# Patient Record
Sex: FEMALE | Race: WHITE | Employment: OTHER | URBAN - METROPOLITAN AREA
[De-identification: names, ages, dates, MRNs, and addresses within clinical notes are randomized per-mention and may not be internally consistent; named-entity substitution may affect disease eponyms.]

---

## 2017-01-11 ENCOUNTER — TELEPHONE (OUTPATIENT)
Dept: SOCIAL WORK | Age: 72
End: 2017-01-11

## 2017-01-23 RX ORDER — METOPROLOL SUCCINATE 25 MG/1
25 TABLET, EXTENDED RELEASE ORAL DAILY
Qty: 30 TABLET | Refills: 2 | Status: SHIPPED | OUTPATIENT
Start: 2017-01-23 | End: 2017-04-25 | Stop reason: SDUPTHER

## 2017-04-12 RX ORDER — AMLODIPINE BESYLATE 5 MG/1
5 TABLET ORAL DAILY
Qty: 30 TABLET | Refills: 0 | Status: SHIPPED | OUTPATIENT
Start: 2017-04-12 | End: 2017-05-02 | Stop reason: SDUPTHER

## 2017-04-20 RX ORDER — AMOXICILLIN AND CLAVULANATE POTASSIUM 875; 125 MG/1; MG/1
TABLET, FILM COATED ORAL
Qty: 6 TABLET | Refills: 0 | Status: SHIPPED | OUTPATIENT
Start: 2017-04-20 | End: 2017-05-02 | Stop reason: ALTCHOICE

## 2017-04-25 RX ORDER — METOPROLOL SUCCINATE 25 MG/1
25 TABLET, EXTENDED RELEASE ORAL DAILY
Qty: 30 TABLET | Refills: 0 | Status: SHIPPED | OUTPATIENT
Start: 2017-04-25 | End: 2017-05-02 | Stop reason: SDUPTHER

## 2017-05-02 ENCOUNTER — OFFICE VISIT (OUTPATIENT)
Dept: FAMILY MEDICINE CLINIC | Age: 72
End: 2017-05-02

## 2017-05-02 VITALS
HEART RATE: 56 BPM | SYSTOLIC BLOOD PRESSURE: 119 MMHG | BODY MASS INDEX: 35.14 KG/M2 | HEIGHT: 60 IN | WEIGHT: 179 LBS | OXYGEN SATURATION: 97 % | DIASTOLIC BLOOD PRESSURE: 66 MMHG

## 2017-05-02 DIAGNOSIS — R73.03 PREDIABETES: Primary | ICD-10-CM

## 2017-05-02 DIAGNOSIS — I10 ESSENTIAL HYPERTENSION: ICD-10-CM

## 2017-05-02 DIAGNOSIS — R73.01 ELEVATED FASTING GLUCOSE: ICD-10-CM

## 2017-05-02 DIAGNOSIS — F32.A DEPRESSION, UNSPECIFIED DEPRESSION TYPE: ICD-10-CM

## 2017-05-02 LAB
A/G RATIO: 1.8 (ref 1.1–2.2)
ALBUMIN SERPL-MCNC: 4.6 G/DL (ref 3.4–5)
ALP BLD-CCNC: 80 U/L (ref 40–129)
ALT SERPL-CCNC: 14 U/L (ref 10–40)
ANION GAP SERPL CALCULATED.3IONS-SCNC: 16 MMOL/L (ref 3–16)
AST SERPL-CCNC: 18 U/L (ref 15–37)
BILIRUB SERPL-MCNC: 0.8 MG/DL (ref 0–1)
BUN BLDV-MCNC: 19 MG/DL (ref 7–20)
CALCIUM SERPL-MCNC: 10.8 MG/DL (ref 8.3–10.6)
CHLORIDE BLD-SCNC: 99 MMOL/L (ref 99–110)
CHOLESTEROL, TOTAL: 237 MG/DL (ref 0–199)
CO2: 28 MMOL/L (ref 21–32)
CREAT SERPL-MCNC: 0.6 MG/DL (ref 0.6–1.2)
GFR AFRICAN AMERICAN: >60
GFR NON-AFRICAN AMERICAN: >60
GLOBULIN: 2.5 G/DL
GLUCOSE BLD-MCNC: 97 MG/DL (ref 70–99)
HDLC SERPL-MCNC: 69 MG/DL (ref 40–60)
LDL CHOLESTEROL CALCULATED: 147 MG/DL
POTASSIUM SERPL-SCNC: 4.3 MMOL/L (ref 3.5–5.1)
SODIUM BLD-SCNC: 143 MMOL/L (ref 136–145)
TOTAL PROTEIN: 7.1 G/DL (ref 6.4–8.2)
TRIGL SERPL-MCNC: 103 MG/DL (ref 0–150)
VLDLC SERPL CALC-MCNC: 21 MG/DL

## 2017-05-02 PROCEDURE — G8399 PT W/DXA RESULTS DOCUMENT: HCPCS | Performed by: FAMILY MEDICINE

## 2017-05-02 PROCEDURE — 4040F PNEUMOC VAC/ADMIN/RCVD: CPT | Performed by: FAMILY MEDICINE

## 2017-05-02 PROCEDURE — 1090F PRES/ABSN URINE INCON ASSESS: CPT | Performed by: FAMILY MEDICINE

## 2017-05-02 PROCEDURE — 3014F SCREEN MAMMO DOC REV: CPT | Performed by: FAMILY MEDICINE

## 2017-05-02 PROCEDURE — G8419 CALC BMI OUT NRM PARAM NOF/U: HCPCS | Performed by: FAMILY MEDICINE

## 2017-05-02 PROCEDURE — G8427 DOCREV CUR MEDS BY ELIG CLIN: HCPCS | Performed by: FAMILY MEDICINE

## 2017-05-02 PROCEDURE — 1123F ACP DISCUSS/DSCN MKR DOCD: CPT | Performed by: FAMILY MEDICINE

## 2017-05-02 PROCEDURE — 99214 OFFICE O/P EST MOD 30 MIN: CPT | Performed by: FAMILY MEDICINE

## 2017-05-02 PROCEDURE — 3017F COLORECTAL CA SCREEN DOC REV: CPT | Performed by: FAMILY MEDICINE

## 2017-05-02 PROCEDURE — 36415 COLL VENOUS BLD VENIPUNCTURE: CPT | Performed by: FAMILY MEDICINE

## 2017-05-02 PROCEDURE — 1036F TOBACCO NON-USER: CPT | Performed by: FAMILY MEDICINE

## 2017-05-02 RX ORDER — LOSARTAN POTASSIUM AND HYDROCHLOROTHIAZIDE 25; 100 MG/1; MG/1
TABLET ORAL
Qty: 30 TABLET | Refills: 11 | Status: SHIPPED | OUTPATIENT
Start: 2017-05-02 | End: 2018-05-02 | Stop reason: SDUPTHER

## 2017-05-02 RX ORDER — METOPROLOL SUCCINATE 25 MG/1
25 TABLET, EXTENDED RELEASE ORAL DAILY
Qty: 30 TABLET | Refills: 11 | Status: SHIPPED | OUTPATIENT
Start: 2017-05-02 | End: 2018-05-23 | Stop reason: SDUPTHER

## 2017-05-02 RX ORDER — METOPROLOL SUCCINATE 100 MG/1
100 TABLET, EXTENDED RELEASE ORAL DAILY
Qty: 30 TABLET | Refills: 11 | Status: SHIPPED | OUTPATIENT
Start: 2017-05-02 | End: 2018-05-02 | Stop reason: SDUPTHER

## 2017-05-02 RX ORDER — AMLODIPINE BESYLATE 5 MG/1
5 TABLET ORAL DAILY
Qty: 30 TABLET | Refills: 11 | Status: SHIPPED | OUTPATIENT
Start: 2017-05-02 | End: 2018-05-02 | Stop reason: SDUPTHER

## 2017-05-03 LAB
ESTIMATED AVERAGE GLUCOSE: 122.6 MG/DL
HBA1C MFR BLD: 5.9 %

## 2017-05-03 ASSESSMENT — ENCOUNTER SYMPTOMS
ABDOMINAL PAIN: 0
VOMITING: 0
STRIDOR: 0
ANAL BLEEDING: 0
SHORTNESS OF BREATH: 0
EYE PAIN: 0
WHEEZING: 0
BLOOD IN STOOL: 0
DIARRHEA: 0
CHEST TIGHTNESS: 0
CONSTIPATION: 0

## 2017-05-09 DIAGNOSIS — E83.52 SERUM CALCIUM ELEVATED: Primary | ICD-10-CM

## 2017-05-10 ENCOUNTER — HOSPITAL ENCOUNTER (OUTPATIENT)
Dept: OTHER | Age: 72
Discharge: HOME | End: 2017-05-10
Attending: FAMILY MEDICINE | Admitting: FAMILY MEDICINE

## 2017-05-11 ENCOUNTER — HOSPITAL ENCOUNTER (OUTPATIENT)
Dept: MAMMOGRAPHY | Age: 72
Discharge: HOME | End: 2017-05-11
Attending: OBSTETRICS & GYNECOLOGY | Admitting: OBSTETRICS & GYNECOLOGY

## 2017-05-11 DIAGNOSIS — R92.8 BI-RADS CATEGORY 3 MAMMOGRAM RESULT: ICD-10-CM

## 2017-05-12 LAB
CALCIUM IONIZED, CALC AT PH 7.4: 1.42 MMOL/L (ref 1.11–1.3)
CALCIUM IONIZED: 1.41 MMOL/L (ref 1.11–1.3)

## 2017-06-08 ENCOUNTER — TELEPHONE (OUTPATIENT)
Dept: FAMILY MEDICINE CLINIC | Age: 72
End: 2017-06-08

## 2017-06-08 DIAGNOSIS — E83.52 SERUM CALCIUM ELEVATED: Primary | ICD-10-CM

## 2017-06-13 ENCOUNTER — NURSE ONLY (OUTPATIENT)
Dept: FAMILY MEDICINE CLINIC | Age: 72
End: 2017-06-13

## 2017-06-13 DIAGNOSIS — E83.52 SERUM CALCIUM ELEVATED: ICD-10-CM

## 2017-06-13 DIAGNOSIS — E83.52 SERUM CALCIUM ELEVATED: Primary | ICD-10-CM

## 2017-06-13 LAB
ANION GAP SERPL CALCULATED.3IONS-SCNC: 14 MMOL/L (ref 3–16)
BUN BLDV-MCNC: 21 MG/DL (ref 7–20)
CALCIUM SERPL-MCNC: 9.8 MG/DL (ref 8.3–10.6)
CHLORIDE BLD-SCNC: 101 MMOL/L (ref 99–110)
CO2: 28 MMOL/L (ref 21–32)
CREAT SERPL-MCNC: 0.5 MG/DL (ref 0.6–1.2)
GFR AFRICAN AMERICAN: >60
GFR NON-AFRICAN AMERICAN: >60
GLUCOSE BLD-MCNC: 90 MG/DL (ref 70–99)
POTASSIUM SERPL-SCNC: 4.1 MMOL/L (ref 3.5–5.1)
SODIUM BLD-SCNC: 143 MMOL/L (ref 136–145)

## 2017-06-13 PROCEDURE — 36415 COLL VENOUS BLD VENIPUNCTURE: CPT | Performed by: FAMILY MEDICINE

## 2017-10-09 ENCOUNTER — HOSPITAL ENCOUNTER (OUTPATIENT)
Dept: OTHER | Age: 72
Discharge: HOME | End: 2017-10-09
Attending: OBSTETRICS & GYNECOLOGY | Admitting: OBSTETRICS & GYNECOLOGY

## 2017-10-09 LAB
ABO/RH: NORMAL
ALBUMIN SERPL-MCNC: 4.2 G/DL (ref 3.4–5)
ANION GAP SERPL CALCULATED.3IONS-SCNC: 16 MMOL/L (ref 3–16)
ANTIBODY SCREEN: NORMAL
BASOPHILS ABSOLUTE: 0.1 K/UL (ref 0–0.2)
BASOPHILS RELATIVE PERCENT: 0.8 %
BUN BLDV-MCNC: 18 MG/DL (ref 7–20)
CALCIUM SERPL-MCNC: 10.4 MG/DL (ref 8.3–10.6)
CHLORIDE BLD-SCNC: 97 MMOL/L (ref 99–110)
CO2: 27 MMOL/L (ref 21–32)
CREAT SERPL-MCNC: 0.6 MG/DL (ref 0.6–1.2)
EKG ATRIAL RATE: 54 BPM
EKG DIAGNOSIS: NORMAL
EKG P AXIS: 49 DEGREES
EKG P-R INTERVAL: 176 MS
EKG Q-T INTERVAL: 426 MS
EKG QRS DURATION: 76 MS
EKG QTC CALCULATION (BAZETT): 403 MS
EKG R AXIS: -4 DEGREES
EKG T AXIS: 7 DEGREES
EKG VENTRICULAR RATE: 54 BPM
EOSINOPHILS ABSOLUTE: 0.1 K/UL (ref 0–0.6)
EOSINOPHILS RELATIVE PERCENT: 1.6 %
GFR AFRICAN AMERICAN: >60
GFR NON-AFRICAN AMERICAN: >60
GLUCOSE BLD-MCNC: 83 MG/DL (ref 70–99)
HCT VFR BLD CALC: 40.7 % (ref 36–48)
HEMOGLOBIN: 13.6 G/DL (ref 12–16)
LYMPHOCYTES ABSOLUTE: 2.2 K/UL (ref 1–5.1)
LYMPHOCYTES RELATIVE PERCENT: 32.8 %
MCH RBC QN AUTO: 30.3 PG (ref 26–34)
MCHC RBC AUTO-ENTMCNC: 33.4 G/DL (ref 31–36)
MCV RBC AUTO: 90.8 FL (ref 80–100)
MONOCYTES ABSOLUTE: 0.5 K/UL (ref 0–1.3)
MONOCYTES RELATIVE PERCENT: 7.9 %
NEUTROPHILS ABSOLUTE: 3.8 K/UL (ref 1.7–7.7)
NEUTROPHILS RELATIVE PERCENT: 56.9 %
PDW BLD-RTO: 13.4 % (ref 12.4–15.4)
PHOSPHORUS: 3.6 MG/DL (ref 2.5–4.9)
PLATELET # BLD: 237 K/UL (ref 135–450)
PMV BLD AUTO: 8.2 FL (ref 5–10.5)
POTASSIUM SERPL-SCNC: 4.4 MMOL/L (ref 3.5–5.1)
RBC # BLD: 4.49 M/UL (ref 4–5.2)
SODIUM BLD-SCNC: 140 MMOL/L (ref 136–145)
WBC # BLD: 6.7 K/UL (ref 4–11)

## 2017-10-16 ENCOUNTER — HOSPITAL ENCOUNTER (OUTPATIENT)
Dept: OTHER | Age: 72
Discharge: HOME | End: 2017-10-16
Attending: OBSTETRICS & GYNECOLOGY | Admitting: OBSTETRICS & GYNECOLOGY

## 2017-10-16 ENCOUNTER — HOSPITAL ENCOUNTER (OUTPATIENT)
Dept: CT IMAGING | Age: 72
Discharge: HOME | End: 2017-10-16
Attending: OBSTETRICS & GYNECOLOGY | Admitting: OBSTETRICS & GYNECOLOGY

## 2017-10-16 DIAGNOSIS — R52 PAIN: ICD-10-CM

## 2017-10-16 DIAGNOSIS — C54.1 MALIGNANT NEOPLASM OF ENDOMETRIUM (HCC): ICD-10-CM

## 2017-10-16 LAB
CREAT SERPL-MCNC: 0.6 MG/DL (ref 0.6–1.2)
GFR AFRICAN AMERICAN: >60
GFR NON-AFRICAN AMERICAN: >60

## 2017-11-10 ENCOUNTER — TELEPHONE (OUTPATIENT)
Dept: FAMILY MEDICINE CLINIC | Age: 72
End: 2017-11-10

## 2017-11-10 NOTE — TELEPHONE ENCOUNTER
Received call from spouse. He states patient had surgery 11/8/17. She was informed she can resume  taking her medications on Friday. Her BP is 115/58. He is requesting advise on whether she should start taking her  blood pressure  medication.  Advise

## 2018-03-19 ENCOUNTER — OFFICE VISIT (OUTPATIENT)
Dept: ORTHOPEDIC SURGERY | Age: 73
End: 2018-03-19

## 2018-03-19 VITALS — BODY MASS INDEX: 35.15 KG/M2 | HEIGHT: 60 IN | WEIGHT: 179.01 LBS

## 2018-03-19 DIAGNOSIS — Z96.651 STATUS POST TOTAL RIGHT KNEE REPLACEMENT: Primary | ICD-10-CM

## 2018-03-19 DIAGNOSIS — M25.561 PAIN IN JOINT OF RIGHT KNEE: ICD-10-CM

## 2018-03-19 PROCEDURE — 1123F ACP DISCUSS/DSCN MKR DOCD: CPT | Performed by: ORTHOPAEDIC SURGERY

## 2018-03-19 PROCEDURE — 3014F SCREEN MAMMO DOC REV: CPT | Performed by: ORTHOPAEDIC SURGERY

## 2018-03-19 PROCEDURE — 3017F COLORECTAL CA SCREEN DOC REV: CPT | Performed by: ORTHOPAEDIC SURGERY

## 2018-03-19 PROCEDURE — G8417 CALC BMI ABV UP PARAM F/U: HCPCS | Performed by: ORTHOPAEDIC SURGERY

## 2018-03-19 PROCEDURE — G8484 FLU IMMUNIZE NO ADMIN: HCPCS | Performed by: ORTHOPAEDIC SURGERY

## 2018-03-19 PROCEDURE — 4040F PNEUMOC VAC/ADMIN/RCVD: CPT | Performed by: ORTHOPAEDIC SURGERY

## 2018-03-19 PROCEDURE — G8399 PT W/DXA RESULTS DOCUMENT: HCPCS | Performed by: ORTHOPAEDIC SURGERY

## 2018-03-19 PROCEDURE — G8427 DOCREV CUR MEDS BY ELIG CLIN: HCPCS | Performed by: ORTHOPAEDIC SURGERY

## 2018-03-19 PROCEDURE — 1090F PRES/ABSN URINE INCON ASSESS: CPT | Performed by: ORTHOPAEDIC SURGERY

## 2018-03-19 PROCEDURE — 1036F TOBACCO NON-USER: CPT | Performed by: ORTHOPAEDIC SURGERY

## 2018-03-19 PROCEDURE — 99213 OFFICE O/P EST LOW 20 MIN: CPT | Performed by: ORTHOPAEDIC SURGERY

## 2018-03-19 ASSESSMENT — KOOS JR
GOING UP OR DOWN STAIRS: 2
STRAIGHTENING KNEE FULLY: 0
RISING FROM SITTING: 1
BENDING TO THE FLOOR TO PICK UP OBJECT: 1
HOW SEVERE IS YOUR KNEE STIFFNESS AFTER FIRST WAKING IN MORNING: 2
STANDING UPRIGHT: 0
TWISING OR PIVOTING ON KNEE: 1

## 2018-04-03 ENCOUNTER — OFFICE VISIT (OUTPATIENT)
Dept: FAMILY MEDICINE CLINIC | Age: 73
End: 2018-04-03

## 2018-04-03 VITALS
OXYGEN SATURATION: 96 % | BODY MASS INDEX: 39.45 KG/M2 | SYSTOLIC BLOOD PRESSURE: 142 MMHG | WEIGHT: 202 LBS | HEART RATE: 69 BPM | DIASTOLIC BLOOD PRESSURE: 86 MMHG

## 2018-04-03 DIAGNOSIS — I10 ESSENTIAL HYPERTENSION: Primary | ICD-10-CM

## 2018-04-03 DIAGNOSIS — R73.03 PREDIABETES: ICD-10-CM

## 2018-04-03 DIAGNOSIS — E78.5 DYSLIPIDEMIA: ICD-10-CM

## 2018-04-03 PROCEDURE — 99214 OFFICE O/P EST MOD 30 MIN: CPT | Performed by: FAMILY MEDICINE

## 2018-04-03 PROCEDURE — 3014F SCREEN MAMMO DOC REV: CPT | Performed by: FAMILY MEDICINE

## 2018-04-03 PROCEDURE — 1036F TOBACCO NON-USER: CPT | Performed by: FAMILY MEDICINE

## 2018-04-03 PROCEDURE — 4040F PNEUMOC VAC/ADMIN/RCVD: CPT | Performed by: FAMILY MEDICINE

## 2018-04-03 PROCEDURE — 1090F PRES/ABSN URINE INCON ASSESS: CPT | Performed by: FAMILY MEDICINE

## 2018-04-03 PROCEDURE — 3288F FALL RISK ASSESSMENT DOCD: CPT | Performed by: FAMILY MEDICINE

## 2018-04-03 PROCEDURE — 1123F ACP DISCUSS/DSCN MKR DOCD: CPT | Performed by: FAMILY MEDICINE

## 2018-04-03 PROCEDURE — G8427 DOCREV CUR MEDS BY ELIG CLIN: HCPCS | Performed by: FAMILY MEDICINE

## 2018-04-03 PROCEDURE — G8510 SCR DEP NEG, NO PLAN REQD: HCPCS | Performed by: FAMILY MEDICINE

## 2018-04-03 PROCEDURE — G8417 CALC BMI ABV UP PARAM F/U: HCPCS | Performed by: FAMILY MEDICINE

## 2018-04-03 PROCEDURE — G8399 PT W/DXA RESULTS DOCUMENT: HCPCS | Performed by: FAMILY MEDICINE

## 2018-04-03 PROCEDURE — 3017F COLORECTAL CA SCREEN DOC REV: CPT | Performed by: FAMILY MEDICINE

## 2018-04-03 ASSESSMENT — PATIENT HEALTH QUESTIONNAIRE - PHQ9
1. LITTLE INTEREST OR PLEASURE IN DOING THINGS: 0
SUM OF ALL RESPONSES TO PHQ QUESTIONS 1-9: 0
SUM OF ALL RESPONSES TO PHQ9 QUESTIONS 1 & 2: 0
2. FEELING DOWN, DEPRESSED OR HOPELESS: 0

## 2018-04-03 ASSESSMENT — ENCOUNTER SYMPTOMS: SHORTNESS OF BREATH: 0

## 2018-04-04 ENCOUNTER — NURSE ONLY (OUTPATIENT)
Dept: FAMILY MEDICINE CLINIC | Age: 73
End: 2018-04-04

## 2018-04-04 DIAGNOSIS — I10 ESSENTIAL HYPERTENSION: ICD-10-CM

## 2018-04-04 DIAGNOSIS — E78.5 DYSLIPIDEMIA: ICD-10-CM

## 2018-04-04 DIAGNOSIS — R73.03 PREDIABETES: ICD-10-CM

## 2018-04-04 DIAGNOSIS — E78.5 DYSLIPIDEMIA: Primary | ICD-10-CM

## 2018-04-04 LAB
A/G RATIO: 1.7 (ref 1.1–2.2)
ALBUMIN SERPL-MCNC: 4.5 G/DL (ref 3.4–5)
ALP BLD-CCNC: 81 U/L (ref 40–129)
ALT SERPL-CCNC: 11 U/L (ref 10–40)
ANION GAP SERPL CALCULATED.3IONS-SCNC: 15 MMOL/L (ref 3–16)
AST SERPL-CCNC: 18 U/L (ref 15–37)
BILIRUB SERPL-MCNC: 0.6 MG/DL (ref 0–1)
BUN BLDV-MCNC: 23 MG/DL (ref 7–20)
CALCIUM SERPL-MCNC: 9.7 MG/DL (ref 8.3–10.6)
CHLORIDE BLD-SCNC: 96 MMOL/L (ref 99–110)
CHOLESTEROL, TOTAL: 224 MG/DL (ref 0–199)
CO2: 30 MMOL/L (ref 21–32)
CREAT SERPL-MCNC: 0.6 MG/DL (ref 0.6–1.2)
GFR AFRICAN AMERICAN: >60
GFR NON-AFRICAN AMERICAN: >60
GLOBULIN: 2.7 G/DL
GLUCOSE BLD-MCNC: 102 MG/DL (ref 70–99)
HDLC SERPL-MCNC: 77 MG/DL (ref 40–60)
LDL CHOLESTEROL CALCULATED: 134 MG/DL
POTASSIUM SERPL-SCNC: 4.1 MMOL/L (ref 3.5–5.1)
SODIUM BLD-SCNC: 141 MMOL/L (ref 136–145)
TOTAL PROTEIN: 7.2 G/DL (ref 6.4–8.2)
TRIGL SERPL-MCNC: 63 MG/DL (ref 0–150)
VLDLC SERPL CALC-MCNC: 13 MG/DL

## 2018-04-04 PROCEDURE — 36415 COLL VENOUS BLD VENIPUNCTURE: CPT | Performed by: FAMILY MEDICINE

## 2018-04-05 LAB
ESTIMATED AVERAGE GLUCOSE: 122.6 MG/DL
HBA1C MFR BLD: 5.9 %

## 2018-04-06 RX ORDER — ATORVASTATIN CALCIUM 20 MG/1
20 TABLET, FILM COATED ORAL DAILY
Qty: 30 TABLET | Refills: 1 | Status: SHIPPED | OUTPATIENT
Start: 2018-04-06 | End: 2018-05-02 | Stop reason: SDUPTHER

## 2018-05-02 RX ORDER — METOPROLOL SUCCINATE 100 MG/1
100 TABLET, EXTENDED RELEASE ORAL DAILY
Qty: 90 TABLET | Refills: 1 | Status: SHIPPED | OUTPATIENT
Start: 2018-05-02 | End: 2018-11-01 | Stop reason: SDUPTHER

## 2018-05-02 RX ORDER — LATANOPROST 50 UG/ML
1 SOLUTION/ DROPS OPHTHALMIC NIGHTLY
Qty: 3 BOTTLE | Refills: 0 | Status: SHIPPED | OUTPATIENT
Start: 2018-05-02 | End: 2021-11-12 | Stop reason: ALTCHOICE

## 2018-05-02 RX ORDER — SERTRALINE HYDROCHLORIDE 100 MG/1
150 TABLET, FILM COATED ORAL DAILY
Qty: 90 TABLET | Refills: 0 | Status: SHIPPED | OUTPATIENT
Start: 2018-05-02 | End: 2020-05-05 | Stop reason: SDUPTHER

## 2018-05-02 RX ORDER — LOSARTAN POTASSIUM AND HYDROCHLOROTHIAZIDE 25; 100 MG/1; MG/1
TABLET ORAL
Qty: 90 TABLET | Refills: 1 | Status: SHIPPED | OUTPATIENT
Start: 2018-05-02 | End: 2018-11-21 | Stop reason: SDUPTHER

## 2018-05-02 RX ORDER — AMLODIPINE BESYLATE 5 MG/1
5 TABLET ORAL DAILY
Qty: 90 TABLET | Refills: 1 | Status: SHIPPED | OUTPATIENT
Start: 2018-05-02 | End: 2018-11-21 | Stop reason: SDUPTHER

## 2018-05-02 RX ORDER — TIMOLOL MALEATE 5 MG/ML
1 SOLUTION/ DROPS OPHTHALMIC 2 TIMES DAILY
Qty: 3 BOTTLE | Refills: 0 | Status: SHIPPED | OUTPATIENT
Start: 2018-05-02 | End: 2021-03-11 | Stop reason: ALTCHOICE

## 2018-05-02 RX ORDER — ATORVASTATIN CALCIUM 20 MG/1
20 TABLET, FILM COATED ORAL DAILY
Qty: 90 TABLET | Refills: 1 | Status: SHIPPED | OUTPATIENT
Start: 2018-05-02 | End: 2018-06-13

## 2018-05-23 ENCOUNTER — HOSPITAL ENCOUNTER (OUTPATIENT)
Dept: MAMMOGRAPHY | Age: 73
Discharge: HOME | End: 2018-05-23
Admitting: OBSTETRICS & GYNECOLOGY

## 2018-05-23 ENCOUNTER — OFFICE VISIT (OUTPATIENT)
Dept: FAMILY MEDICINE CLINIC | Age: 73
End: 2018-05-23

## 2018-05-23 VITALS
TEMPERATURE: 97.6 F | WEIGHT: 230 LBS | BODY MASS INDEX: 44.92 KG/M2 | SYSTOLIC BLOOD PRESSURE: 140 MMHG | DIASTOLIC BLOOD PRESSURE: 82 MMHG

## 2018-05-23 DIAGNOSIS — R60.9 EDEMA, UNSPECIFIED TYPE: Primary | ICD-10-CM

## 2018-05-23 DIAGNOSIS — I10 ESSENTIAL HYPERTENSION: ICD-10-CM

## 2018-05-23 DIAGNOSIS — Z12.31 ENCOUNTER FOR SCREENING MAMMOGRAM FOR BREAST CANCER: ICD-10-CM

## 2018-05-23 PROCEDURE — G8428 CUR MEDS NOT DOCUMENT: HCPCS | Performed by: NURSE PRACTITIONER

## 2018-05-23 PROCEDURE — 1090F PRES/ABSN URINE INCON ASSESS: CPT | Performed by: NURSE PRACTITIONER

## 2018-05-23 PROCEDURE — 1036F TOBACCO NON-USER: CPT | Performed by: NURSE PRACTITIONER

## 2018-05-23 PROCEDURE — 3017F COLORECTAL CA SCREEN DOC REV: CPT | Performed by: NURSE PRACTITIONER

## 2018-05-23 PROCEDURE — 4040F PNEUMOC VAC/ADMIN/RCVD: CPT | Performed by: NURSE PRACTITIONER

## 2018-05-23 PROCEDURE — 1123F ACP DISCUSS/DSCN MKR DOCD: CPT | Performed by: NURSE PRACTITIONER

## 2018-05-23 PROCEDURE — 99214 OFFICE O/P EST MOD 30 MIN: CPT | Performed by: NURSE PRACTITIONER

## 2018-05-23 PROCEDURE — G8399 PT W/DXA RESULTS DOCUMENT: HCPCS | Performed by: NURSE PRACTITIONER

## 2018-05-23 PROCEDURE — G8417 CALC BMI ABV UP PARAM F/U: HCPCS | Performed by: NURSE PRACTITIONER

## 2018-05-23 RX ORDER — HYDROCHLOROTHIAZIDE 25 MG/1
25 TABLET ORAL DAILY
Qty: 30 TABLET | Refills: 0 | Status: SHIPPED | OUTPATIENT
Start: 2018-05-23 | End: 2018-08-15

## 2018-05-23 RX ORDER — METOPROLOL SUCCINATE 25 MG/1
25 TABLET, EXTENDED RELEASE ORAL DAILY
Qty: 30 TABLET | Refills: 1 | Status: SHIPPED | OUTPATIENT
Start: 2018-05-23 | End: 2018-08-02 | Stop reason: SDUPTHER

## 2018-07-12 ENCOUNTER — OFFICE VISIT (OUTPATIENT)
Dept: ORTHOPEDIC SURGERY | Age: 73
End: 2018-07-12

## 2018-07-12 ENCOUNTER — HOSPITAL ENCOUNTER (OUTPATIENT)
Dept: OTHER | Age: 73
Discharge: HOME | End: 2018-07-12
Attending: ORTHOPAEDIC SURGERY | Admitting: ORTHOPAEDIC SURGERY

## 2018-07-12 VITALS — HEIGHT: 60 IN | BODY MASS INDEX: 35.34 KG/M2 | WEIGHT: 180 LBS

## 2018-07-12 DIAGNOSIS — M25.561 ACUTE PAIN OF BOTH KNEES: ICD-10-CM

## 2018-07-12 DIAGNOSIS — M25.561 ACUTE PAIN OF BOTH KNEES: Primary | ICD-10-CM

## 2018-07-12 DIAGNOSIS — Z96.653 HISTORY OF TOTAL KNEE ARTHROPLASTY, BILATERAL: ICD-10-CM

## 2018-07-12 DIAGNOSIS — M25.562 ACUTE PAIN OF BOTH KNEES: ICD-10-CM

## 2018-07-12 DIAGNOSIS — M25.562 ACUTE PAIN OF BOTH KNEES: Primary | ICD-10-CM

## 2018-07-12 LAB
BASOPHILS ABSOLUTE: 0.1 K/UL (ref 0–0.2)
BASOPHILS RELATIVE PERCENT: 1 %
C-REACTIVE PROTEIN: 2.8 MG/L (ref 0–5.1)
EOSINOPHILS ABSOLUTE: 0.1 K/UL (ref 0–0.6)
EOSINOPHILS RELATIVE PERCENT: 1.4 %
HCT VFR BLD CALC: 38 % (ref 36–48)
HEMOGLOBIN: 12.7 G/DL (ref 12–16)
LYMPHOCYTES ABSOLUTE: 1.5 K/UL (ref 1–5.1)
LYMPHOCYTES RELATIVE PERCENT: 26.3 %
MCH RBC QN AUTO: 30.1 PG (ref 26–34)
MCHC RBC AUTO-ENTMCNC: 33.5 G/DL (ref 31–36)
MCV RBC AUTO: 89.6 FL (ref 80–100)
MONOCYTES ABSOLUTE: 0.4 K/UL (ref 0–1.3)
MONOCYTES RELATIVE PERCENT: 7 %
NEUTROPHILS ABSOLUTE: 3.7 K/UL (ref 1.7–7.7)
NEUTROPHILS RELATIVE PERCENT: 64.3 %
PDW BLD-RTO: 12.5 % (ref 12.4–15.4)
PLATELET # BLD: 309 K/UL (ref 135–450)
PMV BLD AUTO: 7.7 FL (ref 5–10.5)
RBC # BLD: 4.24 M/UL (ref 4–5.2)
SEDIMENTATION RATE, ERYTHROCYTE: 23 MM/HR (ref 0–30)
WBC # BLD: 5.8 K/UL (ref 4–11)

## 2018-07-12 PROCEDURE — 1101F PT FALLS ASSESS-DOCD LE1/YR: CPT | Performed by: ORTHOPAEDIC SURGERY

## 2018-07-12 PROCEDURE — G8427 DOCREV CUR MEDS BY ELIG CLIN: HCPCS | Performed by: ORTHOPAEDIC SURGERY

## 2018-07-12 PROCEDURE — 4040F PNEUMOC VAC/ADMIN/RCVD: CPT | Performed by: ORTHOPAEDIC SURGERY

## 2018-07-12 PROCEDURE — 3017F COLORECTAL CA SCREEN DOC REV: CPT | Performed by: ORTHOPAEDIC SURGERY

## 2018-07-12 PROCEDURE — G8417 CALC BMI ABV UP PARAM F/U: HCPCS | Performed by: ORTHOPAEDIC SURGERY

## 2018-07-12 PROCEDURE — 1090F PRES/ABSN URINE INCON ASSESS: CPT | Performed by: ORTHOPAEDIC SURGERY

## 2018-07-12 PROCEDURE — G8399 PT W/DXA RESULTS DOCUMENT: HCPCS | Performed by: ORTHOPAEDIC SURGERY

## 2018-07-12 PROCEDURE — 99213 OFFICE O/P EST LOW 20 MIN: CPT | Performed by: ORTHOPAEDIC SURGERY

## 2018-07-12 PROCEDURE — 1036F TOBACCO NON-USER: CPT | Performed by: ORTHOPAEDIC SURGERY

## 2018-07-12 PROCEDURE — 1123F ACP DISCUSS/DSCN MKR DOCD: CPT | Performed by: ORTHOPAEDIC SURGERY

## 2018-07-12 NOTE — PROGRESS NOTES
Chief Complaint    Knee Pain (RIGHT TKR 9/14/16; FERNANDO ankle/leg swelling increased after trip to [facility], saw PCP (calvin FRANKLIN), given blood thinner and water pill; has been modifying activity--helps swelling)      History of Present Illness:  Da Reich is a 75 y.o. female she is here for evaluation of both of her knees. She had a left knee replacement done 7 years ago. She had a right knee replacement done one half years ago. She had been doing well up until April 21 of 2018. She took a plane flight to [place] and ever since then she has had swelling of both of her legs right worse than left. She was evaluated with Doppler ultrasound. She was negative for DVT. She has had continued swelling of both of her legs right worse than left. She believes it is worsened when she is on her feet for long periods. She is here today for evaluation of this. She denies any fevers or chills. She denies any sick contacts. She does believe she may have walked through a lawn back in April that had pesticides. She is unsure if this is related. Pain Assessment  Location of Pain: Ankle  Location Modifiers: Right, Left  Severity of Pain: 2  Quality of Pain: Throbbing, Dull  Duration of Pain: Persistent  Frequency of Pain: Intermittent  Aggravating Factors: Standing, Walking  Limiting Behavior: Some  Relieving Factors: Rest    Medical History:  Patient's medications, allergies, past medical, surgical, social and family histories were reviewed and updated as appropriate.     Review of Systems:  Pertinent items are noted in HPI  Review of systems reviewed from Patient History Form       Vital Signs:  Ht 5' (1.524 m)   Wt 180 lb (81.6 kg)   LMP  (LMP Unknown)   BMI 35.15 kg/m²     General Exam:   Constitutional: Patient is adequately groomed with no evidence of malnutrition    Left Knee Examination:    Inspection:  Incision well-healed no erythema and no drainage, moderate swelling right knee, no effusion    Palpation:  No will proceed with intra-articular joint aspiration. This is explained in detail to the patient. All questions were answered to her satisfaction. She understands and agrees to plan.

## 2018-07-16 ENCOUNTER — OFFICE VISIT (OUTPATIENT)
Dept: ORTHOPEDIC SURGERY | Age: 73
End: 2018-07-16

## 2018-07-16 DIAGNOSIS — Z96.653 HISTORY OF TOTAL KNEE ARTHROPLASTY, BILATERAL: Primary | ICD-10-CM

## 2018-07-16 PROCEDURE — 1036F TOBACCO NON-USER: CPT | Performed by: ORTHOPAEDIC SURGERY

## 2018-07-16 PROCEDURE — G8417 CALC BMI ABV UP PARAM F/U: HCPCS | Performed by: ORTHOPAEDIC SURGERY

## 2018-07-16 PROCEDURE — G8427 DOCREV CUR MEDS BY ELIG CLIN: HCPCS | Performed by: ORTHOPAEDIC SURGERY

## 2018-07-16 PROCEDURE — G8399 PT W/DXA RESULTS DOCUMENT: HCPCS | Performed by: ORTHOPAEDIC SURGERY

## 2018-07-16 PROCEDURE — 99213 OFFICE O/P EST LOW 20 MIN: CPT | Performed by: ORTHOPAEDIC SURGERY

## 2018-07-16 PROCEDURE — 1090F PRES/ABSN URINE INCON ASSESS: CPT | Performed by: ORTHOPAEDIC SURGERY

## 2018-07-16 PROCEDURE — 3017F COLORECTAL CA SCREEN DOC REV: CPT | Performed by: ORTHOPAEDIC SURGERY

## 2018-07-16 PROCEDURE — 4040F PNEUMOC VAC/ADMIN/RCVD: CPT | Performed by: ORTHOPAEDIC SURGERY

## 2018-07-16 PROCEDURE — 1123F ACP DISCUSS/DSCN MKR DOCD: CPT | Performed by: ORTHOPAEDIC SURGERY

## 2018-07-16 PROCEDURE — 1101F PT FALLS ASSESS-DOCD LE1/YR: CPT | Performed by: ORTHOPAEDIC SURGERY

## 2018-07-20 DIAGNOSIS — I10 ESSENTIAL HYPERTENSION: ICD-10-CM

## 2018-07-20 NOTE — TELEPHONE ENCOUNTER
Patient called and is requesting a refill on her prescription of Metoprolol 25 XL. Patient states that she is leaving to go out of town on Sunday and needs to  the prescription from [address] [place] [address] at the [facility] before she leaves.

## 2018-07-23 RX ORDER — METOPROLOL SUCCINATE 25 MG/1
25 TABLET, EXTENDED RELEASE ORAL DAILY
Qty: 30 TABLET | Refills: 2 | OUTPATIENT
Start: 2018-07-23

## 2018-08-02 DIAGNOSIS — I10 ESSENTIAL HYPERTENSION: ICD-10-CM

## 2018-08-02 RX ORDER — METOPROLOL SUCCINATE 25 MG/1
25 TABLET, EXTENDED RELEASE ORAL DAILY
Qty: 30 TABLET | Refills: 2 | Status: SHIPPED | OUTPATIENT
Start: 2018-08-02 | End: 2018-11-10 | Stop reason: SDUPTHER

## 2018-08-20 ENCOUNTER — HOSPITAL ENCOUNTER (OUTPATIENT)
Dept: PREADMISSION TESTING | Age: 73
Discharge: HOME | End: 2018-08-24
Payer: MEDICARE

## 2018-08-20 LAB
ALBUMIN SERPL-MCNC: 4.3 G/DL (ref 3.4–5)
ANION GAP SERPL CALCULATED.3IONS-SCNC: 10 MMOL/L (ref 3–16)
BASOPHILS ABSOLUTE: 0 K/UL (ref 0–0.2)
BASOPHILS RELATIVE PERCENT: 1 %
BUN BLDV-MCNC: 22 MG/DL (ref 7–20)
CALCIUM SERPL-MCNC: 10.6 MG/DL (ref 8.3–10.6)
CHLORIDE BLD-SCNC: 100 MMOL/L (ref 99–110)
CO2: 31 MMOL/L (ref 21–32)
CREAT SERPL-MCNC: 0.6 MG/DL (ref 0.6–1.2)
EOSINOPHILS ABSOLUTE: 0.1 K/UL (ref 0–0.6)
EOSINOPHILS RELATIVE PERCENT: 2.5 %
GFR AFRICAN AMERICAN: >60
GFR NON-AFRICAN AMERICAN: >60
GLUCOSE BLD-MCNC: 106 MG/DL (ref 70–99)
HCT VFR BLD CALC: 40.1 % (ref 36–48)
HEMOGLOBIN: 13.3 G/DL (ref 12–16)
LYMPHOCYTES ABSOLUTE: 1.4 K/UL (ref 1–5.1)
LYMPHOCYTES RELATIVE PERCENT: 28 %
MCH RBC QN AUTO: 29.9 PG (ref 26–34)
MCHC RBC AUTO-ENTMCNC: 33.1 G/DL (ref 31–36)
MCV RBC AUTO: 90.3 FL (ref 80–100)
MONOCYTES ABSOLUTE: 0.4 K/UL (ref 0–1.3)
MONOCYTES RELATIVE PERCENT: 7.9 %
NEUTROPHILS ABSOLUTE: 2.9 K/UL (ref 1.7–7.7)
NEUTROPHILS RELATIVE PERCENT: 60.6 %
PDW BLD-RTO: 13.7 % (ref 12.4–15.4)
PHOSPHORUS: 3.7 MG/DL (ref 2.5–4.9)
PLATELET # BLD: 239 K/UL (ref 135–450)
PMV BLD AUTO: 8.2 FL (ref 5–10.5)
POTASSIUM SERPL-SCNC: 4.1 MMOL/L (ref 3.5–5.1)
RBC # BLD: 4.44 M/UL (ref 4–5.2)
SODIUM BLD-SCNC: 141 MMOL/L (ref 136–145)
WBC # BLD: 4.8 K/UL (ref 4–11)

## 2018-08-20 PROCEDURE — 36415 COLL VENOUS BLD VENIPUNCTURE: CPT

## 2018-08-20 PROCEDURE — 80069 RENAL FUNCTION PANEL: CPT

## 2018-08-20 PROCEDURE — 85025 COMPLETE CBC W/AUTO DIFF WBC: CPT

## 2018-08-21 LAB
EKG ATRIAL RATE: 58 BPM
EKG DIAGNOSIS: NORMAL
EKG P AXIS: -14 DEGREES
EKG P-R INTERVAL: 164 MS
EKG Q-T INTERVAL: 426 MS
EKG QRS DURATION: 80 MS
EKG QTC CALCULATION (BAZETT): 418 MS
EKG R AXIS: -6 DEGREES
EKG T AXIS: -7 DEGREES
EKG VENTRICULAR RATE: 58 BPM

## 2018-08-23 ENCOUNTER — ANESTHESIA EVENT (OUTPATIENT)
Dept: OPERATING ROOM | Age: 73
End: 2018-08-23
Payer: MEDICARE

## 2018-08-24 ENCOUNTER — HOSPITAL ENCOUNTER (OUTPATIENT)
Age: 73
Setting detail: OUTPATIENT SURGERY
Discharge: HOME | End: 2018-08-24
Attending: OBSTETRICS & GYNECOLOGY | Admitting: OBSTETRICS & GYNECOLOGY
Payer: MEDICARE

## 2018-08-24 ENCOUNTER — ANESTHESIA (OUTPATIENT)
Dept: OPERATING ROOM | Age: 73
End: 2018-08-24
Payer: MEDICARE

## 2018-08-24 VITALS
OXYGEN SATURATION: 93 % | DIASTOLIC BLOOD PRESSURE: 70 MMHG | HEIGHT: 60 IN | RESPIRATION RATE: 14 BRPM | HEART RATE: 59 BPM | SYSTOLIC BLOOD PRESSURE: 150 MMHG | TEMPERATURE: 98.1 F | WEIGHT: 207 LBS | BODY MASS INDEX: 40.64 KG/M2

## 2018-08-24 PROBLEM — N60.81 SEBACEOUS CYST OF SKIN OF RIGHT BREAST: Status: ACTIVE | Noted: 2018-08-24

## 2018-08-24 LAB
GLUCOSE BLD-MCNC: 112 MG/DL (ref 70–99)
PERFORMED ON: ABNORMAL

## 2018-08-24 PROCEDURE — 2500000003 HC RX 250 WO HCPCS: Performed by: ANESTHESIOLOGY

## 2018-08-24 PROCEDURE — 7100000000 HC PACU RECOVERY - FIRST 15 MIN: Performed by: OBSTETRICS & GYNECOLOGY

## 2018-08-24 PROCEDURE — 3700000001 HC ADD 15 MINUTES (ANESTHESIA): Performed by: OBSTETRICS & GYNECOLOGY

## 2018-08-24 PROCEDURE — 2709999900 HC NON-CHARGEABLE SUPPLY: Performed by: OBSTETRICS & GYNECOLOGY

## 2018-08-24 PROCEDURE — 3600000003 HC SURGERY LEVEL 3 BASE: Performed by: OBSTETRICS & GYNECOLOGY

## 2018-08-24 PROCEDURE — 3700000000 HC ANESTHESIA ATTENDED CARE: Performed by: OBSTETRICS & GYNECOLOGY

## 2018-08-24 PROCEDURE — 7100000011 HC PHASE II RECOVERY - ADDTL 15 MIN: Performed by: OBSTETRICS & GYNECOLOGY

## 2018-08-24 PROCEDURE — 3600000013 HC SURGERY LEVEL 3 ADDTL 15MIN: Performed by: OBSTETRICS & GYNECOLOGY

## 2018-08-24 PROCEDURE — 2580000003 HC RX 258: Performed by: NURSE ANESTHETIST, CERTIFIED REGISTERED

## 2018-08-24 PROCEDURE — 2580000003 HC RX 258: Performed by: ANESTHESIOLOGY

## 2018-08-24 PROCEDURE — 2500000003 HC RX 250 WO HCPCS: Performed by: OBSTETRICS & GYNECOLOGY

## 2018-08-24 PROCEDURE — 7100000010 HC PHASE II RECOVERY - FIRST 15 MIN: Performed by: OBSTETRICS & GYNECOLOGY

## 2018-08-24 PROCEDURE — 7100000001 HC PACU RECOVERY - ADDTL 15 MIN: Performed by: OBSTETRICS & GYNECOLOGY

## 2018-08-24 RX ORDER — MEPERIDINE HYDROCHLORIDE 50 MG/ML
12.5 INJECTION INTRAMUSCULAR; INTRAVENOUS; SUBCUTANEOUS EVERY 5 MIN PRN
Status: DISCONTINUED | OUTPATIENT
Start: 2018-08-24 | End: 2018-08-24 | Stop reason: HOSPADM

## 2018-08-24 RX ORDER — SODIUM CHLORIDE, SODIUM LACTATE, POTASSIUM CHLORIDE, CALCIUM CHLORIDE 600; 310; 30; 20 MG/100ML; MG/100ML; MG/100ML; MG/100ML
INJECTION, SOLUTION INTRAVENOUS CONTINUOUS PRN
Status: DISCONTINUED | OUTPATIENT
Start: 2018-08-24 | End: 2018-08-24 | Stop reason: SDUPTHER

## 2018-08-24 RX ORDER — MORPHINE SULFATE 4 MG/ML
2 INJECTION, SOLUTION INTRAMUSCULAR; INTRAVENOUS EVERY 5 MIN PRN
Status: DISCONTINUED | OUTPATIENT
Start: 2018-08-24 | End: 2018-08-24 | Stop reason: HOSPADM

## 2018-08-24 RX ORDER — MORPHINE SULFATE 4 MG/ML
1 INJECTION, SOLUTION INTRAMUSCULAR; INTRAVENOUS EVERY 5 MIN PRN
Status: DISCONTINUED | OUTPATIENT
Start: 2018-08-24 | End: 2018-08-24 | Stop reason: HOSPADM

## 2018-08-24 RX ORDER — ONDANSETRON 2 MG/ML
4 INJECTION INTRAMUSCULAR; INTRAVENOUS
Status: DISCONTINUED | OUTPATIENT
Start: 2018-08-24 | End: 2018-08-24 | Stop reason: HOSPADM

## 2018-08-24 RX ORDER — BUPIVACAINE HYDROCHLORIDE AND EPINEPHRINE 5; 5 MG/ML; UG/ML
INJECTION, SOLUTION PERINEURAL PRN
Status: DISCONTINUED | OUTPATIENT
Start: 2018-08-24 | End: 2018-08-24 | Stop reason: HOSPADM

## 2018-08-24 RX ORDER — SODIUM CHLORIDE, SODIUM LACTATE, POTASSIUM CHLORIDE, CALCIUM CHLORIDE 600; 310; 30; 20 MG/100ML; MG/100ML; MG/100ML; MG/100ML
INJECTION, SOLUTION INTRAVENOUS CONTINUOUS
Status: DISCONTINUED | OUTPATIENT
Start: 2018-08-24 | End: 2018-08-24 | Stop reason: HOSPADM

## 2018-08-24 RX ORDER — OXYCODONE HYDROCHLORIDE AND ACETAMINOPHEN 5; 325 MG/1; MG/1
1 TABLET ORAL PRN
Status: DISCONTINUED | OUTPATIENT
Start: 2018-08-24 | End: 2018-08-24 | Stop reason: HOSPADM

## 2018-08-24 RX ORDER — DIPHENHYDRAMINE HYDROCHLORIDE 50 MG/ML
12.5 INJECTION INTRAMUSCULAR; INTRAVENOUS
Status: DISCONTINUED | OUTPATIENT
Start: 2018-08-24 | End: 2018-08-24 | Stop reason: HOSPADM

## 2018-08-24 RX ORDER — LABETALOL HYDROCHLORIDE 5 MG/ML
5 INJECTION, SOLUTION INTRAVENOUS EVERY 10 MIN PRN
Status: DISCONTINUED | OUTPATIENT
Start: 2018-08-24 | End: 2018-08-24 | Stop reason: HOSPADM

## 2018-08-24 RX ORDER — LIDOCAINE HYDROCHLORIDE 10 MG/ML
2 INJECTION, SOLUTION INFILTRATION; PERINEURAL
Status: COMPLETED | OUTPATIENT
Start: 2018-08-24 | End: 2018-08-24

## 2018-08-24 RX ORDER — OXYCODONE HYDROCHLORIDE AND ACETAMINOPHEN 5; 325 MG/1; MG/1
2 TABLET ORAL PRN
Status: DISCONTINUED | OUTPATIENT
Start: 2018-08-24 | End: 2018-08-24 | Stop reason: HOSPADM

## 2018-08-24 RX ORDER — HYDRALAZINE HYDROCHLORIDE 20 MG/ML
5 INJECTION INTRAMUSCULAR; INTRAVENOUS
Status: DISCONTINUED | OUTPATIENT
Start: 2018-08-24 | End: 2018-08-24 | Stop reason: HOSPADM

## 2018-08-24 RX ADMIN — SODIUM CHLORIDE, POTASSIUM CHLORIDE, SODIUM LACTATE AND CALCIUM CHLORIDE: 600; 310; 30; 20 INJECTION, SOLUTION INTRAVENOUS at 06:54

## 2018-08-24 RX ADMIN — LIDOCAINE HYDROCHLORIDE 2 ML: 10 INJECTION, SOLUTION EPIDURAL; INFILTRATION; INTRACAUDAL; PERINEURAL at 06:54

## 2018-08-24 RX ADMIN — SODIUM CHLORIDE, POTASSIUM CHLORIDE, SODIUM LACTATE AND CALCIUM CHLORIDE: 600; 310; 30; 20 INJECTION, SOLUTION INTRAVENOUS at 07:42

## 2018-08-24 ASSESSMENT — PAIN - FUNCTIONAL ASSESSMENT: PAIN_FUNCTIONAL_ASSESSMENT: 0-10

## 2018-08-24 ASSESSMENT — PAIN SCALES - GENERAL
PAINLEVEL_OUTOF10: 0

## 2018-08-24 NOTE — PROGRESS NOTES
Discharge instructions given verbally and written to patient and  at bedside. Questions answered. Written paperwork given to .

## 2018-08-24 NOTE — BRIEF OP NOTE
Brief Postoperative Note  ______________________________________________________________    Patient: Issac Denver  YOB: 1945  MRN: [id number]  Date of Procedure: 8/24/2018    Pre-Op Diagnosis: SEBACEOUS CYST OF CHEST WALL    Post-Op Diagnosis: Same       Procedure(s):  REMOVAL OF SEBACEOUS CYST - CHEST WALL    Anesthesia: General    Surgeon(s):  No Ortega MD    Staff:  Surgical Assistant: Roxann Dodge  Scrub Person First: Cierra Pete     Estimated Blood Loss: minimal    Complications: None    Specimens:   * No specimens in log *    Implants:  * No implants in log *      Drains:      Findings: 3cm sebaceous cyst rt sternum    Bhargav Saenz MD  Date: 8/24/2018  Time: 7:58 AM

## 2018-08-24 NOTE — ANESTHESIA POSTPROCEDURE EVALUATION
Department of Anesthesiology  Postprocedure Note    Patient: James Greene  MRN: [id number]  YOB: 1945  Date of evaluation: 8/24/2018  Time:  8:58 AM     Procedure Summary     Date:  08/24/18 Room / Location:  [facility]  / Gia Caldwell    Anesthesia Start:  3298 Anesthesia Stop:  1990    Procedure:  REMOVAL OF SEBACEOUS CYST - CHEST WALL (N/A Chest) Diagnosis:       Sebaceous cyst      (SEBACEOUS CYST OF CHEST WALL)    Surgeon:  Lili Perez MD Responsible Provider:  Rocio Chapin MD    Anesthesia Type:  general ASA Status:  2          Anesthesia Type: general    Isauro Phase I: Isauro Score: 10    Isauro Phase II: Isauro Score: 10    Last vitals: Reviewed and per EMR flowsheets.        Anesthesia Post Evaluation    Patient location during evaluation: bedside  Patient participation: complete - patient participated  Level of consciousness: awake  Airway patency: patent  Complications: no  Cardiovascular status: blood pressure returned to baseline  Respiratory status: acceptable  Comments: Postoperative Anesthesia Note    Name:    James Greene  MRN:      [id number]    Patient Vitals in the past 12 hrs:  08/24/18 0843, BP:(!) 150/70, Temp:98.1 °F (36.7 °C), Temp src:Temporal, Pulse:59, Resp:14, SpO2:93 %  08/24/18 0830, BP:137/65, Temp:97 °F (36.1 °C), Temp src:Temporal, Pulse:61, Resp:12, SpO2:93 %  08/24/18 0820, BP:(!) 131/59, Pulse:63, Resp:12, SpO2:95 %  08/24/18 0811, BP:(!) 146/63, Temp:97 °F (36.1 °C), Temp src:Temporal, Pulse:66, Resp:12, SpO2:98 %  08/24/18 0635, BP:(!) 156/67, Temp:98.5 °F (36.9 °C), Temp src:Temporal, Pulse:67, Resp:14, SpO2:96 %, Height:5' (1.524 m), Weight:207 lb (93.9 kg)     LABS:    CBC  Lab Results       Component                Value               Date/Time                  WBC                      4.8                 08/20/2018 03:35 PM        HGB                      13.3                08/20/2018 03:35 PM        HCT                      40.1 08/20/2018 03:35 PM        PLT                      239                 08/20/2018 03:35 PM   RENAL  Lab Results       Component                Value               Date/Time                  NA                       141                 08/20/2018 03:35 PM        K                        4.1                 08/20/2018 03:35 PM        CL                       100                 08/20/2018 03:35 PM        CO2                      31                  08/20/2018 03:35 PM        BUN                      22 (H)              08/20/2018 03:35 PM        CREATININE               0.6                 08/20/2018 03:35 PM        GLUCOSE                  106 (H)             08/20/2018 03:35 PM   COAGS  Lab Results       Component                Value               Date/Time                  PROTIME                  10.8                09/14/2016 06:05 AM        INR                      0.95                09/14/2016 06:05 AM        APTT                     34.7                09/14/2016 06:05 AM     Intake & Output: In: 800 (I.V.:800)  Out: -     Nausea & Vomiting:  No    Level of Consciousness:  Awake    Pain Assessment:  Adequate analgesia    Anesthesia Complications:  No apparent anesthetic complications    SUMMARY      Vital signs stable  OK to discharge from Stage I post anesthesia care.   Care transferred from Anesthesiology department on discharge from perioperative area

## 2018-08-24 NOTE — OP NOTE
Vicryl running subcuticular stitches  and Dermabond glue and a bandage dressing. Having tolerated the procedure  well, she was taken to the recovery, awakened in an excellent condition.     Preethi Francisco MD    D: 08/24/2018 8:21:31       T: 08/24/2018 10:57:25     [email or web address]  Job#: [id number]     Doc#: [id number]    CC:

## 2018-10-11 ENCOUNTER — OFFICE VISIT (OUTPATIENT)
Dept: FAMILY MEDICINE CLINIC | Age: 73
End: 2018-10-11
Payer: MEDICARE

## 2018-10-11 VITALS
WEIGHT: 212 LBS | BODY MASS INDEX: 41.4 KG/M2 | DIASTOLIC BLOOD PRESSURE: 78 MMHG | HEART RATE: 62 BPM | OXYGEN SATURATION: 97 % | SYSTOLIC BLOOD PRESSURE: 126 MMHG

## 2018-10-11 DIAGNOSIS — E78.49 OTHER HYPERLIPIDEMIA: ICD-10-CM

## 2018-10-11 DIAGNOSIS — R73.03 PREDIABETES: ICD-10-CM

## 2018-10-11 DIAGNOSIS — I10 ESSENTIAL HYPERTENSION: Primary | ICD-10-CM

## 2018-10-11 PROCEDURE — 1090F PRES/ABSN URINE INCON ASSESS: CPT | Performed by: FAMILY MEDICINE

## 2018-10-11 PROCEDURE — G8399 PT W/DXA RESULTS DOCUMENT: HCPCS | Performed by: FAMILY MEDICINE

## 2018-10-11 PROCEDURE — 1123F ACP DISCUSS/DSCN MKR DOCD: CPT | Performed by: FAMILY MEDICINE

## 2018-10-11 PROCEDURE — G8484 FLU IMMUNIZE NO ADMIN: HCPCS | Performed by: FAMILY MEDICINE

## 2018-10-11 PROCEDURE — 1101F PT FALLS ASSESS-DOCD LE1/YR: CPT | Performed by: FAMILY MEDICINE

## 2018-10-11 PROCEDURE — G8417 CALC BMI ABV UP PARAM F/U: HCPCS | Performed by: FAMILY MEDICINE

## 2018-10-11 PROCEDURE — 4040F PNEUMOC VAC/ADMIN/RCVD: CPT | Performed by: FAMILY MEDICINE

## 2018-10-11 PROCEDURE — 1036F TOBACCO NON-USER: CPT | Performed by: FAMILY MEDICINE

## 2018-10-11 PROCEDURE — G8427 DOCREV CUR MEDS BY ELIG CLIN: HCPCS | Performed by: FAMILY MEDICINE

## 2018-10-11 PROCEDURE — 3017F COLORECTAL CA SCREEN DOC REV: CPT | Performed by: FAMILY MEDICINE

## 2018-10-11 PROCEDURE — 99214 OFFICE O/P EST MOD 30 MIN: CPT | Performed by: FAMILY MEDICINE

## 2018-10-11 ASSESSMENT — ENCOUNTER SYMPTOMS: SHORTNESS OF BREATH: 0

## 2018-11-01 RX ORDER — METOPROLOL SUCCINATE 100 MG/1
TABLET, EXTENDED RELEASE ORAL
Qty: 30 TABLET | Refills: 2 | Status: SHIPPED | OUTPATIENT
Start: 2018-11-01 | End: 2019-04-30

## 2018-11-10 DIAGNOSIS — I10 ESSENTIAL HYPERTENSION: ICD-10-CM

## 2018-11-12 RX ORDER — METOPROLOL SUCCINATE 25 MG/1
TABLET, EXTENDED RELEASE ORAL
Qty: 30 TABLET | Refills: 1 | Status: SHIPPED | OUTPATIENT
Start: 2018-11-12 | End: 2019-01-11 | Stop reason: SDUPTHER

## 2018-11-21 RX ORDER — LOSARTAN POTASSIUM AND HYDROCHLOROTHIAZIDE 25; 100 MG/1; MG/1
TABLET ORAL
Qty: 30 TABLET | Refills: 2 | Status: SHIPPED | OUTPATIENT
Start: 2018-11-21 | End: 2019-02-25 | Stop reason: SDUPTHER

## 2018-11-21 RX ORDER — AMLODIPINE BESYLATE 5 MG/1
TABLET ORAL
Qty: 30 TABLET | Refills: 2 | Status: SHIPPED | OUTPATIENT
Start: 2018-11-21 | End: 2019-02-25 | Stop reason: SDUPTHER

## 2019-01-11 DIAGNOSIS — I10 ESSENTIAL HYPERTENSION: ICD-10-CM

## 2019-01-11 RX ORDER — METOPROLOL SUCCINATE 25 MG/1
TABLET, EXTENDED RELEASE ORAL
Qty: 30 TABLET | Refills: 1 | Status: SHIPPED | OUTPATIENT
Start: 2019-01-11 | End: 2019-03-17 | Stop reason: SDUPTHER

## 2019-01-29 ENCOUNTER — OFFICE VISIT (OUTPATIENT)
Dept: FAMILY MEDICINE CLINIC | Age: 74
End: 2019-01-29
Payer: MEDICARE

## 2019-01-29 VITALS
WEIGHT: 218 LBS | BODY MASS INDEX: 42.58 KG/M2 | HEART RATE: 66 BPM | DIASTOLIC BLOOD PRESSURE: 78 MMHG | SYSTOLIC BLOOD PRESSURE: 132 MMHG | OXYGEN SATURATION: 95 %

## 2019-01-29 DIAGNOSIS — Z01.818 PRE-OP EXAM: Primary | ICD-10-CM

## 2019-01-29 DIAGNOSIS — R32 URINARY INCONTINENCE, UNSPECIFIED TYPE: ICD-10-CM

## 2019-01-29 LAB
ANION GAP SERPL CALCULATED.3IONS-SCNC: 11 MMOL/L (ref 3–16)
BILIRUBIN URINE: NEGATIVE
BLOOD, URINE: NEGATIVE
BUN BLDV-MCNC: 16 MG/DL (ref 7–20)
CALCIUM SERPL-MCNC: 10.3 MG/DL (ref 8.3–10.6)
CHLORIDE BLD-SCNC: 100 MMOL/L (ref 99–110)
CLARITY: CLEAR
CO2: 30 MMOL/L (ref 21–32)
COLOR: YELLOW
CREAT SERPL-MCNC: 0.5 MG/DL (ref 0.6–1.2)
EPITHELIAL CELLS, UA: 3 /HPF (ref 0–5)
GFR AFRICAN AMERICAN: >60
GFR NON-AFRICAN AMERICAN: >60
GLUCOSE BLD-MCNC: 107 MG/DL (ref 70–99)
GLUCOSE URINE: NEGATIVE MG/DL
HYALINE CASTS: 2 /LPF (ref 0–8)
KETONES, URINE: NEGATIVE MG/DL
LEUKOCYTE ESTERASE, URINE: ABNORMAL
MICROSCOPIC EXAMINATION: YES
NITRITE, URINE: NEGATIVE
PH UA: 7.5
POTASSIUM SERPL-SCNC: 4.4 MMOL/L (ref 3.5–5.1)
PROTEIN UA: NEGATIVE MG/DL
RBC UA: 3 /HPF (ref 0–4)
SODIUM BLD-SCNC: 141 MMOL/L (ref 136–145)
SPECIFIC GRAVITY UA: 1.02
URINE TYPE: ABNORMAL
UROBILINOGEN, URINE: 1 E.U./DL
WBC UA: 1 /HPF (ref 0–5)

## 2019-01-29 PROCEDURE — 1101F PT FALLS ASSESS-DOCD LE1/YR: CPT | Performed by: NURSE PRACTITIONER

## 2019-01-29 PROCEDURE — 36415 COLL VENOUS BLD VENIPUNCTURE: CPT | Performed by: NURSE PRACTITIONER

## 2019-01-29 PROCEDURE — G8427 DOCREV CUR MEDS BY ELIG CLIN: HCPCS | Performed by: NURSE PRACTITIONER

## 2019-01-29 PROCEDURE — 1090F PRES/ABSN URINE INCON ASSESS: CPT | Performed by: NURSE PRACTITIONER

## 2019-01-29 PROCEDURE — 93000 ELECTROCARDIOGRAM COMPLETE: CPT | Performed by: NURSE PRACTITIONER

## 2019-01-29 PROCEDURE — 99212 OFFICE O/P EST SF 10 MIN: CPT | Performed by: NURSE PRACTITIONER

## 2019-01-29 PROCEDURE — 0509F URINE INCON PLAN DOCD: CPT | Performed by: NURSE PRACTITIONER

## 2019-01-29 PROCEDURE — G8417 CALC BMI ABV UP PARAM F/U: HCPCS | Performed by: NURSE PRACTITIONER

## 2019-01-29 PROCEDURE — 81002 URINALYSIS NONAUTO W/O SCOPE: CPT | Performed by: NURSE PRACTITIONER

## 2019-01-29 PROCEDURE — G8484 FLU IMMUNIZE NO ADMIN: HCPCS | Performed by: NURSE PRACTITIONER

## 2019-01-29 PROCEDURE — 81003 URINALYSIS AUTO W/O SCOPE: CPT | Performed by: NURSE PRACTITIONER

## 2019-01-29 PROCEDURE — 3017F COLORECTAL CA SCREEN DOC REV: CPT | Performed by: NURSE PRACTITIONER

## 2019-01-29 RX ORDER — ATORVASTATIN CALCIUM 20 MG/1
20 TABLET, FILM COATED ORAL DAILY
Qty: 90 TABLET | Refills: 3 | Status: SHIPPED | OUTPATIENT
Start: 2019-01-29 | End: 2019-10-29 | Stop reason: SDUPTHER

## 2019-01-31 LAB — URINE CULTURE, ROUTINE: NORMAL

## 2019-02-07 ENCOUNTER — HOSPITAL ENCOUNTER (OUTPATIENT)
Age: 74
Setting detail: OUTPATIENT SURGERY
Discharge: HOME | End: 2019-02-07
Attending: UROLOGY | Admitting: UROLOGY
Payer: MEDICARE

## 2019-02-07 VITALS
SYSTOLIC BLOOD PRESSURE: 173 MMHG | OXYGEN SATURATION: 96 % | HEART RATE: 54 BPM | HEIGHT: 60 IN | TEMPERATURE: 97.9 F | DIASTOLIC BLOOD PRESSURE: 75 MMHG | BODY MASS INDEX: 42.8 KG/M2 | WEIGHT: 218 LBS | RESPIRATION RATE: 14 BRPM

## 2019-02-07 LAB
GLUCOSE BLD-MCNC: 106 MG/DL (ref 70–99)
PERFORMED ON: ABNORMAL

## 2019-02-07 PROCEDURE — 6360000002 HC RX W HCPCS: Performed by: ANESTHESIOLOGY

## 2019-02-07 PROCEDURE — 2580000003 HC RX 258: Performed by: ANESTHESIOLOGY

## 2019-02-07 RX ORDER — SODIUM CHLORIDE, SODIUM LACTATE, POTASSIUM CHLORIDE, CALCIUM CHLORIDE 600; 310; 30; 20 MG/100ML; MG/100ML; MG/100ML; MG/100ML
INJECTION, SOLUTION INTRAVENOUS CONTINUOUS
Status: DISCONTINUED | OUTPATIENT
Start: 2019-02-07 | End: 2019-02-08 | Stop reason: HOSPADM

## 2019-02-07 RX ORDER — LIDOCAINE HYDROCHLORIDE 10 MG/ML
2 INJECTION, SOLUTION INFILTRATION; PERINEURAL
Status: ACTIVE | OUTPATIENT
Start: 2019-02-07 | End: 2019-02-07

## 2019-02-07 RX ORDER — HYDRALAZINE HYDROCHLORIDE 20 MG/ML
10 INJECTION INTRAMUSCULAR; INTRAVENOUS ONCE
Status: COMPLETED | OUTPATIENT
Start: 2019-02-07 | End: 2019-02-07

## 2019-02-07 RX ADMIN — SODIUM CHLORIDE, POTASSIUM CHLORIDE, SODIUM LACTATE AND CALCIUM CHLORIDE: 600; 310; 30; 20 INJECTION, SOLUTION INTRAVENOUS at 16:07

## 2019-02-07 RX ADMIN — HYDRALAZINE HYDROCHLORIDE 10 MG: 20 INJECTION INTRAMUSCULAR; INTRAVENOUS at 16:07

## 2019-02-07 ASSESSMENT — PAIN - FUNCTIONAL ASSESSMENT: PAIN_FUNCTIONAL_ASSESSMENT: 0-10

## 2019-02-14 ENCOUNTER — ANESTHESIA EVENT (OUTPATIENT)
Dept: OPERATING ROOM | Age: 74
End: 2019-02-14
Payer: MEDICARE

## 2019-02-15 ENCOUNTER — ANESTHESIA (OUTPATIENT)
Dept: OPERATING ROOM | Age: 74
End: 2019-02-15
Payer: MEDICARE

## 2019-02-15 ENCOUNTER — HOSPITAL ENCOUNTER (OUTPATIENT)
Age: 74
Setting detail: OUTPATIENT SURGERY
Discharge: HOME | End: 2019-02-15
Attending: UROLOGY | Admitting: UROLOGY
Payer: MEDICARE

## 2019-02-15 VITALS
HEIGHT: 60 IN | SYSTOLIC BLOOD PRESSURE: 137 MMHG | OXYGEN SATURATION: 94 % | WEIGHT: 218 LBS | RESPIRATION RATE: 16 BRPM | BODY MASS INDEX: 42.8 KG/M2 | DIASTOLIC BLOOD PRESSURE: 68 MMHG | TEMPERATURE: 97.8 F | HEART RATE: 63 BPM

## 2019-02-15 VITALS — DIASTOLIC BLOOD PRESSURE: 81 MMHG | TEMPERATURE: 97 F | OXYGEN SATURATION: 100 % | SYSTOLIC BLOOD PRESSURE: 156 MMHG

## 2019-02-15 DIAGNOSIS — Z98.890 HISTORY OF SUBURETHRAL SLING PROCEDURE: ICD-10-CM

## 2019-02-15 DIAGNOSIS — G89.18 POSTOPERATIVE PAIN: Primary | ICD-10-CM

## 2019-02-15 PROBLEM — N39.3 SUI (STRESS URINARY INCONTINENCE, FEMALE): Status: ACTIVE | Noted: 2019-02-15

## 2019-02-15 LAB
GLUCOSE BLD-MCNC: 111 MG/DL (ref 70–99)
GLUCOSE BLD-MCNC: 122 MG/DL (ref 70–99)
PERFORMED ON: ABNORMAL
PERFORMED ON: ABNORMAL

## 2019-02-15 PROCEDURE — 7100000001 HC PACU RECOVERY - ADDTL 15 MIN: Performed by: UROLOGY

## 2019-02-15 PROCEDURE — 2709999900 HC NON-CHARGEABLE SUPPLY: Performed by: UROLOGY

## 2019-02-15 PROCEDURE — 3700000001 HC ADD 15 MINUTES (ANESTHESIA): Performed by: UROLOGY

## 2019-02-15 PROCEDURE — 3600000004 HC SURGERY LEVEL 4 BASE: Performed by: UROLOGY

## 2019-02-15 PROCEDURE — 2500000003 HC RX 250 WO HCPCS: Performed by: NURSE ANESTHETIST, CERTIFIED REGISTERED

## 2019-02-15 PROCEDURE — 6360000002 HC RX W HCPCS: Performed by: UROLOGY

## 2019-02-15 PROCEDURE — 2580000003 HC RX 258: Performed by: ANESTHESIOLOGY

## 2019-02-15 PROCEDURE — C1771 REP DEV, URINARY, W/SLING: HCPCS | Performed by: UROLOGY

## 2019-02-15 PROCEDURE — 3700000000 HC ANESTHESIA ATTENDED CARE: Performed by: UROLOGY

## 2019-02-15 PROCEDURE — 2500000003 HC RX 250 WO HCPCS: Performed by: UROLOGY

## 2019-02-15 PROCEDURE — 2580000003 HC RX 258: Performed by: UROLOGY

## 2019-02-15 PROCEDURE — 7100000000 HC PACU RECOVERY - FIRST 15 MIN: Performed by: UROLOGY

## 2019-02-15 PROCEDURE — 7100000011 HC PHASE II RECOVERY - ADDTL 15 MIN: Performed by: UROLOGY

## 2019-02-15 PROCEDURE — 6370000000 HC RX 637 (ALT 250 FOR IP): Performed by: UROLOGY

## 2019-02-15 PROCEDURE — 6360000002 HC RX W HCPCS: Performed by: NURSE ANESTHETIST, CERTIFIED REGISTERED

## 2019-02-15 PROCEDURE — 7100000010 HC PHASE II RECOVERY - FIRST 15 MIN: Performed by: UROLOGY

## 2019-02-15 PROCEDURE — 3600000014 HC SURGERY LEVEL 4 ADDTL 15MIN: Performed by: UROLOGY

## 2019-02-15 DEVICE — SUPRAPUBIC MID-URETHRAL SLING
Type: IMPLANTABLE DEVICE | Status: FUNCTIONAL
Brand: LYNX™ SYSTEM

## 2019-02-15 RX ORDER — SENNA AND DOCUSATE SODIUM 50; 8.6 MG/1; MG/1
1 TABLET, FILM COATED ORAL DAILY
Qty: 14 TABLET | Refills: 1 | Status: SHIPPED | OUTPATIENT
Start: 2019-02-15 | End: 2019-03-01

## 2019-02-15 RX ORDER — BUPIVACAINE HYDROCHLORIDE 5 MG/ML
INJECTION, SOLUTION EPIDURAL; INTRACAUDAL PRN
Status: DISCONTINUED | OUTPATIENT
Start: 2019-02-15 | End: 2019-02-15 | Stop reason: ALTCHOICE

## 2019-02-15 RX ORDER — ONDANSETRON 2 MG/ML
INJECTION INTRAMUSCULAR; INTRAVENOUS PRN
Status: DISCONTINUED | OUTPATIENT
Start: 2019-02-15 | End: 2019-02-15 | Stop reason: SDUPTHER

## 2019-02-15 RX ORDER — HYDRALAZINE HYDROCHLORIDE 20 MG/ML
5 INJECTION INTRAMUSCULAR; INTRAVENOUS EVERY 10 MIN PRN
Status: DISCONTINUED | OUTPATIENT
Start: 2019-02-15 | End: 2019-02-15 | Stop reason: HOSPADM

## 2019-02-15 RX ORDER — DEXAMETHASONE SODIUM PHOSPHATE 4 MG/ML
INJECTION, SOLUTION INTRA-ARTICULAR; INTRALESIONAL; INTRAMUSCULAR; INTRAVENOUS; SOFT TISSUE PRN
Status: DISCONTINUED | OUTPATIENT
Start: 2019-02-15 | End: 2019-02-15 | Stop reason: SDUPTHER

## 2019-02-15 RX ORDER — OXYCODONE HYDROCHLORIDE AND ACETAMINOPHEN 5; 325 MG/1; MG/1
2 TABLET ORAL PRN
Status: DISCONTINUED | OUTPATIENT
Start: 2019-02-15 | End: 2019-02-15 | Stop reason: HOSPADM

## 2019-02-15 RX ORDER — HYDROMORPHONE HCL 110MG/55ML
0.5 PATIENT CONTROLLED ANALGESIA SYRINGE INTRAVENOUS EVERY 5 MIN PRN
Status: DISCONTINUED | OUTPATIENT
Start: 2019-02-15 | End: 2019-02-15 | Stop reason: HOSPADM

## 2019-02-15 RX ORDER — HYDROMORPHONE HCL 110MG/55ML
0.25 PATIENT CONTROLLED ANALGESIA SYRINGE INTRAVENOUS EVERY 5 MIN PRN
Status: DISCONTINUED | OUTPATIENT
Start: 2019-02-15 | End: 2019-02-15 | Stop reason: HOSPADM

## 2019-02-15 RX ORDER — ROCURONIUM BROMIDE 10 MG/ML
INJECTION, SOLUTION INTRAVENOUS PRN
Status: DISCONTINUED | OUTPATIENT
Start: 2019-02-15 | End: 2019-02-15 | Stop reason: SDUPTHER

## 2019-02-15 RX ORDER — LIDOCAINE HYDROCHLORIDE 20 MG/ML
INJECTION, SOLUTION INFILTRATION; PERINEURAL PRN
Status: DISCONTINUED | OUTPATIENT
Start: 2019-02-15 | End: 2019-02-15 | Stop reason: SDUPTHER

## 2019-02-15 RX ORDER — MAGNESIUM HYDROXIDE 1200 MG/15ML
LIQUID ORAL PRN
Status: DISCONTINUED | OUTPATIENT
Start: 2019-02-15 | End: 2019-02-15 | Stop reason: ALTCHOICE

## 2019-02-15 RX ORDER — PROPOFOL 10 MG/ML
INJECTION, EMULSION INTRAVENOUS PRN
Status: DISCONTINUED | OUTPATIENT
Start: 2019-02-15 | End: 2019-02-15 | Stop reason: SDUPTHER

## 2019-02-15 RX ORDER — EPHEDRINE SULFATE 50 MG/ML
INJECTION INTRAVENOUS PRN
Status: DISCONTINUED | OUTPATIENT
Start: 2019-02-15 | End: 2019-02-15 | Stop reason: SDUPTHER

## 2019-02-15 RX ORDER — FENTANYL CITRATE 50 UG/ML
INJECTION, SOLUTION INTRAMUSCULAR; INTRAVENOUS PRN
Status: DISCONTINUED | OUTPATIENT
Start: 2019-02-15 | End: 2019-02-15 | Stop reason: SDUPTHER

## 2019-02-15 RX ORDER — ONDANSETRON 2 MG/ML
4 INJECTION INTRAMUSCULAR; INTRAVENOUS EVERY 10 MIN PRN
Status: DISCONTINUED | OUTPATIENT
Start: 2019-02-15 | End: 2019-02-15 | Stop reason: HOSPADM

## 2019-02-15 RX ORDER — LABETALOL HYDROCHLORIDE 5 MG/ML
5 INJECTION, SOLUTION INTRAVENOUS EVERY 10 MIN PRN
Status: DISCONTINUED | OUTPATIENT
Start: 2019-02-15 | End: 2019-02-15 | Stop reason: HOSPADM

## 2019-02-15 RX ORDER — MAGNESIUM HYDROXIDE 1200 MG/15ML
LIQUID ORAL CONTINUOUS PRN
Status: COMPLETED | OUTPATIENT
Start: 2019-02-15 | End: 2019-02-15

## 2019-02-15 RX ORDER — GLYCOPYRROLATE 0.2 MG/ML
INJECTION INTRAMUSCULAR; INTRAVENOUS PRN
Status: DISCONTINUED | OUTPATIENT
Start: 2019-02-15 | End: 2019-02-15 | Stop reason: SDUPTHER

## 2019-02-15 RX ORDER — KETOROLAC TROMETHAMINE 30 MG/ML
15 INJECTION, SOLUTION INTRAMUSCULAR; INTRAVENOUS EVERY 6 HOURS PRN
Status: DISCONTINUED | OUTPATIENT
Start: 2019-02-15 | End: 2019-02-15 | Stop reason: HOSPADM

## 2019-02-15 RX ORDER — MEPERIDINE HYDROCHLORIDE 25 MG/ML
12.5 INJECTION INTRAMUSCULAR; INTRAVENOUS; SUBCUTANEOUS EVERY 5 MIN PRN
Status: DISCONTINUED | OUTPATIENT
Start: 2019-02-15 | End: 2019-02-15 | Stop reason: HOSPADM

## 2019-02-15 RX ORDER — OXYCODONE HYDROCHLORIDE AND ACETAMINOPHEN 5; 325 MG/1; MG/1
1 TABLET ORAL EVERY 6 HOURS PRN
Qty: 28 TABLET | Refills: 0 | Status: SHIPPED | OUTPATIENT
Start: 2019-02-15 | End: 2019-02-22

## 2019-02-15 RX ORDER — OXYCODONE HYDROCHLORIDE AND ACETAMINOPHEN 5; 325 MG/1; MG/1
1 TABLET ORAL PRN
Status: DISCONTINUED | OUTPATIENT
Start: 2019-02-15 | End: 2019-02-15 | Stop reason: HOSPADM

## 2019-02-15 RX ORDER — SODIUM CHLORIDE, SODIUM LACTATE, POTASSIUM CHLORIDE, CALCIUM CHLORIDE 600; 310; 30; 20 MG/100ML; MG/100ML; MG/100ML; MG/100ML
INJECTION, SOLUTION INTRAVENOUS CONTINUOUS
Status: DISCONTINUED | OUTPATIENT
Start: 2019-02-15 | End: 2019-02-15 | Stop reason: HOSPADM

## 2019-02-15 RX ADMIN — PROPOFOL 200 MG: 10 INJECTION, EMULSION INTRAVENOUS at 14:32

## 2019-02-15 RX ADMIN — ONDANSETRON 4 MG: 2 INJECTION, SOLUTION INTRAMUSCULAR; INTRAVENOUS at 14:40

## 2019-02-15 RX ADMIN — SUGAMMADEX 200 MG: 100 INJECTION, SOLUTION INTRAVENOUS at 15:36

## 2019-02-15 RX ADMIN — SODIUM CHLORIDE, POTASSIUM CHLORIDE, SODIUM LACTATE AND CALCIUM CHLORIDE: 600; 310; 30; 20 INJECTION, SOLUTION INTRAVENOUS at 14:28

## 2019-02-15 RX ADMIN — EPHEDRINE SULFATE 5 MG: 50 INJECTION INTRAVENOUS at 14:46

## 2019-02-15 RX ADMIN — FENTANYL CITRATE 50 MCG: 50 INJECTION INTRAMUSCULAR; INTRAVENOUS at 14:28

## 2019-02-15 RX ADMIN — EPHEDRINE SULFATE 5 MG: 50 INJECTION INTRAVENOUS at 14:39

## 2019-02-15 RX ADMIN — GLYCOPYRROLATE 0.1 MG: 0.2 INJECTION, SOLUTION INTRAMUSCULAR; INTRAVENOUS at 15:08

## 2019-02-15 RX ADMIN — LIDOCAINE HYDROCHLORIDE 50 MG: 20 INJECTION, SOLUTION INFILTRATION; PERINEURAL at 14:32

## 2019-02-15 RX ADMIN — ROCURONIUM BROMIDE 50 MG: 10 INJECTION, SOLUTION INTRAVENOUS at 14:32

## 2019-02-15 RX ADMIN — DEXAMETHASONE SODIUM PHOSPHATE 10 MG: 4 INJECTION, SOLUTION INTRAMUSCULAR; INTRAVENOUS at 14:40

## 2019-02-15 RX ADMIN — FENTANYL CITRATE 50 MCG: 50 INJECTION INTRAMUSCULAR; INTRAVENOUS at 14:32

## 2019-02-15 RX ADMIN — SUGAMMADEX 100 MG: 100 INJECTION, SOLUTION INTRAVENOUS at 15:43

## 2019-02-15 RX ADMIN — GLYCOPYRROLATE 0.1 MG: 0.2 INJECTION, SOLUTION INTRAMUSCULAR; INTRAVENOUS at 15:14

## 2019-02-15 RX ADMIN — SODIUM CHLORIDE, POTASSIUM CHLORIDE, SODIUM LACTATE AND CALCIUM CHLORIDE 1000 ML: 600; 310; 30; 20 INJECTION, SOLUTION INTRAVENOUS at 16:16

## 2019-02-15 RX ADMIN — Medication 2 G: at 14:26

## 2019-02-15 RX ADMIN — EPHEDRINE SULFATE 5 MG: 50 INJECTION INTRAVENOUS at 15:09

## 2019-02-15 RX ADMIN — ROCURONIUM BROMIDE 10 MG: 10 INJECTION, SOLUTION INTRAVENOUS at 14:52

## 2019-02-15 ASSESSMENT — PULMONARY FUNCTION TESTS
PIF_VALUE: 21
PIF_VALUE: 28
PIF_VALUE: 21
PIF_VALUE: 21
PIF_VALUE: 3
PIF_VALUE: 25
PIF_VALUE: 21
PIF_VALUE: 17
PIF_VALUE: 21
PIF_VALUE: 23
PIF_VALUE: 21
PIF_VALUE: 23
PIF_VALUE: 21
PIF_VALUE: 17
PIF_VALUE: 1
PIF_VALUE: 17
PIF_VALUE: 21
PIF_VALUE: 21
PIF_VALUE: 29
PIF_VALUE: 21
PIF_VALUE: 23
PIF_VALUE: 24
PIF_VALUE: 21
PIF_VALUE: 5
PIF_VALUE: 21
PIF_VALUE: 17
PIF_VALUE: 23
PIF_VALUE: 21
PIF_VALUE: 21
PIF_VALUE: 24
PIF_VALUE: 23
PIF_VALUE: 21
PIF_VALUE: 23
PIF_VALUE: 21
PIF_VALUE: 17
PIF_VALUE: 9
PIF_VALUE: 21
PIF_VALUE: 23
PIF_VALUE: 21
PIF_VALUE: 7
PIF_VALUE: 21
PIF_VALUE: 17
PIF_VALUE: 4
PIF_VALUE: 21
PIF_VALUE: 26
PIF_VALUE: 4
PIF_VALUE: 17
PIF_VALUE: 1
PIF_VALUE: 23
PIF_VALUE: 21
PIF_VALUE: 25
PIF_VALUE: 21
PIF_VALUE: 9
PIF_VALUE: 21
PIF_VALUE: 17
PIF_VALUE: 3
PIF_VALUE: 25
PIF_VALUE: 21
PIF_VALUE: 17
PIF_VALUE: 1

## 2019-02-15 ASSESSMENT — PAIN - FUNCTIONAL ASSESSMENT: PAIN_FUNCTIONAL_ASSESSMENT: 0-10

## 2019-02-25 RX ORDER — LOSARTAN POTASSIUM AND HYDROCHLOROTHIAZIDE 25; 100 MG/1; MG/1
TABLET ORAL
Qty: 30 TABLET | Refills: 2 | Status: SHIPPED | OUTPATIENT
Start: 2019-02-25 | End: 2019-04-30 | Stop reason: SDUPTHER

## 2019-02-25 RX ORDER — AMLODIPINE BESYLATE 5 MG/1
TABLET ORAL
Qty: 30 TABLET | Refills: 2 | Status: SHIPPED | OUTPATIENT
Start: 2019-02-25 | End: 2019-04-30 | Stop reason: SDUPTHER

## 2019-03-17 DIAGNOSIS — I10 ESSENTIAL HYPERTENSION: ICD-10-CM

## 2019-03-18 RX ORDER — METOPROLOL SUCCINATE 25 MG/1
TABLET, EXTENDED RELEASE ORAL
Qty: 30 TABLET | Refills: 0 | Status: SHIPPED | OUTPATIENT
Start: 2019-03-18 | End: 2019-04-21 | Stop reason: SDUPTHER

## 2019-04-21 DIAGNOSIS — I10 ESSENTIAL HYPERTENSION: ICD-10-CM

## 2019-04-22 RX ORDER — METOPROLOL SUCCINATE 25 MG/1
TABLET, EXTENDED RELEASE ORAL
Qty: 30 TABLET | Refills: 0 | Status: SHIPPED | OUTPATIENT
Start: 2019-04-22 | End: 2019-04-30 | Stop reason: SDUPTHER

## 2019-04-22 NOTE — TELEPHONE ENCOUNTER
Patient called to see if Dr. Michel Frances had received the refill request on her prescription of Metoprolol. Patient was informed that the refill request was received and sent to her pharmacy. Patient informed that she also needed to make an appointment and patient is scheduled on 04.30.19.

## 2019-04-30 ENCOUNTER — OFFICE VISIT (OUTPATIENT)
Dept: FAMILY MEDICINE CLINIC | Age: 74
End: 2019-04-30
Payer: MEDICARE

## 2019-04-30 VITALS
HEART RATE: 64 BPM | OXYGEN SATURATION: 96 % | TEMPERATURE: 98.3 F | DIASTOLIC BLOOD PRESSURE: 70 MMHG | SYSTOLIC BLOOD PRESSURE: 116 MMHG | WEIGHT: 223 LBS | BODY MASS INDEX: 43.55 KG/M2

## 2019-04-30 DIAGNOSIS — E78.49 OTHER HYPERLIPIDEMIA: ICD-10-CM

## 2019-04-30 DIAGNOSIS — I10 ESSENTIAL HYPERTENSION: Primary | ICD-10-CM

## 2019-04-30 DIAGNOSIS — F32.A DEPRESSION, UNSPECIFIED DEPRESSION TYPE: ICD-10-CM

## 2019-04-30 DIAGNOSIS — R73.03 PREDIABETES: ICD-10-CM

## 2019-04-30 LAB
CHOLESTEROL, TOTAL: 150 MG/DL (ref 0–199)
HDLC SERPL-MCNC: 70 MG/DL (ref 40–60)
LDL CHOLESTEROL CALCULATED: 64 MG/DL
TRIGL SERPL-MCNC: 82 MG/DL (ref 0–150)
VLDLC SERPL CALC-MCNC: 16 MG/DL

## 2019-04-30 PROCEDURE — 1036F TOBACCO NON-USER: CPT | Performed by: FAMILY MEDICINE

## 2019-04-30 PROCEDURE — G8399 PT W/DXA RESULTS DOCUMENT: HCPCS | Performed by: FAMILY MEDICINE

## 2019-04-30 PROCEDURE — 1090F PRES/ABSN URINE INCON ASSESS: CPT | Performed by: FAMILY MEDICINE

## 2019-04-30 PROCEDURE — G8417 CALC BMI ABV UP PARAM F/U: HCPCS | Performed by: FAMILY MEDICINE

## 2019-04-30 PROCEDURE — 1123F ACP DISCUSS/DSCN MKR DOCD: CPT | Performed by: FAMILY MEDICINE

## 2019-04-30 PROCEDURE — 4040F PNEUMOC VAC/ADMIN/RCVD: CPT | Performed by: FAMILY MEDICINE

## 2019-04-30 PROCEDURE — 36415 COLL VENOUS BLD VENIPUNCTURE: CPT | Performed by: FAMILY MEDICINE

## 2019-04-30 PROCEDURE — 99214 OFFICE O/P EST MOD 30 MIN: CPT | Performed by: FAMILY MEDICINE

## 2019-04-30 PROCEDURE — G8427 DOCREV CUR MEDS BY ELIG CLIN: HCPCS | Performed by: FAMILY MEDICINE

## 2019-04-30 PROCEDURE — 3017F COLORECTAL CA SCREEN DOC REV: CPT | Performed by: FAMILY MEDICINE

## 2019-04-30 RX ORDER — METOPROLOL SUCCINATE 100 MG/1
TABLET, EXTENDED RELEASE ORAL
Qty: 90 TABLET | Refills: 1 | Status: SHIPPED | OUTPATIENT
Start: 2019-04-30 | End: 2019-10-29 | Stop reason: SDUPTHER

## 2019-04-30 RX ORDER — LOSARTAN POTASSIUM AND HYDROCHLOROTHIAZIDE 25; 100 MG/1; MG/1
TABLET ORAL
Qty: 90 TABLET | Refills: 1 | Status: SHIPPED | OUTPATIENT
Start: 2019-04-30 | End: 2019-10-29 | Stop reason: SDUPTHER

## 2019-04-30 RX ORDER — METOPROLOL SUCCINATE 25 MG/1
25 TABLET, EXTENDED RELEASE ORAL DAILY
Qty: 90 TABLET | Refills: 1 | Status: SHIPPED | OUTPATIENT
Start: 2019-04-30 | End: 2019-10-29 | Stop reason: SDUPTHER

## 2019-04-30 RX ORDER — AMLODIPINE BESYLATE 5 MG/1
TABLET ORAL
Qty: 90 TABLET | Refills: 1 | Status: SHIPPED | OUTPATIENT
Start: 2019-04-30 | End: 2019-10-29 | Stop reason: SDUPTHER

## 2019-04-30 ASSESSMENT — PATIENT HEALTH QUESTIONNAIRE - PHQ9
SUM OF ALL RESPONSES TO PHQ QUESTIONS 1-9: 0
SUM OF ALL RESPONSES TO PHQ9 QUESTIONS 1 & 2: 0
1. LITTLE INTEREST OR PLEASURE IN DOING THINGS: 0
2. FEELING DOWN, DEPRESSED OR HOPELESS: 0
SUM OF ALL RESPONSES TO PHQ QUESTIONS 1-9: 0

## 2019-04-30 ASSESSMENT — ENCOUNTER SYMPTOMS: SHORTNESS OF BREATH: 0

## 2019-04-30 NOTE — PROGRESS NOTES
Chief Complaint   Patient presents with    6 Month Follow-Up         HPI      75 y.o. female presents today for follow-up. Hx of HTN reports compliance with BP medications, denies any medication SE. BP today  16/70     Hx of prediabetes reports compliance with metformin without SE     Follows with Dr. Yari Leal in psychiatry for anxiety and depression. Currently on Zoloft 150mg and doing well. . Denies medication SE or SI/HI     Hx of glaucoma follows eye doctor every 6 months.      Hx of HLD reports compliance with Lipitor 20 mg without medication side effects.         Patient Active Problem List   Diagnosis    HTN (hypertension)    Fibromyalgia    Prediabetes    Primary localized osteoarthrosis, lower leg    History of total knee arthroplasty, bilateral    Glaucoma    Sebaceous cyst of skin of right breast    TANIKA (stress urinary incontinence, female)     Past Medical History:   Diagnosis Date    Anxiety     Arthritis     Cancer ([facility] [address]     endometria    Depression     Diabetes mellitus ([facility] [address]     pre diabetic but take medications    Diverticulitis of colon 12/2012    possible    Fibromyalgia     Glaucoma     Heart murmur     Hyperlipidemia     not currently taking meds    Hypertension     Occult blood in stools     Osteopenia     Prediabetes 2/20/2013    Urinary incontinence        Past Surgical History:   Procedure Laterality Date    CATARACT REMOVAL WITH IMPLANT Left t    COLONOSCOPY  12/03/02 01/18/13    hemorrhoids, diverticulosis    COLONOSCOPY  2/26/16    normal    CYST REMOVAL Right 08/24/2018    removal rifh chest wall sebacious cyst    CYSTOSCOPY N/A 02/15/2019    RIGID CYSTOSCOPY, MID-URETHRAL SLING PLACEMENT     DILATION AND CURETTAGE OF UTERUS      EYE SURGERY Right 8/16/13    phaco with IOL    EYE SURGERY Left 2013    cataract removal with implant    HEMORRHOID SURGERY      HYSTERECTOMY      JOINT REPLACEMENT Left 2010    knee    JOINT REPLACEMENT Right 09/14/2016    KNEE ARTHROPLASTY      LT    KNEE SURGERY      PA EXC SKIN BENIG <5MM REMAINDR BODY N/A 8/24/2018    REMOVAL OF SEBACEOUS CYST - CHEST WALL performed by Antoinette Hedrick MD at [facility] N/A 2/15/2019    RIGID CYSTOSCOPY, MID-URETHRAL SLING PLACEMENT performed by Viraj Rush MD at [address] [facility] [address]  2/26/16    WISDOM TOOTH EXTRACTION       Most Recent Immunizations   Administered Date(s) Administered    Influenza Vaccine, unspecified formulation 11/01/2016    Influenza, High Dose (Fluzone 65 yrs and older) 09/25/2015    Pneumococcal 13-valent Conjugate ([id number]) 09/25/2015    Pneumococcal Polysaccharide ([id number]) 02/20/2013    Tdap (Boostrix, Adacel) 06/13/2018    Zoster Live (Zostavax) 06/12/2012    Zoster Subunit (Shingrix) 10/27/2018        Current Outpatient Medications   Medication Sig Dispense Refill    amLODIPine (NORVASC) 5 MG tablet TAKE ONE TABLET BY MOUTH DAILY 90 tablet 1    losartan-hydrochlorothiazide (HYZAAR) 100-25 MG per tablet TAKE ONE TABLET BY MOUTH DAILY 90 tablet 1    metFORMIN (GLUCOPHAGE) 500 MG tablet TAKE ONE TABLET BY MOUTH TWICE A DAY WITH MEALS 180 tablet 1    metoprolol succinate (TOPROL XL) 100 MG extended release tablet TAKE ONE TABLET BY MOUTH DAILY (TAKE WITH THE 25 MG DOSE FOR A TOTAL DOSE  MG DAILY) 90 tablet 1    metoprolol succinate (TOPROL XL) 25 MG extended release tablet Take 1 tablet by mouth daily 90 tablet 1    conjugated estrogens (PREMARIN) 0.625 MG/GM vaginal cream Place vaginally daily for 3 weeks 1 Tube 3    atorvastatin (LIPITOR) 20 MG tablet Take 1 tablet by mouth daily 90 tablet 3    aspirin 81 MG tablet Take 1 tablet by mouth daily 90 tablet 1    latanoprost (XALATAN) 0.005 % ophthalmic solution Place 1 drop into the right eye nightly 3 Bottle 0    sertraline (ZOLOFT) 100 MG tablet Take 1.5 tablets by mouth daily (Patient taking differently: Take 150 mg by mouth nightly ) 90 tablet 0    timolol (TIMOPTIC) 0.5 % ophthalmic solution Place 1 drop into both eyes 2 times daily 3 Bottle 0    Multiple Vitamins-Minerals (MULTIVITAL PO) Take  by mouth. No current facility-administered medications for this visit.       Allergies   Allergen Reactions    Norco [Hydrocodone-Acetaminophen] Nausea Only    Ace Inhibitors Rash     Coughing and itching    Tape Tereasa Pluck Tape] Rash       Social History     Socioeconomic History    Marital status:      Spouse name: None    Number of children: 3    Years of education: None    Highest education level: None   Occupational History    None   Social Needs    Financial resource strain: None    Food insecurity:     Worry: None     Inability: None    Transportation needs:     Medical: None     Non-medical: None   Tobacco Use    Smoking status: Former Smoker     Packs/day: 0.25     Years: 1.00     Pack years: 0.25     Last attempt to quit: 1968     Years since quittin.3    Smokeless tobacco: Never Used   Substance and Sexual Activity    Alcohol use: No     Alcohol/week: 0.0 oz    Drug use: No    Sexual activity: None   Lifestyle    Physical activity:     Days per week: None     Minutes per session: None    Stress: None   Relationships    Social connections:     Talks on phone: None     Gets together: None     Attends Synagogue service: None     Active member of club or organization: None     Attends meetings of clubs or organizations: None     Relationship status: None    Intimate partner violence:     Fear of current or ex partner: None     Emotionally abused: None     Physically abused: None     Forced sexual activity: None   Other Topics Concern    None   Social History Narrative    None     Family History   Problem Relation Age of Onset    Cancer Mother         Uterine    High Blood Pressure Mother     Cancer Father         Lung    High Blood Pressure Father     High Blood Pressure Sister     Diabetes Brother  Heart Disease Brother     High Blood Pressure Brother     High Blood Pressure Sister     No Known Problems Sister     Heart Disease Brother                               Review Of Systems    Review of Systems   Constitutional: Negative for chills and fever. Respiratory: Negative for shortness of breath. Cardiovascular: Negative for chest pain. Neurological: Negative for dizziness and headaches. PHYSICAL EXAMINATION:    /70   Pulse 64   Temp 98.3 °F (36.8 °C)   Wt 223 lb (101.2 kg)   LMP  (LMP Unknown)   SpO2 96%   BMI 43.55 kg/m²      Physical Exam   Constitutional: She is oriented to person, place, and time. She appears well-developed and well-nourished. No distress. HENT:   Head: Normocephalic and atraumatic. Right Ear: External ear normal.   Left Ear: External ear normal.   Eyes: Conjunctivae and EOM are normal.   Cardiovascular: Normal rate, regular rhythm and normal heart sounds. Pulmonary/Chest: Effort normal. No stridor. No respiratory distress. She has no wheezes. Musculoskeletal: She exhibits no edema. Neurological: She is alert and oriented to person, place, and time. Skin: Skin is warm and dry. She is not diaphoretic. Vitals reviewed. ASSESSMENT:   Well Adult, See encounter diagnoses  Vale Garcia was seen today for 6 month follow-up. Diagnoses and all orders for this visit:    Prediabetes  -     Hemoglobin A1C  -     metFORMIN (GLUCOPHAGE) 500 MG tablet; TAKE ONE TABLET BY MOUTH TWICE A DAY WITH MEALS    Essential hypertension  -     amLODIPine (NORVASC) 5 MG tablet; TAKE ONE TABLET BY MOUTH DAILY  -     losartan-hydrochlorothiazide (HYZAAR) 100-25 MG per tablet; TAKE ONE TABLET BY MOUTH DAILY  -     metoprolol succinate (TOPROL XL) 100 MG extended release tablet; TAKE ONE TABLET BY MOUTH DAILY (TAKE WITH THE 25 MG DOSE FOR A TOTAL DOSE  MG DAILY)  -     metoprolol succinate (TOPROL XL) 25 MG extended release tablet;  Take 1 tablet by mouth daily    Other hyperlipidemia  -     Lipid Panel    Depression, unspecified depression type  Stable continue to follow-up with psychiatry. Plan:   See orders and medications filed with this encounter. Return in about 6 months (around 10/30/2019) for or sooner if needed.

## 2019-05-01 LAB
ESTIMATED AVERAGE GLUCOSE: 137 MG/DL
HBA1C MFR BLD: 6.4 %

## 2019-05-02 ENCOUNTER — TELEPHONE (OUTPATIENT)
Dept: FAMILY MEDICINE CLINIC | Age: 74
End: 2019-05-02

## 2019-05-02 NOTE — TELEPHONE ENCOUNTER
Patient called regarding her lab results. Patient was given Dr. Jackelin Guaman' result message. Patient informed.

## 2019-10-29 ENCOUNTER — OFFICE VISIT (OUTPATIENT)
Dept: FAMILY MEDICINE CLINIC | Age: 74
End: 2019-10-29
Payer: MEDICARE

## 2019-10-29 VITALS
HEART RATE: 60 BPM | SYSTOLIC BLOOD PRESSURE: 130 MMHG | WEIGHT: 227 LBS | HEIGHT: 60 IN | DIASTOLIC BLOOD PRESSURE: 80 MMHG | BODY MASS INDEX: 44.57 KG/M2 | OXYGEN SATURATION: 96 %

## 2019-10-29 DIAGNOSIS — I10 ESSENTIAL HYPERTENSION: ICD-10-CM

## 2019-10-29 DIAGNOSIS — E78.49 OTHER HYPERLIPIDEMIA: Primary | ICD-10-CM

## 2019-10-29 DIAGNOSIS — R73.03 PREDIABETES: ICD-10-CM

## 2019-10-29 PROCEDURE — 1123F ACP DISCUSS/DSCN MKR DOCD: CPT | Performed by: FAMILY MEDICINE

## 2019-10-29 PROCEDURE — 4040F PNEUMOC VAC/ADMIN/RCVD: CPT | Performed by: FAMILY MEDICINE

## 2019-10-29 PROCEDURE — G8427 DOCREV CUR MEDS BY ELIG CLIN: HCPCS | Performed by: FAMILY MEDICINE

## 2019-10-29 PROCEDURE — 1090F PRES/ABSN URINE INCON ASSESS: CPT | Performed by: FAMILY MEDICINE

## 2019-10-29 PROCEDURE — 3017F COLORECTAL CA SCREEN DOC REV: CPT | Performed by: FAMILY MEDICINE

## 2019-10-29 PROCEDURE — 99214 OFFICE O/P EST MOD 30 MIN: CPT | Performed by: FAMILY MEDICINE

## 2019-10-29 PROCEDURE — G8482 FLU IMMUNIZE ORDER/ADMIN: HCPCS | Performed by: FAMILY MEDICINE

## 2019-10-29 PROCEDURE — G8399 PT W/DXA RESULTS DOCUMENT: HCPCS | Performed by: FAMILY MEDICINE

## 2019-10-29 PROCEDURE — 36415 COLL VENOUS BLD VENIPUNCTURE: CPT | Performed by: FAMILY MEDICINE

## 2019-10-29 PROCEDURE — G8417 CALC BMI ABV UP PARAM F/U: HCPCS | Performed by: FAMILY MEDICINE

## 2019-10-29 PROCEDURE — 1036F TOBACCO NON-USER: CPT | Performed by: FAMILY MEDICINE

## 2019-10-29 RX ORDER — LOSARTAN POTASSIUM AND HYDROCHLOROTHIAZIDE 25; 100 MG/1; MG/1
TABLET ORAL
Qty: 90 TABLET | Refills: 1 | Status: SHIPPED | OUTPATIENT
Start: 2019-10-29 | End: 2020-05-05 | Stop reason: SDUPTHER

## 2019-10-29 RX ORDER — METOPROLOL SUCCINATE 100 MG/1
TABLET, EXTENDED RELEASE ORAL
Qty: 90 TABLET | Refills: 1 | Status: SHIPPED | OUTPATIENT
Start: 2019-10-29 | End: 2020-04-23

## 2019-10-29 RX ORDER — AMLODIPINE BESYLATE 5 MG/1
TABLET ORAL
Qty: 90 TABLET | Refills: 1 | Status: SHIPPED | OUTPATIENT
Start: 2019-10-29 | End: 2020-05-18

## 2019-10-29 RX ORDER — METOPROLOL SUCCINATE 25 MG/1
25 TABLET, EXTENDED RELEASE ORAL DAILY
Qty: 90 TABLET | Refills: 1 | Status: SHIPPED | OUTPATIENT
Start: 2019-10-29 | End: 2020-04-21

## 2019-10-29 RX ORDER — ATORVASTATIN CALCIUM 20 MG/1
20 TABLET, FILM COATED ORAL DAILY
Qty: 90 TABLET | Refills: 1 | Status: SHIPPED | OUTPATIENT
Start: 2019-10-29 | End: 2020-04-30

## 2019-10-29 ASSESSMENT — ENCOUNTER SYMPTOMS: SHORTNESS OF BREATH: 0

## 2019-10-30 LAB
ALBUMIN SERPL-MCNC: 5 G/DL (ref 3.4–5)
ALP BLD-CCNC: 83 U/L (ref 40–129)
ALT SERPL-CCNC: 17 U/L (ref 10–40)
AST SERPL-CCNC: 22 U/L (ref 15–37)
BILIRUB SERPL-MCNC: 0.7 MG/DL (ref 0–1)
BILIRUBIN DIRECT: <0.2 MG/DL (ref 0–0.3)
BILIRUBIN, INDIRECT: NORMAL MG/DL (ref 0–1)
ESTIMATED AVERAGE GLUCOSE: 128.4 MG/DL
HBA1C MFR BLD: 6.1 %
TOTAL PROTEIN: 7.2 G/DL (ref 6.4–8.2)

## 2020-02-20 ENCOUNTER — OFFICE VISIT (OUTPATIENT)
Dept: FAMILY MEDICINE CLINIC | Age: 75
End: 2020-02-20
Payer: MEDICARE

## 2020-02-20 ENCOUNTER — TELEPHONE (OUTPATIENT)
Dept: FAMILY MEDICINE CLINIC | Age: 75
End: 2020-02-20

## 2020-02-20 VITALS
OXYGEN SATURATION: 98 % | TEMPERATURE: 98.7 F | SYSTOLIC BLOOD PRESSURE: 142 MMHG | BODY MASS INDEX: 44.37 KG/M2 | WEIGHT: 227.2 LBS | HEART RATE: 61 BPM | DIASTOLIC BLOOD PRESSURE: 72 MMHG

## 2020-02-20 PROCEDURE — 1090F PRES/ABSN URINE INCON ASSESS: CPT | Performed by: NURSE PRACTITIONER

## 2020-02-20 PROCEDURE — G8482 FLU IMMUNIZE ORDER/ADMIN: HCPCS | Performed by: NURSE PRACTITIONER

## 2020-02-20 PROCEDURE — 3017F COLORECTAL CA SCREEN DOC REV: CPT | Performed by: NURSE PRACTITIONER

## 2020-02-20 PROCEDURE — 99213 OFFICE O/P EST LOW 20 MIN: CPT | Performed by: NURSE PRACTITIONER

## 2020-02-20 PROCEDURE — G8427 DOCREV CUR MEDS BY ELIG CLIN: HCPCS | Performed by: NURSE PRACTITIONER

## 2020-02-20 PROCEDURE — G8417 CALC BMI ABV UP PARAM F/U: HCPCS | Performed by: NURSE PRACTITIONER

## 2020-02-20 PROCEDURE — 4040F PNEUMOC VAC/ADMIN/RCVD: CPT | Performed by: NURSE PRACTITIONER

## 2020-02-20 PROCEDURE — G8399 PT W/DXA RESULTS DOCUMENT: HCPCS | Performed by: NURSE PRACTITIONER

## 2020-02-20 PROCEDURE — 1123F ACP DISCUSS/DSCN MKR DOCD: CPT | Performed by: NURSE PRACTITIONER

## 2020-02-20 PROCEDURE — 1036F TOBACCO NON-USER: CPT | Performed by: NURSE PRACTITIONER

## 2020-02-20 RX ORDER — BENZONATATE 100 MG/1
100 CAPSULE ORAL 3 TIMES DAILY PRN
Qty: 30 CAPSULE | Refills: 0 | Status: SHIPPED | OUTPATIENT
Start: 2020-02-20 | End: 2020-03-01

## 2020-02-20 RX ORDER — AMOXICILLIN 500 MG/1
500 CAPSULE ORAL 2 TIMES DAILY
Qty: 20 CAPSULE | Refills: 0 | Status: SHIPPED | OUTPATIENT
Start: 2020-02-20 | End: 2020-03-01

## 2020-02-20 ASSESSMENT — ENCOUNTER SYMPTOMS
RHINORRHEA: 1
COUGH: 1
CONSTIPATION: 0
WHEEZING: 1
EYE DISCHARGE: 0
HEARTBURN: 0
SHORTNESS OF BREATH: 0
HEMOPTYSIS: 0
SORE THROAT: 0
CHEST TIGHTNESS: 0
DIARRHEA: 0
ABDOMINAL PAIN: 0

## 2020-02-20 NOTE — TELEPHONE ENCOUNTER
Pt requesting Renewal of Handicap Placard. Last Seen sick visit Kiley Hensley 2/20/20. Last Seen 10/29/19 by PCP. Please Call when ready for  at [address] [facility] office.

## 2020-02-20 NOTE — PROGRESS NOTES
tablet TAKE ONE TABLET BY MOUTH DAILY Yes Darral Alpers, MD   losartan-hydrochlorothiazide (HYZAAR) 100-25 MG per tablet TAKE ONE TABLET BY MOUTH DAILY Yes Darral Alpers, MD   metFORMIN (GLUCOPHAGE) 500 MG tablet TAKE ONE TABLET BY MOUTH TWICE A DAY WITH MEALS Yes Darral Alpers, MD   metoprolol succinate (TOPROL XL) 100 MG extended release tablet TAKE ONE TABLET BY MOUTH DAILY (TAKE WITH THE 25 MG DOSE FOR A TOTAL DOSE  MG DAILY) Yes Darral Alpers, MD   metoprolol succinate (TOPROL XL) 25 MG extended release tablet Take 1 tablet by mouth daily Yes Darral Alpers, MD   atorvastatin (LIPITOR) 20 MG tablet Take 1 tablet by mouth daily Yes Darral Alpers, MD   aspirin 81 MG tablet Take 1 tablet by mouth daily Yes Darral Alpers, MD   latanoprost (XALATAN) 0.005 % ophthalmic solution Place 1 drop into the right eye nightly Yes Darral Alpers, MD   sertraline (ZOLOFT) 100 MG tablet Take 1.5 tablets by mouth daily  Patient taking differently: Take 150 mg by mouth nightly  Yes Darral Alpers, MD   timolol (TIMOPTIC) 0.5 % ophthalmic solution Place 1 drop into both eyes 2 times daily Yes Darral Alpers, MD   Multiple Vitamins-Minerals (MULTIVITAL PO) Take  by mouth. Yes Historical Provider, MD        Social History     Tobacco Use    Smoking status: Former Smoker     Packs/day: 0.25     Years: 1.00     Pack years: 0.25     Types: Cigarettes     Last attempt to quit: 1968     Years since quittin.1    Smokeless tobacco: Never Used   Substance Use Topics    Alcohol use: No     Alcohol/week: 0.0 standard drinks        Vitals:    20 1038   BP: (!) 142/72   Pulse: 61   Temp: 98.7 °F (37.1 °C)   TempSrc: Oral   SpO2: 98%   Weight: 227 lb 3.2 oz (103.1 kg)     Estimated body mass index is 44.37 kg/m² as calculated from the following:    Height as of 10/29/19: 5' (1.524 m). Weight as of this encounter: 227 lb 3.2 oz (103.1 kg). Physical Exam  Vitals signs reviewed.

## 2020-02-24 NOTE — TELEPHONE ENCOUNTER
LM notifying patient that letter was done and was scanned into chart. Just to let us know where she wants to pick it up at.

## 2020-02-24 NOTE — TELEPHONE ENCOUNTER
PT returned call, she would like to  at [facility].

## 2020-04-21 RX ORDER — METOPROLOL SUCCINATE 25 MG/1
TABLET, EXTENDED RELEASE ORAL
Qty: 30 TABLET | Refills: 3 | Status: SHIPPED | OUTPATIENT
Start: 2020-04-21 | End: 2020-05-05 | Stop reason: SDUPTHER

## 2020-04-23 RX ORDER — METOPROLOL SUCCINATE 100 MG/1
TABLET, EXTENDED RELEASE ORAL
Qty: 30 TABLET | Refills: 0 | Status: SHIPPED | OUTPATIENT
Start: 2020-04-23 | End: 2020-05-05 | Stop reason: SDUPTHER

## 2020-04-30 RX ORDER — ATORVASTATIN CALCIUM 20 MG/1
TABLET, FILM COATED ORAL
Qty: 30 TABLET | Refills: 0 | Status: SHIPPED | OUTPATIENT
Start: 2020-04-30 | End: 2020-05-05 | Stop reason: SDUPTHER

## 2020-05-05 ENCOUNTER — VIRTUAL VISIT (OUTPATIENT)
Dept: FAMILY MEDICINE CLINIC | Age: 75
End: 2020-05-05
Payer: MEDICARE

## 2020-05-05 VITALS — BODY MASS INDEX: 39.27 KG/M2 | WEIGHT: 200 LBS | HEIGHT: 60 IN

## 2020-05-05 PROCEDURE — G8427 DOCREV CUR MEDS BY ELIG CLIN: HCPCS | Performed by: FAMILY MEDICINE

## 2020-05-05 PROCEDURE — 1123F ACP DISCUSS/DSCN MKR DOCD: CPT | Performed by: FAMILY MEDICINE

## 2020-05-05 PROCEDURE — 4040F PNEUMOC VAC/ADMIN/RCVD: CPT | Performed by: FAMILY MEDICINE

## 2020-05-05 PROCEDURE — 99214 OFFICE O/P EST MOD 30 MIN: CPT | Performed by: FAMILY MEDICINE

## 2020-05-05 PROCEDURE — G8399 PT W/DXA RESULTS DOCUMENT: HCPCS | Performed by: FAMILY MEDICINE

## 2020-05-05 PROCEDURE — 1090F PRES/ABSN URINE INCON ASSESS: CPT | Performed by: FAMILY MEDICINE

## 2020-05-05 RX ORDER — ATORVASTATIN CALCIUM 20 MG/1
TABLET, FILM COATED ORAL
Qty: 90 TABLET | Refills: 1 | Status: SHIPPED | OUTPATIENT
Start: 2020-05-05 | End: 2020-11-30 | Stop reason: SDUPTHER

## 2020-05-05 RX ORDER — METOPROLOL SUCCINATE 100 MG/1
TABLET, EXTENDED RELEASE ORAL
Qty: 90 TABLET | Refills: 1 | Status: SHIPPED | OUTPATIENT
Start: 2020-05-05 | End: 2020-12-10 | Stop reason: SDUPTHER

## 2020-05-05 RX ORDER — METOPROLOL SUCCINATE 25 MG/1
TABLET, EXTENDED RELEASE ORAL
Qty: 90 TABLET | Refills: 1 | Status: SHIPPED | OUTPATIENT
Start: 2020-05-05 | End: 2021-03-22 | Stop reason: SDUPTHER

## 2020-05-05 RX ORDER — LOSARTAN POTASSIUM AND HYDROCHLOROTHIAZIDE 25; 100 MG/1; MG/1
TABLET ORAL
Qty: 90 TABLET | Refills: 1 | Status: SHIPPED | OUTPATIENT
Start: 2020-05-05 | End: 2020-11-19 | Stop reason: SDUPTHER

## 2020-05-05 RX ORDER — SERTRALINE HYDROCHLORIDE 100 MG/1
150 TABLET, FILM COATED ORAL NIGHTLY
Qty: 135 TABLET | Refills: 1 | Status: SHIPPED | OUTPATIENT
Start: 2020-05-05 | End: 2020-08-06 | Stop reason: SDUPTHER

## 2020-05-05 ASSESSMENT — ENCOUNTER SYMPTOMS: SHORTNESS OF BREATH: 0

## 2020-05-05 ASSESSMENT — PATIENT HEALTH QUESTIONNAIRE - PHQ9
SUM OF ALL RESPONSES TO PHQ QUESTIONS 1-9: 0
2. FEELING DOWN, DEPRESSED OR HOPELESS: 0
SUM OF ALL RESPONSES TO PHQ QUESTIONS 1-9: 0
SUM OF ALL RESPONSES TO PHQ9 QUESTIONS 1 & 2: 0
1. LITTLE INTEREST OR PLEASURE IN DOING THINGS: 0

## 2020-05-05 NOTE — PROGRESS NOTES
2020    TELEHEALTH EVALUATION -- Audio/Visual (During RHVGP-53 public health emergency)    HPI:    Tej Meier (:  1945) has requested an audio/video evaluation for the following concern(s):    She is feeling well and has no acute complaints.     Hx of HTN reports compliance with BP medications, denies any medication SE.     Hx of prediabetes reports compliance with metformin without SE     Follows with Dr. Nancy Alonso in psychiatry for anxiety and depression. Currently on Zoloft 150mg and doing well. . Denies medication SE or SI/HI. She would like for me to continue the medication.     Hx of glaucoma follows eye doctor every 6 months.      Hx of HLD reports compliance with Lipitor 20 mg without medication side effects.       Review of Systems   Constitutional: Negative for chills and fever. Respiratory: Negative for shortness of breath. Cardiovascular: Negative for chest pain. Prior to Visit Medications    Medication Sig Taking?  Authorizing Provider   atorvastatin (LIPITOR) 20 MG tablet TAKE ONE TABLET BY MOUTH DAILY Yes Aayush Callejas MD   losartan-hydrochlorothiazide (HYZAAR) 100-25 MG per tablet TAKE ONE TABLET BY MOUTH DAILY Yes Aayush Callejas MD   metFORMIN (GLUCOPHAGE) 500 MG tablet TAKE ONE TABLET BY MOUTH TWICE A DAY WITH MEALS Yes Aayush Callejas MD   metoprolol succinate (TOPROL XL) 100 MG extended release tablet TAKE ONE TABLET BY MOUTH DAILY - TAKE WITH 25MG DOSE FOR A TOTAL OF 125MG DAILY Yes Aayush Callejas MD   metoprolol succinate (TOPROL XL) 25 MG extended release tablet TAKE ONE TABLET BY MOUTH DAILY Yes Aayush Callejas MD   sertraline (ZOLOFT) 100 MG tablet Take 1.5 tablets by mouth nightly Yes Aayush Callejas MD   amLODIPine (NORVASC) 5 MG tablet TAKE ONE TABLET BY MOUTH DAILY Yes Aayush Callejas MD   aspirin 81 MG tablet Take 1 tablet by mouth daily Yes Aayush Callejas MD   latanoprost (XALATAN) 0.005 % ophthalmic solution Place 1 drop into the right eye nightly Yes Sterling Fan MD   timolol (TIMOPTIC) 0.5 % ophthalmic solution Place 1 drop into both eyes 2 times daily Yes Sterling Fan MD   Multiple Vitamins-Minerals (MULTIVITAL PO) Take  by mouth. Yes Historical Provider, MD       Social History     Tobacco Use    Smoking status: Former Smoker     Packs/day: 0.25     Years: 1.00     Pack years: 0.25     Types: Cigarettes     Last attempt to quit: 1968     Years since quittin.3    Smokeless tobacco: Never Used   Substance Use Topics    Alcohol use: No     Alcohol/week: 0.0 standard drinks    Drug use: No        Allergies   Allergen Reactions    Norco [Hydrocodone-Acetaminophen] Nausea Only    Ace Inhibitors Rash     Coughing and itching    Tape [Adhesive Tape] Rash       PHYSICAL EXAMINATION:  [ INSTRUCTIONS:  \"[x]\" Indicates a positive item  \"[]\" Indicates a negative item  -- DELETE ALL ITEMS NOT EXAMINED]  Vital Signs: (As obtained by patient/caregiver or practitioner observation)    Blood pressure-  Heart rate-    Respiratory rate-    Temperature-  Pulse oximetry-     Constitutional: [] Appears well-developed and well-nourished [] No apparent distress      [] Abnormal-   Mental status  [] Alert and awake  [] Oriented to person/place/time []Able to follow commands      Eyes:  EOM    []  Normal  [] Abnormal-  Sclera  []  Normal  [] Abnormal -         Discharge []  None visible  [] Abnormal -    HENT:   [] Normocephalic, atraumatic.   [] Abnormal   [] Mouth/Throat: Mucous membranes are moist.     External Ears [] Normal  [] Abnormal-     Neck: [] No visualized mass     Pulmonary/Chest: [] Respiratory effort normal.  [] No visualized signs of difficulty breathing or respiratory distress        [] Abnormal-      Musculoskeletal:   [] Normal gait with no signs of ataxia         [] Normal range of motion of neck        [] Abnormal-       Neurological:        [] No Facial Asymmetry (Cranial nerve 7 motor function) (limited exam to

## 2020-05-08 DIAGNOSIS — I10 ESSENTIAL HYPERTENSION: ICD-10-CM

## 2020-05-08 DIAGNOSIS — E78.49 OTHER HYPERLIPIDEMIA: ICD-10-CM

## 2020-05-08 DIAGNOSIS — R73.03 PREDIABETES: ICD-10-CM

## 2020-05-08 LAB
A/G RATIO: 1.4 (ref 1.1–2.2)
ALBUMIN SERPL-MCNC: 4.3 G/DL (ref 3.4–5)
ALP BLD-CCNC: 78 U/L (ref 40–129)
ALT SERPL-CCNC: 20 U/L (ref 10–40)
ANION GAP SERPL CALCULATED.3IONS-SCNC: 13 MMOL/L (ref 3–16)
AST SERPL-CCNC: 24 U/L (ref 15–37)
BILIRUB SERPL-MCNC: 0.7 MG/DL (ref 0–1)
BUN BLDV-MCNC: 15 MG/DL (ref 7–20)
CALCIUM SERPL-MCNC: 10.3 MG/DL (ref 8.3–10.6)
CHLORIDE BLD-SCNC: 100 MMOL/L (ref 99–110)
CHOLESTEROL, TOTAL: 142 MG/DL (ref 0–199)
CO2: 29 MMOL/L (ref 21–32)
CREAT SERPL-MCNC: 0.7 MG/DL (ref 0.6–1.2)
GFR AFRICAN AMERICAN: >60
GFR NON-AFRICAN AMERICAN: >60
GLOBULIN: 3 G/DL
GLUCOSE BLD-MCNC: 146 MG/DL (ref 70–99)
HDLC SERPL-MCNC: 63 MG/DL (ref 40–60)
LDL CHOLESTEROL CALCULATED: 60 MG/DL
POTASSIUM SERPL-SCNC: 4 MMOL/L (ref 3.5–5.1)
SODIUM BLD-SCNC: 142 MMOL/L (ref 136–145)
TOTAL PROTEIN: 7.3 G/DL (ref 6.4–8.2)
TRIGL SERPL-MCNC: 97 MG/DL (ref 0–150)
VLDLC SERPL CALC-MCNC: 19 MG/DL

## 2020-05-09 LAB
ESTIMATED AVERAGE GLUCOSE: 131.2 MG/DL
HBA1C MFR BLD: 6.2 %

## 2020-08-06 ENCOUNTER — TELEPHONE (OUTPATIENT)
Dept: FAMILY MEDICINE CLINIC | Age: 75
End: 2020-08-06

## 2020-08-06 RX ORDER — SERTRALINE HYDROCHLORIDE 100 MG/1
150 TABLET, FILM COATED ORAL NIGHTLY
Qty: 135 TABLET | Refills: 1 | Status: SHIPPED | OUTPATIENT
Start: 2020-08-06

## 2020-11-13 ENCOUNTER — OFFICE VISIT (OUTPATIENT)
Dept: FAMILY MEDICINE CLINIC | Age: 75
End: 2020-11-13
Payer: MEDICARE

## 2020-11-13 VITALS
SYSTOLIC BLOOD PRESSURE: 160 MMHG | OXYGEN SATURATION: 97 % | WEIGHT: 221 LBS | HEART RATE: 82 BPM | BODY MASS INDEX: 43.16 KG/M2 | TEMPERATURE: 96.9 F | DIASTOLIC BLOOD PRESSURE: 98 MMHG

## 2020-11-13 LAB
ANION GAP SERPL CALCULATED.3IONS-SCNC: 13 MMOL/L (ref 3–16)
BUN BLDV-MCNC: 16 MG/DL (ref 7–20)
CALCIUM SERPL-MCNC: 10.8 MG/DL (ref 8.3–10.6)
CHLORIDE BLD-SCNC: 98 MMOL/L (ref 99–110)
CO2: 28 MMOL/L (ref 21–32)
CREAT SERPL-MCNC: 0.7 MG/DL (ref 0.6–1.2)
GFR AFRICAN AMERICAN: >60
GFR NON-AFRICAN AMERICAN: >60
GLUCOSE BLD-MCNC: 152 MG/DL (ref 70–99)
POTASSIUM SERPL-SCNC: 3.7 MMOL/L (ref 3.5–5.1)
SODIUM BLD-SCNC: 139 MMOL/L (ref 136–145)

## 2020-11-13 PROCEDURE — G8431 POS CLIN DEPRES SCRN F/U DOC: HCPCS | Performed by: NURSE PRACTITIONER

## 2020-11-13 PROCEDURE — 1123F ACP DISCUSS/DSCN MKR DOCD: CPT | Performed by: NURSE PRACTITIONER

## 2020-11-13 PROCEDURE — G0438 PPPS, INITIAL VISIT: HCPCS | Performed by: NURSE PRACTITIONER

## 2020-11-13 PROCEDURE — 36415 COLL VENOUS BLD VENIPUNCTURE: CPT | Performed by: NURSE PRACTITIONER

## 2020-11-13 PROCEDURE — 4040F PNEUMOC VAC/ADMIN/RCVD: CPT | Performed by: NURSE PRACTITIONER

## 2020-11-13 PROCEDURE — G8484 FLU IMMUNIZE NO ADMIN: HCPCS | Performed by: NURSE PRACTITIONER

## 2020-11-13 RX ORDER — TRAZODONE HYDROCHLORIDE 50 MG/1
50 TABLET ORAL NIGHTLY
Qty: 30 TABLET | Refills: 0 | Status: SHIPPED | OUTPATIENT
Start: 2020-11-13 | End: 2020-12-01

## 2020-11-13 RX ORDER — LORAZEPAM 0.5 MG/1
0.5 TABLET ORAL 2 TIMES DAILY PRN
Qty: 28 TABLET | Refills: 0 | Status: SHIPPED | OUTPATIENT
Start: 2020-11-13 | End: 2020-11-17

## 2020-11-13 RX ORDER — AMLODIPINE BESYLATE 5 MG/1
TABLET ORAL
Qty: 90 TABLET | Refills: 1 | Status: SHIPPED | OUTPATIENT
Start: 2020-11-13 | End: 2021-05-10

## 2020-11-13 ASSESSMENT — PATIENT HEALTH QUESTIONNAIRE - PHQ9
10. IF YOU CHECKED OFF ANY PROBLEMS, HOW DIFFICULT HAVE THESE PROBLEMS MADE IT FOR YOU TO DO YOUR WORK, TAKE CARE OF THINGS AT HOME, OR GET ALONG WITH OTHER PEOPLE: 1
2. FEELING DOWN, DEPRESSED OR HOPELESS: 0
6. FEELING BAD ABOUT YOURSELF - OR THAT YOU ARE A FAILURE OR HAVE LET YOURSELF OR YOUR FAMILY DOWN: 0
SUM OF ALL RESPONSES TO PHQ QUESTIONS 1-9: 14
SUM OF ALL RESPONSES TO PHQ QUESTIONS 1-9: 14
4. FEELING TIRED OR HAVING LITTLE ENERGY: 3
5. POOR APPETITE OR OVEREATING: 0
8. MOVING OR SPEAKING SO SLOWLY THAT OTHER PEOPLE COULD HAVE NOTICED. OR THE OPPOSITE, BEING SO FIGETY OR RESTLESS THAT YOU HAVE BEEN MOVING AROUND A LOT MORE THAN USUAL: 2
SUM OF ALL RESPONSES TO PHQ QUESTIONS 1-9: 14
1. LITTLE INTEREST OR PLEASURE IN DOING THINGS: 3
SUM OF ALL RESPONSES TO PHQ9 QUESTIONS 1 & 2: 3
9. THOUGHTS THAT YOU WOULD BE BETTER OFF DEAD, OR OF HURTING YOURSELF: 0
3. TROUBLE FALLING OR STAYING ASLEEP: 3
7. TROUBLE CONCENTRATING ON THINGS, SUCH AS READING THE NEWSPAPER OR WATCHING TELEVISION: 3

## 2020-11-13 NOTE — PATIENT INSTRUCTIONS
Personalized Preventive Plan for Narcisa Ellison - 11/13/2020  Medicare offers a range of preventive health benefits. Some of the tests and screenings are paid in full while other may be subject to a deductible, co-insurance, and/or copay. Some of these benefits include a comprehensive review of your medical history including lifestyle, illnesses that may run in your family, and various assessments and screenings as appropriate. After reviewing your medical record and screening and assessments performed today your provider may have ordered immunizations, labs, imaging, and/or referrals for you. A list of these orders (if applicable) as well as your Preventive Care list are included within your After Visit Summary for your review. Other Preventive Recommendations:    · A preventive eye exam performed by an eye specialist is recommended every 1-2 years to screen for glaucoma; cataracts, macular degeneration, and other eye disorders. · A preventive dental visit is recommended every 6 months. · Try to get at least 150 minutes of exercise per week or 10,000 steps per day on a pedometer . · Order or download the FREE \"Exercise & Physical Activity: Your Everyday Guide\" from [employer]. Call 7-499.537.8621 or search [employer] online. · You need 3230-7802 mg of calcium and 9423-9341 IU of vitamin D per day. It is possible to meet your calcium requirement with diet alone, but a vitamin D supplement is usually necessary to meet this goal.  · When exposed to the sun, use a sunscreen that protects against both UVA and UVB radiation with an SPF of 30 or greater. Reapply every 2 to 3 hours or after sweating, drying off with a towel, or swimming. · Always wear a seat belt when traveling in a car. Always wear a helmet when riding a bicycle or motorcycle.

## 2020-11-13 NOTE — PROGRESS NOTES
Medicare Annual Wellness Visit  Name: Kymberly De Jesus Date: 2020   MRN: <[id number]> Sex: Female   Age: 67 y.o. Ethnicity: Non-/Non    : 1945 Race: Ivone Ledesma is here for Medicare AWV    Screenings for behavioral, psychosocial and functional/safety risks, and cognitive dysfunction are all negative except as indicated below. These results, as well as other patient data from the [address] form, are documented in Flowsheets linked to this Encounter. Having anxiety- getting worse since pandemic and election. She sweats a lot and she is having trouble sleeping. Maybe 2 hours of sleep at the most. She has an appt with Dr. Sonia Davila on the  of this month psychiatry. Allergies   Allergen Reactions    Norco [Hydrocodone-Acetaminophen] Nausea Only    Ace Inhibitors Rash     Coughing and itching    Tape [Adhesive Tape] Rash         Prior to Visit Medications    Medication Sig Taking? Authorizing Provider   amLODIPine (NORVASC) 5 MG tablet TAKE ONE TABLET BY MOUTH DAILY Yes SHEY Joyce CNP   traZODone (DESYREL) 50 MG tablet Take 1 tablet by mouth nightly Yes SHEY Joyce CNP   LORazepam (ATIVAN) 0.5 MG tablet Take 1 tablet by mouth 2 times daily as needed for Anxiety for up to 30 days.  Yes SHEY Coronel CNP   sertraline (ZOLOFT) 100 MG tablet Take 1.5 tablets by mouth nightly Yes Bradly Cockayne, MD   atorvastatin (LIPITOR) 20 MG tablet TAKE ONE TABLET BY MOUTH DAILY Yes Bradly Cockayne, MD   losartan-hydrochlorothiazide (HYZAAR) 100-25 MG per tablet TAKE ONE TABLET BY MOUTH DAILY Yes Bradly Cockayne, MD   metFORMIN (GLUCOPHAGE) 500 MG tablet TAKE ONE TABLET BY MOUTH TWICE A DAY WITH MEALS Yes Bradly Cockayne, MD   metoprolol succinate (TOPROL XL) 100 MG extended release tablet TAKE ONE TABLET BY MOUTH DAILY - TAKE WITH 25MG DOSE FOR A TOTAL OF 125MG DAILY Yes Bradly Cockayne, MD   metoprolol succinate (TOPROL XL) 25 MG extended release tablet TAKE ONE TABLET BY MOUTH DAILY Yes Carmen Topete MD   aspirin 81 MG tablet Take 1 tablet by mouth daily Yes Carmen Topete MD   latanoprost (XALATAN) 0.005 % ophthalmic solution Place 1 drop into the right eye nightly Yes Carmen Topete MD   timolol (TIMOPTIC) 0.5 % ophthalmic solution Place 1 drop into both eyes 2 times daily Yes Carmen Topete MD   Multiple Vitamins-Minerals (MULTIVITAL PO) Take  by mouth.  Yes Historical Provider, MD         Past Medical History:   Diagnosis Date    Anxiety     Arthritis     Cancer ([facility] [address]     endometria    Depression     Diabetes mellitus ([facility] [facility]     pre diabetic but take medications    Diverticulitis of colon 12/2012    possible    Fibromyalgia     Glaucoma     Heart murmur     Hyperlipidemia     not currently taking meds    Hypertension     Occult blood in stools     Osteopenia     Prediabetes 2/20/2013    Urinary incontinence        Past Surgical History:   Procedure Laterality Date    CATARACT REMOVAL WITH IMPLANT Left t    COLONOSCOPY  12/03/02 01/18/13    hemorrhoids, diverticulosis    COLONOSCOPY  2/26/16    normal    CYST REMOVAL Right 08/24/2018    removal rifh chest wall sebacious cyst    CYSTOSCOPY N/A 02/15/2019    RIGID CYSTOSCOPY, MID-URETHRAL SLING PLACEMENT     DILATION AND CURETTAGE OF UTERUS      EYE SURGERY Right 8/16/13    phaco with IOL    EYE SURGERY Left 2013    cataract removal with implant    HEMORRHOID SURGERY      HYSTERECTOMY      JOINT REPLACEMENT Left 2010    knee    JOINT REPLACEMENT Right 09/14/2016    KNEE ARTHROPLASTY      LT    KNEE SURGERY      RI EXC SKIN BENIG <5MM REMAINDR BODY N/A 8/24/2018    REMOVAL OF SEBACEOUS CYST - CHEST WALL performed by Erick Bocanegra MD at [facility] N/A 2/15/2019    RIGID CYSTOSCOPY, MID-URETHRAL SLING PLACEMENT performed by Cici Garcia MD at [address] [place] [address] 2/26/16    WISDOM TOOTH EXTRACTION           Family History   Problem Relation Age of Onset    Cancer Mother         Uterine    High Blood Pressure Mother     Cancer Father         Lung    High Blood Pressure Father     High Blood Pressure Sister     Diabetes Brother     Heart Disease Brother     High Blood Pressure Brother     High Blood Pressure Sister     No Known Problems Sister     Heart Disease Brother        CareTeam (Including outside providers/suppliers regularly involved in providing care):   Patient Care Team:  SHEY Bentley CNP as PCP - General  SHEY Bentley CNP as PCP - [facility]  MORALES Garcia as Counselor (Licensed Clinical )    Wt Readings from Last 3 Encounters:   11/13/20 221 lb (100.2 kg)   05/05/20 200 lb (90.7 kg)   02/20/20 227 lb 3.2 oz (103.1 kg)     Vitals:    11/13/20 1512 11/13/20 1544   BP: (!) 130/90 (!) 160/98   Pulse: 82    Temp: 96.9 °F (36.1 °C)    TempSrc: Temporal    SpO2: 97%    Weight: 221 lb (100.2 kg)      Body mass index is 43.16 kg/m². Based upon direct observation of the patient, evaluation of cognition reveals recent and remote memory intact. General Appearance: alert and oriented to person, place and time, well-developed and well-nourished, in no acute distress  ENT: tympanic membrane, external ear and ear canal normal bilaterally, oropharynx clear and moist with normal mucous membranes  Pulmonary/Chest: clear to auscultation bilaterally- no wheezes, rales or rhonchi, normal air movement, no respiratory distress  Cardiovascular: normal rate, normal S1 and S2, no gallops and intact distal pulses  Abdomen: soft, non-tender, non-distended, normal bowel sounds, no masses or organomegaly    Patient's complete Health Risk Assessment and screening values have been reviewed and are found in Flowsheets.  The following problems were reviewed today and where indicated follow up appointments were made and/or referrals ordered. Positive Risk Factor Screenings with Interventions:     Health Habits/Nutrition:  Health Habits/Nutrition  Do you exercise for at least 20 minutes 2-3 times per week?: (!) No  Have you lost any weight without trying in the past 3 months?: No  Do you eat fewer than 2 meals per day?: (!) Yes  Have you seen a dentist within the past year?: Yes     Health Habits/Nutrition Interventions:  · Inadequate physical activity:  patient is not ready to increase his/her physical activity level at this time Patient needs time to get her anxiety under control. Personalized Preventive Plan   Current Health Maintenance Status  Immunization History   Administered Date(s) Administered    Influenza Vaccine, unspecified formulation 11/01/2016    Influenza, High Dose (Fluzone 65 yrs and older) 09/25/2015, 10/20/2019    Pneumococcal Conjugate 13-valent ([id number]) 09/25/2015    Pneumococcal Polysaccharide ([id number]) 02/20/2013    Tdap (Boostrix, Adacel) 04/14/2008, 06/13/2018    Zoster Live (Zostavax) 06/12/2012    Zoster Recombinant (Shingrix) 08/27/2018, 10/27/2018        Health Maintenance   Topic Date Due    Annual Wellness Visit (AWV)  05/29/2019    Flu vaccine (1) 09/01/2020    Lipid screen  05/08/2021    Potassium monitoring  05/08/2021    Creatinine monitoring  05/08/2021    DTaP/Tdap/Td vaccine (3 - Td) 06/13/2028    DEXA (modify frequency per FRAX score)  Completed    Shingles Vaccine  Completed    Pneumococcal 65+ years Vaccine  Completed    Hepatitis A vaccine  Aged Out    Hepatitis B vaccine  Aged Out    Hib vaccine  Aged Out    Meningococcal (ACWY) vaccine  Aged Out     Recommendations for [place] Due: see orders and patient instructions/AVS.  . Recommended screening schedule for the next 5-10 years is provided to the patient in written form: see Patient Regla Lopez was seen today for medicare awv.     Diagnoses and all orders for this

## 2020-11-14 LAB
ESTIMATED AVERAGE GLUCOSE: 134.1 MG/DL
HBA1C MFR BLD: 6.3 %

## 2020-11-16 ENCOUNTER — TELEPHONE (OUTPATIENT)
Dept: FAMILY MEDICINE CLINIC | Age: 75
End: 2020-11-16

## 2020-11-16 NOTE — TELEPHONE ENCOUNTER
Pt's  states pt is very lethargic and sluggish since being on the ativan .5 mg. He states it does not seem like it is helping pt at all. He states he would like to discuss this with you.

## 2020-11-16 NOTE — TELEPHONE ENCOUNTER
Patient's  returned Yoana's call. Patient was scheduled for tomorrow at [facility] to see Amairani Leong.

## 2020-11-17 ENCOUNTER — OFFICE VISIT (OUTPATIENT)
Dept: FAMILY MEDICINE CLINIC | Age: 75
End: 2020-11-17
Payer: MEDICARE

## 2020-11-17 VITALS
HEART RATE: 72 BPM | BODY MASS INDEX: 43.2 KG/M2 | WEIGHT: 221.2 LBS | DIASTOLIC BLOOD PRESSURE: 74 MMHG | OXYGEN SATURATION: 95 % | TEMPERATURE: 97.1 F | SYSTOLIC BLOOD PRESSURE: 124 MMHG

## 2020-11-17 PROBLEM — E66.01 MORBIDLY OBESE (HCC): Status: ACTIVE | Noted: 2020-11-17

## 2020-11-17 PROCEDURE — G8484 FLU IMMUNIZE NO ADMIN: HCPCS | Performed by: NURSE PRACTITIONER

## 2020-11-17 PROCEDURE — G8427 DOCREV CUR MEDS BY ELIG CLIN: HCPCS | Performed by: NURSE PRACTITIONER

## 2020-11-17 PROCEDURE — 1036F TOBACCO NON-USER: CPT | Performed by: NURSE PRACTITIONER

## 2020-11-17 PROCEDURE — G8399 PT W/DXA RESULTS DOCUMENT: HCPCS | Performed by: NURSE PRACTITIONER

## 2020-11-17 PROCEDURE — 1123F ACP DISCUSS/DSCN MKR DOCD: CPT | Performed by: NURSE PRACTITIONER

## 2020-11-17 PROCEDURE — 4040F PNEUMOC VAC/ADMIN/RCVD: CPT | Performed by: NURSE PRACTITIONER

## 2020-11-17 PROCEDURE — 99213 OFFICE O/P EST LOW 20 MIN: CPT | Performed by: NURSE PRACTITIONER

## 2020-11-17 PROCEDURE — 1090F PRES/ABSN URINE INCON ASSESS: CPT | Performed by: NURSE PRACTITIONER

## 2020-11-17 PROCEDURE — G8417 CALC BMI ABV UP PARAM F/U: HCPCS | Performed by: NURSE PRACTITIONER

## 2020-11-17 RX ORDER — LORAZEPAM 1 MG/1
1 TABLET ORAL 3 TIMES DAILY PRN
Qty: 18 TABLET | Refills: 0 | Status: SHIPPED | OUTPATIENT
Start: 2020-11-17 | End: 2020-11-23

## 2020-11-17 RX ORDER — LORAZEPAM 1 MG/1
1 TABLET ORAL 3 TIMES DAILY PRN
Qty: 15 TABLET | Refills: 0 | Status: SHIPPED | OUTPATIENT
Start: 2020-11-17 | End: 2020-11-17 | Stop reason: SDUPTHER

## 2020-11-17 ASSESSMENT — ENCOUNTER SYMPTOMS
WHEEZING: 0
GASTROINTESTINAL NEGATIVE: 1
COUGH: 0
RESPIRATORY NEGATIVE: 1
SHORTNESS OF BREATH: 0

## 2020-11-17 NOTE — PROGRESS NOTES
2020     Mj Perez (:  1945) is a 67 y.o. female, here for evaluation of the following medical concerns:    HPI   Patient and her  present today with concerns still of anxiety. Her and her  have not noticed any improvement with the ativan. Her  mentions she is still lethargic and sluggish. He states in the past the ativan  Helped her to perk up. He wants to try increasing it to see if it helps. In the past she was on 1 mg of ativan and it seemed to work better. She has an appointment with Psychiatry on . He states she is just so anxious he doesn't want her to wait that long. Ativan- no noticeable results. Trazodone has helped with sleeping at night. Doing a phone video conference with Dr. Kiel Rojo next Wednesday. Review of Systems   Constitutional: Positive for fatigue. HENT: Negative. Respiratory: Negative. Negative for cough, shortness of breath and wheezing. Cardiovascular: Negative. Negative for chest pain and palpitations. Gastrointestinal: Negative. Psychiatric/Behavioral: Positive for sleep disturbance. Negative for self-injury and suicidal ideas. The patient is nervous/anxious. Prior to Visit Medications    Medication Sig Taking? Authorizing Provider   LORazepam (ATIVAN) 1 MG tablet Take 1 tablet by mouth 3 times daily as needed for Anxiety for up to 6 days.  Yes SHEY Neil CNP   amLODIPine (NORVASC) 5 MG tablet TAKE ONE TABLET BY MOUTH DAILY Yes SHEY Joyce CNP   traZODone (DESYREL) 50 MG tablet Take 1 tablet by mouth nightly Yes SHEY Neil CNP   sertraline (ZOLOFT) 100 MG tablet Take 1.5 tablets by mouth nightly Yes Manuel Fall MD   atorvastatin (LIPITOR) 20 MG tablet TAKE ONE TABLET BY MOUTH DAILY Yes Manuel Fall MD   losartan-hydrochlorothiazide (HYZAAR) 100-25 MG per tablet TAKE ONE TABLET BY MOUTH DAILY Yes Manuel Fall MD   metFORMIN (GLUCOPHAGE) 500 MG tablet TAKE ONE TABLET BY MOUTH TWICE A DAY WITH MEALS Yes Dixie Higgins MD   metoprolol succinate (TOPROL XL) 100 MG extended release tablet TAKE ONE TABLET BY MOUTH DAILY - TAKE WITH 25MG DOSE FOR A TOTAL OF 125MG DAILY Yes Dixie Higgins MD   metoprolol succinate (TOPROL XL) 25 MG extended release tablet TAKE ONE TABLET BY MOUTH DAILY Yes Dixie Higgins MD   aspirin 81 MG tablet Take 1 tablet by mouth daily Yes Dixie Higgins MD   latanoprost (XALATAN) 0.005 % ophthalmic solution Place 1 drop into the right eye nightly Yes Dixie Higgins MD   timolol (TIMOPTIC) 0.5 % ophthalmic solution Place 1 drop into both eyes 2 times daily Yes Dixie Higgins MD   Multiple Vitamins-Minerals (MULTIVITAL PO) Take  by mouth. Yes Historical Provider, MD        Social History     Tobacco Use    Smoking status: Former Smoker     Packs/day: 0.25     Years: 1.00     Pack years: 0.25     Types: Cigarettes     Last attempt to quit: 1968     Years since quittin.9    Smokeless tobacco: Never Used   Substance Use Topics    Alcohol use: No     Alcohol/week: 0.0 standard drinks        Vitals:    20 1136   BP: 124/74   Pulse: 72   Temp: 97.1 °F (36.2 °C)   SpO2: 95%   Weight: 221 lb 3.2 oz (100.3 kg)     Estimated body mass index is 43.2 kg/m² as calculated from the following:    Height as of 20: 5' (1.524 m). Weight as of this encounter: 221 lb 3.2 oz (100.3 kg). Physical Exam  Vitals signs reviewed. Neurological:      Mental Status: She is alert. Psychiatric:         Mood and Affect: Mood is anxious. Affect is tearful. Speech: Speech normal.         Behavior: Behavior normal.         ASSESSMENT/PLAN:  1. Anxiety  Will increase ativan to 1 mg, can take it up to 3 times a day as needed. Given enough to help get her to her appointment with Dr. Mayur Ritchie.  She will use the rest of the script of 0.5 mg that she has at home first. Patient and  advised that ativan can cause drowsiness, but her  states it never caused drowsiness in the past. In a ER visit at [address] [facility] in 2015 she was on ativan 1 mg at that time. They will keep the appt with Dr. Jerrod Montague as well. - LORazepam (ATIVAN) 1 MG tablet; Take 1 tablet by mouth 3 times daily as needed for Anxiety for up to 6 days. Dispense: 18 tablet; Refill: 0          An electronic signature was used to authenticate this note.     --SHEY Cordero - CNP on 11/17/2020 at 12:45 PM

## 2020-11-19 ENCOUNTER — TELEPHONE (OUTPATIENT)
Dept: FAMILY MEDICINE CLINIC | Age: 75
End: 2020-11-19

## 2020-11-19 RX ORDER — LOSARTAN POTASSIUM AND HYDROCHLOROTHIAZIDE 25; 100 MG/1; MG/1
TABLET ORAL
Qty: 90 TABLET | Refills: 1 | Status: SHIPPED | OUTPATIENT
Start: 2020-11-19 | End: 2021-05-24

## 2020-11-30 ENCOUNTER — TELEPHONE (OUTPATIENT)
Dept: FAMILY MEDICINE CLINIC | Age: 75
End: 2020-11-30

## 2020-11-30 RX ORDER — ATORVASTATIN CALCIUM 20 MG/1
TABLET, FILM COATED ORAL
Qty: 90 TABLET | Refills: 1 | Status: SHIPPED | OUTPATIENT
Start: 2020-11-30 | End: 2021-05-28

## 2020-11-30 NOTE — TELEPHONE ENCOUNTER
----- Message from Duke Fortune sent at 11/27/2020 10:42 AM EST -----  Subject: Appointment Request    Reason for Call: Urgent (Patient Request) No Script    QUESTIONS  Type of Appointment? Established Patient  Reason for appointment request? No appointments available during search  Additional Information for Provider? Patient was offered VV on 12/3 but   declined. She thinks she is having UTI symptoms and wants to come in asap   to leave a sample and get medication. Please call and advise what to do. Office closed today.  ---------------------------------------------------------------------------  --------------  [address]  What is the best way for the office to contact you? OK to leave message on   voicemail  Preferred Call Back Phone Number? [phone]  ---------------------------------------------------------------------------  --------------  SCRIPT ANSWERS  Relationship to Patient? Self  Appointment reason? Symptomatic  Select script based on patient symptoms? Adult No Script  (Is the patient requesting to see the provider for a procedure?)? No  (Is the patient requesting to see the provider urgently  today or   tomorrow. )? Yes  Have you been diagnosed with   tested for   or told that you are suspected of having COVID-19 (Coronavirus)? No  Have you had a fever or taken medication to treat a fever within the past   3 days? No  Have you had a cough   shortness of breath or flu-like symptoms within the past 3 days? No  Do you currently have flu-like symptoms including fever or chills   cough   shortness of breath   or difficulty breathing   or new loss of taste or smell? No  (Service Expert  click yes below to proceed with [employer]ual   Scheduling)?  Yes

## 2020-11-30 NOTE — TELEPHONE ENCOUNTER
----- Message from Jignesh Greene sent at 11/27/2020 10:42 AM EST -----  Subject: Appointment Request    Reason for Call: Urgent (Patient Request) No Script    QUESTIONS  Type of Appointment? Established Patient  Reason for appointment request? No appointments available during search  Additional Information for Provider? Patient was offered VV on 12/3 but   declined. She thinks she is having UTI symptoms and wants to come in asap   to leave a sample and get medication. Please call and advise what to do. Office closed today.  ---------------------------------------------------------------------------  --------------  [address]  What is the best way for the office to contact you? OK to leave message on   voicemail  Preferred Call Back Phone Number? [phone]  ---------------------------------------------------------------------------  --------------  SCRIPT ANSWERS  Relationship to Patient? Self  Appointment reason? Symptomatic  Select script based on patient symptoms? Adult No Script  (Is the patient requesting to see the provider for a procedure?)? No  (Is the patient requesting to see the provider urgently  today or   tomorrow. )? Yes  Have you been diagnosed with   tested for   or told that you are suspected of having COVID-19 (Coronavirus)? No  Have you had a fever or taken medication to treat a fever within the past   3 days? No  Have you had a cough   shortness of breath or flu-like symptoms within the past 3 days? No  Do you currently have flu-like symptoms including fever or chills   cough   shortness of breath   or difficulty breathing   or new loss of taste or smell? No  (Service Expert  click yes below to proceed with [employer] As Usual   Scheduling)?  Yes

## 2020-12-01 ENCOUNTER — OFFICE VISIT (OUTPATIENT)
Dept: FAMILY MEDICINE CLINIC | Age: 75
End: 2020-12-01
Payer: MEDICARE

## 2020-12-01 VITALS
HEART RATE: 79 BPM | SYSTOLIC BLOOD PRESSURE: 150 MMHG | DIASTOLIC BLOOD PRESSURE: 80 MMHG | TEMPERATURE: 96.8 F | BODY MASS INDEX: 41.56 KG/M2 | WEIGHT: 212.8 LBS | OXYGEN SATURATION: 98 %

## 2020-12-01 LAB
BILIRUBIN, POC: NORMAL
BLOOD URINE, POC: NORMAL
CLARITY, POC: NORMAL
COLOR, POC: NORMAL
GLUCOSE URINE, POC: NORMAL
KETONES, POC: NORMAL
LEUKOCYTE EST, POC: NORMAL
NITRITE, POC: POSITIVE
PH, POC: 6
PROTEIN, POC: NORMAL
SPECIFIC GRAVITY, POC: 1.02
UROBILINOGEN, POC: 0.2

## 2020-12-01 PROCEDURE — G8417 CALC BMI ABV UP PARAM F/U: HCPCS | Performed by: NURSE PRACTITIONER

## 2020-12-01 PROCEDURE — 1123F ACP DISCUSS/DSCN MKR DOCD: CPT | Performed by: NURSE PRACTITIONER

## 2020-12-01 PROCEDURE — 81002 URINALYSIS NONAUTO W/O SCOPE: CPT | Performed by: NURSE PRACTITIONER

## 2020-12-01 PROCEDURE — G8484 FLU IMMUNIZE NO ADMIN: HCPCS | Performed by: NURSE PRACTITIONER

## 2020-12-01 PROCEDURE — 99213 OFFICE O/P EST LOW 20 MIN: CPT | Performed by: NURSE PRACTITIONER

## 2020-12-01 PROCEDURE — 4040F PNEUMOC VAC/ADMIN/RCVD: CPT | Performed by: NURSE PRACTITIONER

## 2020-12-01 PROCEDURE — 1036F TOBACCO NON-USER: CPT | Performed by: NURSE PRACTITIONER

## 2020-12-01 PROCEDURE — 1090F PRES/ABSN URINE INCON ASSESS: CPT | Performed by: NURSE PRACTITIONER

## 2020-12-01 PROCEDURE — G8427 DOCREV CUR MEDS BY ELIG CLIN: HCPCS | Performed by: NURSE PRACTITIONER

## 2020-12-01 PROCEDURE — G8399 PT W/DXA RESULTS DOCUMENT: HCPCS | Performed by: NURSE PRACTITIONER

## 2020-12-01 RX ORDER — LORAZEPAM 0.5 MG/1
0.5 TABLET ORAL EVERY 6 HOURS PRN
COMMUNITY
End: 2020-12-03

## 2020-12-01 RX ORDER — QUETIAPINE FUMARATE 50 MG/1
50 TABLET, EXTENDED RELEASE ORAL NIGHTLY
COMMUNITY
End: 2020-12-01 | Stop reason: SDUPTHER

## 2020-12-01 RX ORDER — LORAZEPAM 1 MG/1
1 TABLET ORAL EVERY 6 HOURS PRN
COMMUNITY
End: 2021-03-11 | Stop reason: SDUPTHER

## 2020-12-01 RX ORDER — SULFAMETHOXAZOLE AND TRIMETHOPRIM 800; 160 MG/1; MG/1
1 TABLET ORAL 2 TIMES DAILY
Qty: 14 TABLET | Refills: 0 | Status: SHIPPED | OUTPATIENT
Start: 2020-12-01 | End: 2020-12-08

## 2020-12-01 RX ORDER — QUETIAPINE FUMARATE 25 MG/1
TABLET, FILM COATED ORAL
Status: ON HOLD | COMMUNITY
Start: 2020-11-27 | End: 2021-11-05 | Stop reason: HOSPADM

## 2020-12-01 ASSESSMENT — ENCOUNTER SYMPTOMS
GASTROINTESTINAL NEGATIVE: 1
SHORTNESS OF BREATH: 0
RESPIRATORY NEGATIVE: 1
COUGH: 0
WHEEZING: 0

## 2020-12-01 NOTE — PROGRESS NOTES
2020     Raman Kirkland (:  1945) is a 67 y.o. female, here for evaluation of the following medical concerns:    HPI   Patient presents today with her  with concerns of a possible UTI. Her psychiatrist thinks it may be a trigger for her depression/anxiety worsening. She denies any burning or discharge. She does not thinks she is urinating more frequently. Review of Systems   Constitutional: Negative. HENT: Negative. Respiratory: Negative. Negative for cough, shortness of breath and wheezing. Cardiovascular: Negative. Negative for chest pain and palpitations. Gastrointestinal: Negative. Genitourinary: Negative for dysuria, flank pain, frequency and urgency. Mild itching   Musculoskeletal: Negative. Skin: Negative. Psychiatric/Behavioral: Positive for confusion. The patient is nervous/anxious. Prior to Visit Medications    Medication Sig Taking? Authorizing Provider   LORazepam (ATIVAN) 0.5 MG tablet Take 0.5 mg by mouth every 6 hours as needed for Anxiety. Yes Historical Provider, MD   LORazepam (ATIVAN) 1 MG tablet Take 1 mg by mouth every 6 hours as needed for Anxiety.  Yes Historical Provider, MD   sulfamethoxazole-trimethoprim (BACTRIM DS;SEPTRA DS) 800-160 MG per tablet Take 1 tablet by mouth 2 times daily for 7 days Yes SHEY Judd CNP   atorvastatin (LIPITOR) 20 MG tablet TAKE ONE TABLET BY MOUTH DAILY Yes SHEY Joyce CNP   losartan-hydroCHLOROthiazide (HYZAAR) 100-25 MG per tablet TAKE ONE TABLET BY MOUTH DAILY Yes SHEY Joyce CNP   amLODIPine (NORVASC) 5 MG tablet TAKE ONE TABLET BY MOUTH DAILY Yes SHEY Joyce CNP   sertraline (ZOLOFT) 100 MG tablet Take 1.5 tablets by mouth nightly Yes Gio Munguia MD   metFORMIN (GLUCOPHAGE) 500 MG tablet TAKE ONE TABLET BY MOUTH TWICE A DAY WITH MEALS Yes Gio Munguia MD   metoprolol succinate (TOPROL XL) 100 MG extended release tablet TAKE ONE Refill: 0  - Culture, Urine    2. Anxiety  Continue medications per Dr. Betty Altman. An electronic signature was used to authenticate this note.     --SHEY Vital - CNP on 12/1/2020 at 4:51 PM

## 2020-12-02 ENCOUNTER — TELEPHONE (OUTPATIENT)
Dept: FAMILY MEDICINE CLINIC | Age: 75
End: 2020-12-02

## 2020-12-03 RX ORDER — LORAZEPAM 0.5 MG/1
TABLET ORAL
Qty: 60 TABLET | Refills: 0 | Status: SHIPPED | OUTPATIENT
Start: 2020-12-03 | End: 2021-01-25

## 2020-12-03 NOTE — TELEPHONE ENCOUNTER
Pt states Dr. France Lakhani will not be prescribing but pt wanted to get Dr. Noe Fine support and approval to take along with other medication.

## 2020-12-03 NOTE — TELEPHONE ENCOUNTER
Patient is seeing psychiatry. They are okay with her being on the ativan with her other medications. I will send the refill of the 0.5. the 1 mg was maker her too drowsy.

## 2020-12-04 LAB
ORGANISM: ABNORMAL
URINE CULTURE, ROUTINE: ABNORMAL

## 2020-12-09 ENCOUNTER — TELEPHONE (OUTPATIENT)
Dept: FAMILY MEDICINE CLINIC | Age: 75
End: 2020-12-09

## 2020-12-09 NOTE — TELEPHONE ENCOUNTER
Augie Wilcox is requesting a rx for   metoprolol succinate (TOPROL XL) 100 MG extended release tablet, #90.  Last filled 11/4/2020        Last acute 12/1/20  Next office visit   12/11/2020

## 2020-12-10 ENCOUNTER — TELEPHONE (OUTPATIENT)
Dept: FAMILY MEDICINE CLINIC | Age: 75
End: 2020-12-10

## 2020-12-10 RX ORDER — METOPROLOL SUCCINATE 100 MG/1
TABLET, EXTENDED RELEASE ORAL
Qty: 90 TABLET | Refills: 1 | Status: SHIPPED | OUTPATIENT
Start: 2020-12-10 | End: 2021-06-08

## 2020-12-10 NOTE — TELEPHONE ENCOUNTER
Adriana is requesting a rx   metFORMIN (GLUCOPHAGE) 500 MG tablet    Last filled 11/13/20    Last acute 12/1/20  Next office visit   12/11/2020

## 2020-12-11 ENCOUNTER — OFFICE VISIT (OUTPATIENT)
Dept: FAMILY MEDICINE CLINIC | Age: 75
End: 2020-12-11
Payer: MEDICARE

## 2020-12-11 VITALS
HEART RATE: 83 BPM | SYSTOLIC BLOOD PRESSURE: 128 MMHG | BODY MASS INDEX: 40.62 KG/M2 | TEMPERATURE: 96.8 F | DIASTOLIC BLOOD PRESSURE: 80 MMHG | WEIGHT: 208 LBS | OXYGEN SATURATION: 94 %

## 2020-12-11 PROCEDURE — G8417 CALC BMI ABV UP PARAM F/U: HCPCS | Performed by: NURSE PRACTITIONER

## 2020-12-11 PROCEDURE — 1090F PRES/ABSN URINE INCON ASSESS: CPT | Performed by: NURSE PRACTITIONER

## 2020-12-11 PROCEDURE — 99213 OFFICE O/P EST LOW 20 MIN: CPT | Performed by: NURSE PRACTITIONER

## 2020-12-11 PROCEDURE — G8427 DOCREV CUR MEDS BY ELIG CLIN: HCPCS | Performed by: NURSE PRACTITIONER

## 2020-12-11 PROCEDURE — G8399 PT W/DXA RESULTS DOCUMENT: HCPCS | Performed by: NURSE PRACTITIONER

## 2020-12-11 PROCEDURE — 1036F TOBACCO NON-USER: CPT | Performed by: NURSE PRACTITIONER

## 2020-12-11 PROCEDURE — 4040F PNEUMOC VAC/ADMIN/RCVD: CPT | Performed by: NURSE PRACTITIONER

## 2020-12-11 PROCEDURE — 1123F ACP DISCUSS/DSCN MKR DOCD: CPT | Performed by: NURSE PRACTITIONER

## 2020-12-11 PROCEDURE — G8484 FLU IMMUNIZE NO ADMIN: HCPCS | Performed by: NURSE PRACTITIONER

## 2020-12-11 ASSESSMENT — ENCOUNTER SYMPTOMS
RESPIRATORY NEGATIVE: 1
GASTROINTESTINAL NEGATIVE: 1

## 2020-12-11 NOTE — PROGRESS NOTES
2020     Keith Magana (:  1945) is a 67 y.o. female, here for evaluation of the following medical concerns:    HPI  Patient presents today to follow up on her anxiety. She states she is feeling better after completing the antibiotics. Her  reports improvement. She is not needing the ativan as often. Review of Systems   Constitutional: Negative. HENT: Negative. Respiratory: Negative. Cardiovascular: Negative. Gastrointestinal: Negative. Musculoskeletal: Negative. Skin: Negative. Neurological: Negative. Psychiatric/Behavioral: The patient is nervous/anxious. Prior to Visit Medications    Medication Sig Taking?  Authorizing Provider   metoprolol succinate (TOPROL XL) 100 MG extended release tablet TAKE ONE TABLET BY MOUTH DAILY - TAKE WITH 25MG DOSE FOR A TOTAL OF 125MG DAILY Yes SHEY Small CNP   metFORMIN (GLUCOPHAGE) 500 MG tablet TAKE ONE TABLET BY MOUTH TWICE A DAY WITH MEALS Yes SHEY Joyce CNP   LORazepam (ATIVAN) 0.5 MG tablet TAKE ONE TABLET BY MOUTH TWICE A DAY AS NEEDED FOR ANXIETY FOR UP TO 30 DAYS Yes SHEY Joyce CNP   QUEtiapine (SEROQUEL) 25 MG tablet  Yes Historical Provider, MD   atorvastatin (LIPITOR) 20 MG tablet TAKE ONE TABLET BY MOUTH DAILY Yes SHEY Joyce CNP   losartan-hydroCHLOROthiazide (HYZAAR) 100-25 MG per tablet TAKE ONE TABLET BY MOUTH DAILY Yes SHEY Joyce CNP   amLODIPine (NORVASC) 5 MG tablet TAKE ONE TABLET BY MOUTH DAILY Yes SHEY Joyce CNP   sertraline (ZOLOFT) 100 MG tablet Take 1.5 tablets by mouth nightly Yes Andrew Juarez MD   metoprolol succinate (TOPROL XL) 25 MG extended release tablet TAKE ONE TABLET BY MOUTH DAILY Yes Andrew Juarez MD   aspirin 81 MG tablet Take 1 tablet by mouth daily Yes Andrew Juarez MD   latanoprost (XALATAN) 0.005 % ophthalmic solution Place 1 drop into the right eye nightly Yes Andrew Juarez MD timolol (TIMOPTIC) 0.5 % ophthalmic solution Place 1 drop into both eyes 2 times daily Yes Margie Vincent MD   Multiple Vitamins-Minerals (MULTIVITAL PO) Take  by mouth. Yes Historical Provider, MD   LORazepam (ATIVAN) 1 MG tablet Take 1 mg by mouth every 6 hours as needed for Anxiety. Historical Provider, MD        Social History     Tobacco Use    Smoking status: Former Smoker     Packs/day: 0.25     Years: 1.00     Pack years: 0.25     Types: Cigarettes     Last attempt to quit: 1968     Years since quittin.9    Smokeless tobacco: Never Used   Substance Use Topics    Alcohol use: No     Alcohol/week: 0.0 standard drinks        Vitals:    20 1355   BP: 128/80   Pulse: 83   Temp: 96.8 °F (36 °C)   TempSrc: Temporal   SpO2: 94%   Weight: 208 lb (94.3 kg)     Estimated body mass index is 40.62 kg/m² as calculated from the following:    Height as of 20: 5' (1.524 m). Weight as of this encounter: 208 lb (94.3 kg). Physical Exam  Vitals signs reviewed. Cardiovascular:      Rate and Rhythm: Normal rate and regular rhythm. Heart sounds: Normal heart sounds. Pulmonary:      Effort: Pulmonary effort is normal.      Breath sounds: Normal breath sounds. Skin:     General: Skin is warm and dry. Neurological:      Mental Status: She is alert and oriented to person, place, and time. Psychiatric:         Mood and Affect: Mood normal.         Behavior: Behavior normal.         ASSESSMENT/PLAN:  1. Anxiety  Much improved in her mood today. Continue with ativan as needed. Continue with follow up with psychiatry. An electronic signature was used to authenticate this note.     --SHEY Mae - CNP on 2020 at 2:27 PM

## 2021-03-11 ENCOUNTER — OFFICE VISIT (OUTPATIENT)
Dept: FAMILY MEDICINE CLINIC | Age: 76
End: 2021-03-11
Payer: MEDICARE

## 2021-03-11 VITALS
SYSTOLIC BLOOD PRESSURE: 138 MMHG | OXYGEN SATURATION: 95 % | TEMPERATURE: 97.3 F | DIASTOLIC BLOOD PRESSURE: 78 MMHG | WEIGHT: 199.6 LBS | BODY MASS INDEX: 38.98 KG/M2 | HEART RATE: 63 BPM

## 2021-03-11 DIAGNOSIS — F41.9 ANXIETY: Primary | ICD-10-CM

## 2021-03-11 DIAGNOSIS — F33.1 MAJOR DEPRESSIVE DISORDER, RECURRENT, MODERATE (HCC): ICD-10-CM

## 2021-03-11 DIAGNOSIS — R73.03 PREDIABETES: ICD-10-CM

## 2021-03-11 PROCEDURE — G8417 CALC BMI ABV UP PARAM F/U: HCPCS | Performed by: NURSE PRACTITIONER

## 2021-03-11 PROCEDURE — G8399 PT W/DXA RESULTS DOCUMENT: HCPCS | Performed by: NURSE PRACTITIONER

## 2021-03-11 PROCEDURE — 1036F TOBACCO NON-USER: CPT | Performed by: NURSE PRACTITIONER

## 2021-03-11 PROCEDURE — 1090F PRES/ABSN URINE INCON ASSESS: CPT | Performed by: NURSE PRACTITIONER

## 2021-03-11 PROCEDURE — G8484 FLU IMMUNIZE NO ADMIN: HCPCS | Performed by: NURSE PRACTITIONER

## 2021-03-11 PROCEDURE — 1123F ACP DISCUSS/DSCN MKR DOCD: CPT | Performed by: NURSE PRACTITIONER

## 2021-03-11 PROCEDURE — G8427 DOCREV CUR MEDS BY ELIG CLIN: HCPCS | Performed by: NURSE PRACTITIONER

## 2021-03-11 PROCEDURE — 4040F PNEUMOC VAC/ADMIN/RCVD: CPT | Performed by: NURSE PRACTITIONER

## 2021-03-11 PROCEDURE — 99214 OFFICE O/P EST MOD 30 MIN: CPT | Performed by: NURSE PRACTITIONER

## 2021-03-11 RX ORDER — LORAZEPAM 0.5 MG/1
0.5 TABLET ORAL DAILY PRN
COMMUNITY
End: 2021-11-12 | Stop reason: SDUPTHER

## 2021-03-11 ASSESSMENT — PATIENT HEALTH QUESTIONNAIRE - PHQ9
1. LITTLE INTEREST OR PLEASURE IN DOING THINGS: 0
SUM OF ALL RESPONSES TO PHQ QUESTIONS 1-9: 0
2. FEELING DOWN, DEPRESSED OR HOPELESS: 0

## 2021-03-11 NOTE — PROGRESS NOTES
Patient: Gissel Craven is a 67 y.o. female who presents today with the following Chief Complaint(s):  Chief Complaint   Patient presents with    3 Month Follow-Up     anxiety          HPI   Patient presents for a 3 month follow up on anxiety. She states she is doing much better. She feels more stable with  Her anxiety. She is still seeing psychiatry as well. They have her on seroquel nightly and she takes ativan 0.5 mg daily as needed. Her and her  states   Takes ativan sometimes once day 0.5mg. Current Outpatient Medications   Medication Sig Dispense Refill    LORazepam (ATIVAN) 0.5 MG tablet Take 0.5 mg by mouth daily as needed for Anxiety.  metoprolol succinate (TOPROL XL) 100 MG extended release tablet TAKE ONE TABLET BY MOUTH DAILY - TAKE WITH 25MG DOSE FOR A TOTAL OF 125MG DAILY 90 tablet 1    metFORMIN (GLUCOPHAGE) 500 MG tablet TAKE ONE TABLET BY MOUTH TWICE A DAY WITH MEALS 180 tablet 1    QUEtiapine (SEROQUEL) 25 MG tablet       atorvastatin (LIPITOR) 20 MG tablet TAKE ONE TABLET BY MOUTH DAILY 90 tablet 1    losartan-hydroCHLOROthiazide (HYZAAR) 100-25 MG per tablet TAKE ONE TABLET BY MOUTH DAILY 90 tablet 1    amLODIPine (NORVASC) 5 MG tablet TAKE ONE TABLET BY MOUTH DAILY 90 tablet 1    sertraline (ZOLOFT) 100 MG tablet Take 1.5 tablets by mouth nightly 135 tablet 1    metoprolol succinate (TOPROL XL) 25 MG extended release tablet TAKE ONE TABLET BY MOUTH DAILY 90 tablet 1    aspirin 81 MG tablet Take 1 tablet by mouth daily 90 tablet 1    latanoprost (XALATAN) 0.005 % ophthalmic solution Place 1 drop into the right eye nightly (Patient taking differently: Place 1 drop into both eyes nightly ) 3 Bottle 0    Multiple Vitamins-Minerals (MULTIVITAL PO) Take  by mouth.  dorzolamide-timolol 22.3-6.8 MG/ML SOLN Apply 1 drop to eye 2 times daily       No current facility-administered medications for this visit.         Patient's past medical history, surgical history, family history, medications,and allergies  were all reviewed and updated as appropriate today. Review of Systems   Constitutional: Negative. HENT: Negative. Respiratory: Negative. Cardiovascular: Negative. Gastrointestinal: Negative. Musculoskeletal: Negative. Skin: Negative. Neurological: Negative. Psychiatric/Behavioral: Negative. Physical Exam  Vitals signs reviewed. Cardiovascular:      Rate and Rhythm: Normal rate and regular rhythm. Heart sounds: Normal heart sounds. Pulmonary:      Effort: Pulmonary effort is normal.      Breath sounds: Normal breath sounds. Skin:     General: Skin is warm and dry. Neurological:      Mental Status: She is alert and oriented to person, place, and time. Psychiatric:         Mood and Affect: Mood normal.         Behavior: Behavior normal.       Vitals:    03/11/21 1315   BP: 138/78   Pulse:    Temp:    SpO2:        Assessment:  Encounter Diagnosis   Name Primary?  Anxiety Yes       Plan:  1. Anxiety  Continue ativan as needed daily. Will follow up in November for annual wellness. Will try to keep on every 6 month follow up. 2. Major depressive disorder  Continue to follow up with Psychiatry as needed  3. Body mass index BMI 40-44.9 in adult  Increase exercise. 4- prediabetes  Last A1C was stable at 6.3. continue diet modifications.

## 2021-03-12 PROBLEM — E11.9 TYPE 2 DIABETES MELLITUS WITHOUT COMPLICATION, WITHOUT LONG-TERM CURRENT USE OF INSULIN (HCC): Status: RESOLVED | Noted: 2021-03-12 | Resolved: 2021-03-12

## 2021-03-12 PROBLEM — E11.9 TYPE 2 DIABETES MELLITUS WITHOUT COMPLICATION, WITHOUT LONG-TERM CURRENT USE OF INSULIN (HCC): Status: ACTIVE | Noted: 2021-03-12

## 2021-03-12 PROBLEM — F33.1 MAJOR DEPRESSIVE DISORDER, RECURRENT, MODERATE (HCC): Status: ACTIVE | Noted: 2021-03-12

## 2021-03-12 ASSESSMENT — ENCOUNTER SYMPTOMS
RESPIRATORY NEGATIVE: 1
GASTROINTESTINAL NEGATIVE: 1

## 2021-03-22 ENCOUNTER — TELEPHONE (OUTPATIENT)
Dept: FAMILY MEDICINE CLINIC | Age: 76
End: 2021-03-22

## 2021-03-22 DIAGNOSIS — I10 ESSENTIAL HYPERTENSION: ICD-10-CM

## 2021-03-22 RX ORDER — METOPROLOL SUCCINATE 25 MG/1
TABLET, EXTENDED RELEASE ORAL
Qty: 90 TABLET | Refills: 1 | Status: SHIPPED | OUTPATIENT
Start: 2021-03-22 | End: 2021-09-21

## 2021-05-27 DIAGNOSIS — E78.49 OTHER HYPERLIPIDEMIA: ICD-10-CM

## 2021-05-28 RX ORDER — ATORVASTATIN CALCIUM 20 MG/1
TABLET, FILM COATED ORAL
Qty: 30 TABLET | Refills: 0 | Status: SHIPPED | OUTPATIENT
Start: 2021-05-28 | End: 2021-07-01

## 2021-06-08 LAB — DIABETIC RETINOPATHY: NEGATIVE

## 2021-06-17 DIAGNOSIS — R73.03 PREDIABETES: ICD-10-CM

## 2021-10-05 DIAGNOSIS — I10 ESSENTIAL HYPERTENSION: ICD-10-CM

## 2021-10-05 RX ORDER — METOPROLOL SUCCINATE 100 MG/1
TABLET, EXTENDED RELEASE ORAL
Qty: 30 TABLET | Refills: 3 | Status: SHIPPED | OUTPATIENT
Start: 2021-10-05 | End: 2022-02-01 | Stop reason: SDUPTHER

## 2021-10-29 ENCOUNTER — TELEPHONE (OUTPATIENT)
Dept: FAMILY MEDICINE CLINIC | Age: 76
End: 2021-10-29

## 2021-10-29 ENCOUNTER — NURSE TRIAGE (OUTPATIENT)
Dept: OTHER | Facility: CLINIC | Age: 76
End: 2021-10-29

## 2021-10-29 NOTE — TELEPHONE ENCOUNTER
Received call from [facility] at [facility] [place] with Red Flag Complaint. Brief description of triage:   Back pain with frequent urination, was trembling this morning and incoherent, spoke to Dr. Pratik Liriano who told the patient that she needed to call her PCP for a UTI test    Triage indicates for patient to: No triage indicated at this time as patient spoke with a doctor     Care advice provided, patient verbalizes understanding; denies any other questions or concerns; instructed to call back for any new or worsening symptoms. Writer provided warm transfer to [facility] [place] for appointment scheduling. Attention Provider: Thank you for allowing me to participate in the care of your patient. The patient was connected to triage in response to information provided to the ECC/PSC. Please do not respond through this encounter as the response is not directed to a shared pool. Reason for Disposition   Requesting regular office appointment    Answer Assessment - Initial Assessment Questions  1. REASON FOR CALL or QUESTION: \"What is your reason for calling today? \" or \"How can I best help you? \" or \"What question do you have that I can help answer? \"      See above note    Protocols used: INFORMATION ONLY CALL - NO TRIAGE-ADULT-

## 2021-10-29 NOTE — TELEPHONE ENCOUNTER
Vignesh Duff [facility]         10/29/21 1:51 PM  Note     Beto Norris has been informed to  urine sample container and will return with sample. Vignesh Duff [facility]         10/29/21 2:36 PM  Note     Urine sample picked up       This encounter is not signed. The conversation may still be ongoing.

## 2021-11-01 ENCOUNTER — NURSE ONLY (OUTPATIENT)
Dept: FAMILY MEDICINE CLINIC | Age: 76
End: 2021-11-01
Payer: MEDICARE

## 2021-11-01 DIAGNOSIS — R30.0 DYSURIA: Primary | ICD-10-CM

## 2021-11-01 DIAGNOSIS — N39.0 URINARY TRACT INFECTION WITH HEMATURIA, SITE UNSPECIFIED: Primary | ICD-10-CM

## 2021-11-01 DIAGNOSIS — R31.9 URINARY TRACT INFECTION WITH HEMATURIA, SITE UNSPECIFIED: Primary | ICD-10-CM

## 2021-11-01 LAB
BILIRUBIN, POC: ABNORMAL
BLOOD URINE, POC: ABNORMAL
CLARITY, POC: CLEAR
COLOR, POC: YELLOW
GLUCOSE URINE, POC: ABNORMAL
KETONES, POC: ABNORMAL
LEUKOCYTE EST, POC: ABNORMAL
NITRITE, POC: ABNORMAL
PH, POC: 5.5
PROTEIN, POC: ABNORMAL
SPECIFIC GRAVITY, POC: 1.02
UROBILINOGEN, POC: ABNORMAL

## 2021-11-01 PROCEDURE — 81002 URINALYSIS NONAUTO W/O SCOPE: CPT | Performed by: NURSE PRACTITIONER

## 2021-11-01 RX ORDER — SULFAMETHOXAZOLE AND TRIMETHOPRIM 800; 160 MG/1; MG/1
1 TABLET ORAL 2 TIMES DAILY
Qty: 10 TABLET | Refills: 0 | Status: SHIPPED | OUTPATIENT
Start: 2021-11-01 | End: 2021-11-06

## 2021-11-02 LAB — URINE CULTURE, ROUTINE: NORMAL

## 2021-11-03 ENCOUNTER — TELEPHONE (OUTPATIENT)
Dept: FAMILY MEDICINE CLINIC | Age: 76
End: 2021-11-03

## 2021-11-03 NOTE — TELEPHONE ENCOUNTER
Pt's  called in about Bactrim. Pt woke up with morning w/ leg spasms, headache, tightness in chest, upset stomach, heart feels like it racing. Would like to know if this is due to the antibiotic and if she should start something else? Please advise.

## 2021-11-03 NOTE — TELEPHONE ENCOUNTER
Pt  was informed per EG. He stated that is going to be hard and will take your advise into consideration.

## 2021-11-03 NOTE — TELEPHONE ENCOUNTER
Her urine culture was negative so there is no need for the antibiotic now. Since she is having chest tightness and feeling heart palpitations I think it would be best that she was seen at the ER. The antibiotic could cause nausea and headache but the chest tightness and palpitations I am concerned about would like her evaluated for.

## 2021-11-04 ENCOUNTER — APPOINTMENT (OUTPATIENT)
Dept: GENERAL RADIOLOGY | Age: 76
End: 2021-11-04
Payer: MEDICARE

## 2021-11-04 ENCOUNTER — HOSPITAL ENCOUNTER (OUTPATIENT)
Age: 76
Setting detail: OBSERVATION
Discharge: HOME | End: 2021-11-05
Attending: EMERGENCY MEDICINE | Admitting: INTERNAL MEDICINE
Payer: MEDICARE

## 2021-11-04 DIAGNOSIS — E87.6 HYPOKALEMIA: Primary | ICD-10-CM

## 2021-11-04 DIAGNOSIS — F41.1 ANXIETY STATE: ICD-10-CM

## 2021-11-04 DIAGNOSIS — R94.31 ACUTE ELECTROCARDIOGRAM CHANGES: ICD-10-CM

## 2021-11-04 DIAGNOSIS — E86.0 DEHYDRATION: ICD-10-CM

## 2021-11-04 DIAGNOSIS — E83.42 HYPOMAGNESEMIA: ICD-10-CM

## 2021-11-04 PROBLEM — F41.0 PANIC ATTACK: Status: ACTIVE | Noted: 2021-11-04

## 2021-11-04 LAB
A/G RATIO: 1.1 (ref 1.1–2.2)
ALBUMIN SERPL-MCNC: 3.3 G/DL (ref 3.4–5)
ALP BLD-CCNC: 55 U/L (ref 40–129)
ALT SERPL-CCNC: 13 U/L (ref 10–40)
AMPHETAMINE SCREEN, URINE: NORMAL
ANION GAP SERPL CALCULATED.3IONS-SCNC: 17 MMOL/L (ref 3–16)
AST SERPL-CCNC: 19 U/L (ref 15–37)
BARBITURATE SCREEN URINE: NORMAL
BASOPHILS ABSOLUTE: 0 K/UL (ref 0–0.2)
BASOPHILS RELATIVE PERCENT: 0.3 %
BENZODIAZEPINE SCREEN, URINE: NORMAL
BILIRUB SERPL-MCNC: 0.6 MG/DL (ref 0–1)
BUN BLDV-MCNC: 20 MG/DL (ref 7–20)
CALCIUM SERPL-MCNC: 8.9 MG/DL (ref 8.3–10.6)
CANNABINOID SCREEN URINE: NORMAL
CHLORIDE BLD-SCNC: 105 MMOL/L (ref 99–110)
CO2: 16 MMOL/L (ref 21–32)
COCAINE METABOLITE SCREEN URINE: NORMAL
CREAT SERPL-MCNC: 0.8 MG/DL (ref 0.6–1.2)
EOSINOPHILS ABSOLUTE: 0.1 K/UL (ref 0–0.6)
EOSINOPHILS RELATIVE PERCENT: 2.3 %
ETHANOL: NORMAL MG/DL (ref 0–0.08)
GFR AFRICAN AMERICAN: >60
GFR NON-AFRICAN AMERICAN: >60
GLUCOSE BLD-MCNC: 123 MG/DL (ref 70–99)
GLUCOSE BLD-MCNC: 160 MG/DL (ref 70–99)
HCT VFR BLD CALC: 42.6 % (ref 36–48)
HEMOGLOBIN: 14.4 G/DL (ref 12–16)
LYMPHOCYTES ABSOLUTE: 0.8 K/UL (ref 1–5.1)
LYMPHOCYTES RELATIVE PERCENT: 12.7 %
Lab: NORMAL
MAGNESIUM: 1.3 MG/DL (ref 1.8–2.4)
MCH RBC QN AUTO: 31 PG (ref 26–34)
MCHC RBC AUTO-ENTMCNC: 33.8 G/DL (ref 31–36)
MCV RBC AUTO: 91.7 FL (ref 80–100)
METHADONE SCREEN, URINE: NORMAL
MONOCYTES ABSOLUTE: 0.4 K/UL (ref 0–1.3)
MONOCYTES RELATIVE PERCENT: 6.4 %
NEUTROPHILS ABSOLUTE: 4.9 K/UL (ref 1.7–7.7)
NEUTROPHILS RELATIVE PERCENT: 78.3 %
OPIATE SCREEN URINE: NORMAL
OXYCODONE URINE: NORMAL
PDW BLD-RTO: 13.2 % (ref 12.4–15.4)
PERFORMED ON: ABNORMAL
PH UA: 6
PHENCYCLIDINE SCREEN URINE: NORMAL
PLATELET # BLD: 216 K/UL (ref 135–450)
PMV BLD AUTO: 8.3 FL (ref 5–10.5)
POTASSIUM REFLEX MAGNESIUM: 3.1 MMOL/L (ref 3.5–5.1)
PROPOXYPHENE SCREEN: NORMAL
RBC # BLD: 4.64 M/UL (ref 4–5.2)
SODIUM BLD-SCNC: 138 MMOL/L (ref 136–145)
TOTAL PROTEIN: 6.2 G/DL (ref 6.4–8.2)
TROPONIN: <0.01 NG/ML
TROPONIN: <0.01 NG/ML
WBC # BLD: 6.3 K/UL (ref 4–11)

## 2021-11-04 PROCEDURE — 85025 COMPLETE CBC W/AUTO DIFF WBC: CPT

## 2021-11-04 PROCEDURE — 2580000003 HC RX 258: Performed by: NURSE PRACTITIONER

## 2021-11-04 PROCEDURE — 6370000000 HC RX 637 (ALT 250 FOR IP): Performed by: NURSE PRACTITIONER

## 2021-11-04 PROCEDURE — 71046 X-RAY EXAM CHEST 2 VIEWS: CPT

## 2021-11-04 PROCEDURE — 96365 THER/PROPH/DIAG IV INF INIT: CPT

## 2021-11-04 PROCEDURE — 6370000000 HC RX 637 (ALT 250 FOR IP): Performed by: PSYCHIATRY & NEUROLOGY

## 2021-11-04 PROCEDURE — 6360000002 HC RX W HCPCS: Performed by: NURSE PRACTITIONER

## 2021-11-04 PROCEDURE — 93005 ELECTROCARDIOGRAM TRACING: CPT | Performed by: NURSE PRACTITIONER

## 2021-11-04 PROCEDURE — G0378 HOSPITAL OBSERVATION PER HR: HCPCS

## 2021-11-04 PROCEDURE — 99284 EMERGENCY DEPT VISIT MOD MDM: CPT

## 2021-11-04 PROCEDURE — 96374 THER/PROPH/DIAG INJ IV PUSH: CPT

## 2021-11-04 PROCEDURE — 82077 ASSAY SPEC XCP UR&BREATH IA: CPT

## 2021-11-04 PROCEDURE — 99205 OFFICE O/P NEW HI 60 MIN: CPT | Performed by: PSYCHIATRY & NEUROLOGY

## 2021-11-04 PROCEDURE — 80307 DRUG TEST PRSMV CHEM ANLYZR: CPT

## 2021-11-04 PROCEDURE — 80053 COMPREHEN METABOLIC PANEL: CPT

## 2021-11-04 PROCEDURE — 83036 HEMOGLOBIN GLYCOSYLATED A1C: CPT

## 2021-11-04 PROCEDURE — 96366 THER/PROPH/DIAG IV INF ADDON: CPT

## 2021-11-04 PROCEDURE — 96361 HYDRATE IV INFUSION ADD-ON: CPT

## 2021-11-04 PROCEDURE — 83735 ASSAY OF MAGNESIUM: CPT

## 2021-11-04 PROCEDURE — 96375 TX/PRO/DX INJ NEW DRUG ADDON: CPT

## 2021-11-04 PROCEDURE — 6370000000 HC RX 637 (ALT 250 FOR IP): Performed by: INTERNAL MEDICINE

## 2021-11-04 PROCEDURE — 84484 ASSAY OF TROPONIN QUANT: CPT

## 2021-11-04 PROCEDURE — 36415 COLL VENOUS BLD VENIPUNCTURE: CPT

## 2021-11-04 PROCEDURE — 96368 THER/DIAG CONCURRENT INF: CPT

## 2021-11-04 RX ORDER — ONDANSETRON 4 MG/1
4 TABLET, ORALLY DISINTEGRATING ORAL EVERY 8 HOURS PRN
Status: DISCONTINUED | OUTPATIENT
Start: 2021-11-04 | End: 2021-11-05 | Stop reason: HOSPADM

## 2021-11-04 RX ORDER — AMLODIPINE BESYLATE 5 MG/1
5 TABLET ORAL DAILY
Status: DISCONTINUED | OUTPATIENT
Start: 2021-11-04 | End: 2021-11-05 | Stop reason: HOSPADM

## 2021-11-04 RX ORDER — QUETIAPINE FUMARATE 25 MG/1
50 TABLET, FILM COATED ORAL NIGHTLY
Status: DISCONTINUED | OUTPATIENT
Start: 2021-11-04 | End: 2021-11-05 | Stop reason: HOSPADM

## 2021-11-04 RX ORDER — SULFAMETHOXAZOLE AND TRIMETHOPRIM 800; 160 MG/1; MG/1
1 TABLET ORAL 2 TIMES DAILY
Status: DISCONTINUED | OUTPATIENT
Start: 2021-11-04 | End: 2021-11-05 | Stop reason: HOSPADM

## 2021-11-04 RX ORDER — QUETIAPINE FUMARATE 25 MG/1
25 TABLET, FILM COATED ORAL DAILY
Status: DISCONTINUED | OUTPATIENT
Start: 2021-11-04 | End: 2021-11-05 | Stop reason: HOSPADM

## 2021-11-04 RX ORDER — POTASSIUM CHLORIDE 7.45 MG/ML
10 INJECTION INTRAVENOUS ONCE
Status: COMPLETED | OUTPATIENT
Start: 2021-11-04 | End: 2021-11-04

## 2021-11-04 RX ORDER — HYDROCHLOROTHIAZIDE 25 MG/1
25 TABLET ORAL DAILY
Status: DISCONTINUED | OUTPATIENT
Start: 2021-11-04 | End: 2021-11-05 | Stop reason: HOSPADM

## 2021-11-04 RX ORDER — LATANOPROST 50 UG/ML
1 SOLUTION/ DROPS OPHTHALMIC NIGHTLY
Status: DISCONTINUED | OUTPATIENT
Start: 2021-11-04 | End: 2021-11-05 | Stop reason: HOSPADM

## 2021-11-04 RX ORDER — METOPROLOL SUCCINATE 25 MG/1
25 TABLET, EXTENDED RELEASE ORAL DAILY
Status: DISCONTINUED | OUTPATIENT
Start: 2021-11-04 | End: 2021-11-05 | Stop reason: HOSPADM

## 2021-11-04 RX ORDER — ASPIRIN 81 MG/1
81 TABLET, CHEWABLE ORAL DAILY
Status: DISCONTINUED | OUTPATIENT
Start: 2021-11-04 | End: 2021-11-05 | Stop reason: HOSPADM

## 2021-11-04 RX ORDER — NICOTINE POLACRILEX 4 MG
15 LOZENGE BUCCAL PRN
Status: DISCONTINUED | OUTPATIENT
Start: 2021-11-04 | End: 2021-11-05 | Stop reason: HOSPADM

## 2021-11-04 RX ORDER — SODIUM CHLORIDE 0.9 % (FLUSH) 0.9 %
5-40 SYRINGE (ML) INJECTION PRN
Status: DISCONTINUED | OUTPATIENT
Start: 2021-11-04 | End: 2021-11-05 | Stop reason: HOSPADM

## 2021-11-04 RX ORDER — LOSARTAN POTASSIUM AND HYDROCHLOROTHIAZIDE 25; 100 MG/1; MG/1
1 TABLET ORAL DAILY
Status: DISCONTINUED | OUTPATIENT
Start: 2021-11-04 | End: 2021-11-04 | Stop reason: CLARIF

## 2021-11-04 RX ORDER — QUETIAPINE FUMARATE 25 MG/1
25 TABLET, FILM COATED ORAL NIGHTLY
Status: DISCONTINUED | OUTPATIENT
Start: 2021-11-04 | End: 2021-11-04

## 2021-11-04 RX ORDER — DORZOLAMIDE HYDROCHLORIDE AND TIMOLOL MALEATE 20; 5 MG/ML; MG/ML
1 SOLUTION/ DROPS OPHTHALMIC 2 TIMES DAILY
Status: DISCONTINUED | OUTPATIENT
Start: 2021-11-04 | End: 2021-11-05 | Stop reason: HOSPADM

## 2021-11-04 RX ORDER — LORAZEPAM 0.5 MG/1
0.5 TABLET ORAL DAILY PRN
Status: DISCONTINUED | OUTPATIENT
Start: 2021-11-04 | End: 2021-11-05 | Stop reason: HOSPADM

## 2021-11-04 RX ORDER — METOPROLOL SUCCINATE 25 MG/1
1 TABLET, EXTENDED RELEASE ORAL DAILY
Status: DISCONTINUED | OUTPATIENT
Start: 2021-11-04 | End: 2021-11-04 | Stop reason: SDUPTHER

## 2021-11-04 RX ORDER — LORAZEPAM 2 MG/ML
0.5 INJECTION INTRAMUSCULAR ONCE
Status: COMPLETED | OUTPATIENT
Start: 2021-11-04 | End: 2021-11-04

## 2021-11-04 RX ORDER — ACETAMINOPHEN 325 MG/1
650 TABLET ORAL EVERY 6 HOURS PRN
Status: DISCONTINUED | OUTPATIENT
Start: 2021-11-04 | End: 2021-11-05 | Stop reason: HOSPADM

## 2021-11-04 RX ORDER — MAGNESIUM SULFATE IN WATER 40 MG/ML
2000 INJECTION, SOLUTION INTRAVENOUS ONCE
Status: COMPLETED | OUTPATIENT
Start: 2021-11-04 | End: 2021-11-04

## 2021-11-04 RX ORDER — ONDANSETRON 2 MG/ML
4 INJECTION INTRAMUSCULAR; INTRAVENOUS EVERY 6 HOURS PRN
Status: DISCONTINUED | OUTPATIENT
Start: 2021-11-04 | End: 2021-11-05 | Stop reason: HOSPADM

## 2021-11-04 RX ORDER — 0.9 % SODIUM CHLORIDE 0.9 %
1000 INTRAVENOUS SOLUTION INTRAVENOUS ONCE
Status: COMPLETED | OUTPATIENT
Start: 2021-11-04 | End: 2021-11-04

## 2021-11-04 RX ORDER — DEXTROSE MONOHYDRATE 50 MG/ML
100 INJECTION, SOLUTION INTRAVENOUS PRN
Status: DISCONTINUED | OUTPATIENT
Start: 2021-11-04 | End: 2021-11-05 | Stop reason: HOSPADM

## 2021-11-04 RX ORDER — ATORVASTATIN CALCIUM 10 MG/1
20 TABLET, FILM COATED ORAL DAILY
Status: DISCONTINUED | OUTPATIENT
Start: 2021-11-04 | End: 2021-11-05 | Stop reason: HOSPADM

## 2021-11-04 RX ORDER — LORAZEPAM 2 MG/ML
2 INJECTION INTRAMUSCULAR EVERY 6 HOURS PRN
Status: DISCONTINUED | OUTPATIENT
Start: 2021-11-04 | End: 2021-11-05 | Stop reason: HOSPADM

## 2021-11-04 RX ORDER — DEXTROSE MONOHYDRATE 25 G/50ML
12.5 INJECTION, SOLUTION INTRAVENOUS PRN
Status: DISCONTINUED | OUTPATIENT
Start: 2021-11-04 | End: 2021-11-05 | Stop reason: HOSPADM

## 2021-11-04 RX ORDER — SODIUM CHLORIDE 9 MG/ML
25 INJECTION, SOLUTION INTRAVENOUS PRN
Status: DISCONTINUED | OUTPATIENT
Start: 2021-11-04 | End: 2021-11-05 | Stop reason: HOSPADM

## 2021-11-04 RX ORDER — POLYETHYLENE GLYCOL 3350 17 G/17G
17 POWDER, FOR SOLUTION ORAL DAILY PRN
Status: DISCONTINUED | OUTPATIENT
Start: 2021-11-04 | End: 2021-11-05 | Stop reason: HOSPADM

## 2021-11-04 RX ORDER — SODIUM CHLORIDE 0.9 % (FLUSH) 0.9 %
5-40 SYRINGE (ML) INJECTION EVERY 12 HOURS SCHEDULED
Status: DISCONTINUED | OUTPATIENT
Start: 2021-11-04 | End: 2021-11-05 | Stop reason: HOSPADM

## 2021-11-04 RX ORDER — LOSARTAN POTASSIUM 100 MG/1
100 TABLET ORAL DAILY
Status: DISCONTINUED | OUTPATIENT
Start: 2021-11-04 | End: 2021-11-05 | Stop reason: HOSPADM

## 2021-11-04 RX ORDER — ACETAMINOPHEN 650 MG/1
650 SUPPOSITORY RECTAL EVERY 6 HOURS PRN
Status: DISCONTINUED | OUTPATIENT
Start: 2021-11-04 | End: 2021-11-05 | Stop reason: HOSPADM

## 2021-11-04 RX ORDER — SODIUM CHLORIDE 9 MG/ML
INJECTION, SOLUTION INTRAVENOUS CONTINUOUS
Status: ACTIVE | OUTPATIENT
Start: 2021-11-04 | End: 2021-11-05

## 2021-11-04 RX ADMIN — SODIUM CHLORIDE 1000 ML: 9 INJECTION, SOLUTION INTRAVENOUS at 15:26

## 2021-11-04 RX ADMIN — LORAZEPAM 0.5 MG: 2 INJECTION INTRAMUSCULAR; INTRAVENOUS at 15:26

## 2021-11-04 RX ADMIN — MAGNESIUM SULFATE HEPTAHYDRATE 2000 MG: 40 INJECTION, SOLUTION INTRAVENOUS at 16:36

## 2021-11-04 RX ADMIN — POTASSIUM CHLORIDE 10 MEQ: 7.46 INJECTION, SOLUTION INTRAVENOUS at 16:35

## 2021-11-04 NOTE — ED PROVIDER NOTES
[facility] Emergency Department    CHIEF COMPLAINT  Panic Attack (Per EMS pt's  called for panic attack, pt has had worsening anxiety \"over the past week\" Pt reports \"feeling overwhelmed\" pt denies SI or HI, pt denies any pain, n/v, or fevers. Pt teary and tachypneic at triage.)      HISTORY OF PRESENT ILLNESS  Lidia Hensley is a 67 y.o. female with a pertinent history of anxiety and depression who presents to the ED complaining of severe anxiety. Most of history obtained from  who reports for the last 5 days patient has been severely anxious, shaking, will not eat, drink, or take any of her medications. Has been reports at one point she did complain of \"pain all over. \"  But otherwise no specific complaint no complaint of chest pain no sign of shortness of breath no cough no fever no emesis or diarrhea.  thought she may have a UTI and had her urine tested by PCP at first they did not think her urine was infected initiated on her Bactrim, but then the urine culture came back negative. Patient also recently started Seroquel  reports he feels as though this helps if she will take the medication he reports she is best in the evenings, morning and afternoon patient has the worst time. Denies suicidal or homicidal ideation. No other complaints, modifying factors or associated symptoms. Nursing notes reviewed.    Past Medical History:   Diagnosis Date    Anxiety     Arthritis     Cancer ([facility] [address] [facility]     endometria    Depression     Diabetes mellitus ([facility] [address]     pre diabetic but take medications    Diverticulitis of colon 12/2012    possible    Fibromyalgia     Glaucoma     Heart murmur     Hyperlipidemia     not currently taking meds    Hypertension     Occult blood in stools     Osteopenia     Prediabetes 2/20/2013    Urinary incontinence      Past Surgical History:   Procedure Laterality Date    CATARACT REMOVAL WITH IMPLANT Left t    COLONOSCOPY  02    hemorrhoids, diverticulosis    COLONOSCOPY  16    normal    CYST REMOVAL Right 2018    removal rifh chest wall sebacious cyst    CYSTOSCOPY N/A 02/15/2019    RIGID CYSTOSCOPY, MID-URETHRAL SLING PLACEMENT     DILATION AND CURETTAGE OF UTERUS      EYE SURGERY Right 13    phaco with IOL    EYE SURGERY Left 2013    cataract removal with implant    HEMORRHOID SURGERY      HYSTERECTOMY      JOINT REPLACEMENT Left     knee    JOINT REPLACEMENT Right 2016    KNEE ARTHROPLASTY      LT    KNEE SURGERY      OR EXC SKIN BENIG <5MM REMAINDR BODY N/A 2018    REMOVAL OF SEBACEOUS CYST - CHEST WALL performed by Rodney Pete MD at [facility] N/A 2/15/2019    RIGID CYSTOSCOPY, MID-URETHRAL SLING PLACEMENT performed by Camila Rincon MD at [address] [place] [address]  16    WISDOM TOOTH EXTRACTION       Family History   Problem Relation Age of Onset    Cancer Mother         Uterine    High Blood Pressure Mother     Cancer Father         Lung    High Blood Pressure Father     High Blood Pressure Sister     Diabetes Brother     Heart Disease Brother     High Blood Pressure Brother     High Blood Pressure Sister     No Known Problems Sister     Heart Disease Brother      Social History     Socioeconomic History    Marital status:      Spouse name: Not on file    Number of children: 3    Years of education: Not on file    Highest education level: Not on file   Occupational History    Not on file   Tobacco Use    Smoking status: Former Smoker     Packs/day: 0.25     Years: 1.00     Pack years: 0.25     Types: Cigarettes     Quit date: 1968     Years since quittin.8    Smokeless tobacco: Never Used   Vaping Use    Vaping Use: Never used   Substance and Sexual Activity    Alcohol use: No     Alcohol/week: 0.0 standard drinks    Drug use: No    Sexual activity: Not on file Other Topics Concern    Not on file   Social History Narrative    Not on file     Social Determinants of Health     Financial Resource Strain:     Difficulty of Paying Living Expenses:    Food Insecurity:     Worried About Running Out of Food in the Last Year:     [address] in the Last Year:    Transportation Needs:     Lack of Transportation (Medical):      Lack of Transportation (Non-Medical):    Physical Activity:     Days of Exercise per Week:     Minutes of Exercise per Session:    Stress:     Feeling of Stress :    Social Connections:     Frequency of Communication with Friends and Family:     Frequency of Social Gatherings with Friends and Family:     Attends Mandaeism Services:     Active Member of Clubs or Organizations:     Attends Club or Organization Meetings:     Marital Status:    Intimate Partner Violence:     Fear of Current or Ex-Partner:     Emotionally Abused:     Physically Abused:     Sexually Abused:      Current Facility-Administered Medications   Medication Dose Route Frequency Provider Last Rate Last Admin    magnesium sulfate 2000 mg in 50 mL IVPB premix  2,000 mg IntraVENous Once SHEY Sow - CNP 25 mL/hr at 11/04/21 1636 2,000 mg at 11/04/21 1636     Current Outpatient Medications   Medication Sig Dispense Refill    sulfamethoxazole-trimethoprim (BACTRIM DS;SEPTRA DS) 800-160 MG per tablet Take 1 tablet by mouth 2 times daily for 5 days 10 tablet 0    metoprolol succinate (TOPROL XL) 100 MG extended release tablet TAKE ONE TABLET BY MOUTH DAILY, TAKE WITH 25MG DOSE FOR A TOTAL OF 125MG DAILY 30 tablet 3    metoprolol succinate (TOPROL XL) 25 MG extended release tablet TAKE ONE TABLET BY MOUTH DAILY 30 tablet 3    metFORMIN (GLUCOPHAGE) 500 MG tablet TAKE ONE TABLET BY MOUTH TWICE A DAY WITH MEALS 60 tablet 5    atorvastatin (LIPITOR) 20 MG tablet TAKE ONE TABLET BY MOUTH DAILY 30 tablet 5    losartan-hydroCHLOROthiazide (HYZAAR) 100-25 MG per tablet TAKE ONE TABLET BY MOUTH DAILY 30 tablet 5    amLODIPine (NORVASC) 5 MG tablet TAKE ONE TABLET BY MOUTH DAILY 30 tablet 5    dorzolamide-timolol 22.3-6.8 MG/ML SOLN Apply 1 drop to eye 2 times daily      LORazepam (ATIVAN) 0.5 MG tablet Take 0.5 mg by mouth daily as needed for Anxiety.  QUEtiapine (SEROQUEL) 25 MG tablet       sertraline (ZOLOFT) 100 MG tablet Take 1.5 tablets by mouth nightly 135 tablet 1    aspirin 81 MG tablet Take 1 tablet by mouth daily 90 tablet 1    latanoprost (XALATAN) 0.005 % ophthalmic solution Place 1 drop into the right eye nightly (Patient taking differently: Place 1 drop into both eyes nightly ) 3 Bottle 0    Multiple Vitamins-Minerals (MULTIVITAL PO) Take  by mouth. Allergies   Allergen Reactions    Norco [Hydrocodone-Acetaminophen] Nausea Only    Ace Inhibitors Rash     Coughing and itching    Tape Merwyn Jazz Tape] Rash       REVIEW OF SYSTEMS  10 systems reviewed, pertinent positives per HPI otherwise noted to be negative    PHYSICAL EXAM  BP (!) 152/75   Pulse 92   Temp 97.6 °F (36.4 °C) (Oral)   Resp 26   Ht 5' (1.524 m)   Wt 250 lb (113.4 kg)   LMP  (LMP Unknown)   SpO2 99%   BMI 48.82 kg/m²   GENERAL APPEARANCE: Awake and alert. Cooperative. No acute distress. Vital signs are stable. Well appearing and non toxic. HEAD: Normocephalic. Atraumatic. EYES: PERRL. EOM's grossly intact. ENT: Mucous membranes are dry. NECK: Supple. Normal ROM. HEART: RRR. Distal pulses are equal and intact. Cap refill less than 2 seconds. LUNGS: Respirations unlabored. CTAB. Good air exchange. Speaking comfortably in full sentences. No wheezing, rhonchi, rales, stridor. ABDOMEN: Soft. Non-distended. Non-tender. No guarding or rebound. No rigidity. Bowel sounds are present. Negative vasquez's. Negative McBurney's point. Negative CVA tenderness. EXTREMITIES: No peripheral edema. Moves all extremities equally. All extremities neurovascularly intact. SKIN: Warm and dry. No acute rashes. NEUROLOGICAL: Alert and oriented. No gross facial drooping. Strength 5/5, sensation intact. PSYCHIATRIC: Anxious. Flat affect. At times tearful. Negative for suicidal or homicidal ideation. SCREENINGS       RADIOLOGY  XR CHEST (2 VW)    Result Date: 11/4/2021  EXAMINATION: TWO XRAY VIEWS OF THE CHEST 11/4/2021 3:28 pm COMPARISON: 10/16/2017 HISTORY: ORDERING SYSTEM PROVIDED HISTORY: anxiety, htn TECHNOLOGIST PROVIDED HISTORY: Reason for exam:->anxiety, htn Reason for Exam: anxiety, htn Acuity: Acute Type of Exam: Initial FINDINGS: The lungs are without acute focal process. There is no effusion or pneumothorax. The cardiomediastinal silhouette is stable. The osseous structures are stable. No acute process. CRITICAL CARE TIME  Total Critical Care time was 35 minutes, excluding separately reportable procedures. There was a high probability of clinically significant/life threatening deterioration in the patient's condition which required my urgent intervention. CONSULTS  IP CONSULT TO PSYCHIATRY  IP CONSULT TO PSYCHIATRY    ED COURSE/MDM  Patient seen and evaluated. Old records reviewed. Diagnostic testing reviewed and results discussed. I have seen this patient in collaboration with supervising physician Dr. Anahi Cisneros. We thoroughly discussed the history, physical exam, diagnostic testing and emergency department course. Mika Santana presented to the ED today with above noted complaints. Blood work notable for a potassium of 3.1, magnesium of 1.3. No anemia or acute kidney injury. Anion gap is elevated at 17. Troponin less than 0.01. EKG interpreted by my attending physician. There are T wave inversions, but no ST elevation or sign of ischemia. Please see her note for further detail. Patient's anxiety improved significantly after Ativan administration.   We began replacing electrolyte abnormalities in the emergency department and admitted patient to the hospital for further management. While in ED patient received   Medications   magnesium sulfate 2000 mg in 50 mL IVPB premix (2,000 mg IntraVENous New Bag 11/4/21 1636)   0.9 % sodium chloride bolus (1,000 mLs IntraVENous New Bag 11/4/21 1526)   LORazepam (ATIVAN) injection 0.5 mg (0.5 mg IntraVENous Given 11/4/21 1526)   potassium chloride 10 mEq/100 mL IVPB (Peripheral Line) (10 mEq IntraVENous New Bag 11/4/21 1635)               A discussion was had with the patient and/or their surrogate regarding diagnosis, diagnostic testing results, treatment/ plan of care. There was shared decision-making between myself as well as the patient and/or their surrogate and we are all in agreement with hospital admission. There was an opportunity for questions and all questions were answered to the best of my ability and to the satisfaction of the patient and/or patient family.      Results for orders placed or performed during the hospital encounter of 11/04/21   CBC auto differential   Result Value Ref Range    WBC 6.3 4.0 - 11.0 K/uL    RBC 4.64 4.00 - 5.20 M/uL    Hemoglobin 14.4 12.0 - 16.0 g/dL    Hematocrit 42.6 36.0 - 48.0 %    MCV 91.7 80.0 - 100.0 fL    MCH 31.0 26.0 - 34.0 pg    MCHC 33.8 31.0 - 36.0 g/dL    RDW 13.2 12.4 - 15.4 %    Platelets 388 293 - 624 K/uL    MPV 8.3 5.0 - 10.5 fL    Neutrophils % 78.3 %    Lymphocytes % 12.7 %    Monocytes % 6.4 %    Eosinophils % 2.3 %    Basophils % 0.3 %    Neutrophils Absolute 4.9 1.7 - 7.7 K/uL    Lymphocytes Absolute 0.8 (L) 1.0 - 5.1 K/uL    Monocytes Absolute 0.4 0.0 - 1.3 K/uL    Eosinophils Absolute 0.1 0.0 - 0.6 K/uL    Basophils Absolute 0.0 0.0 - 0.2 K/uL   Comprehensive Metabolic Panel w/ Reflex to MG   Result Value Ref Range    Sodium 138 136 - 145 mmol/L    Potassium reflex Magnesium 3.1 (L) 3.5 - 5.1 mmol/L    Chloride 105 99 - 110 mmol/L    CO2 16 (L) 21 - 32 mmol/L    Anion Gap 17 (H) 3 - 16    Glucose 123 (H) 70 - 99 mg/dL    BUN 20 7 - 20 mg/dL    CREATININE 0.8 0.6 - 1.2 mg/dL    GFR Non-African American >60 >60    GFR African American >60 >60    Calcium 8.9 8.3 - 10.6 mg/dL    Total Protein 6.2 (L) 6.4 - 8.2 g/dL    Albumin 3.3 (L) 3.4 - 5.0 g/dL    Albumin/Globulin Ratio 1.1 1.1 - 2.2    Total Bilirubin 0.6 0.0 - 1.0 mg/dL    Alkaline Phosphatase 55 40 - 129 U/L    ALT 13 10 - 40 U/L    AST 19 15 - 37 U/L   Troponin   Result Value Ref Range    Troponin <0.01 <0.01 ng/mL   Ethanol   Result Value Ref Range    Ethanol Lvl None Detected mg/dL   Magnesium   Result Value Ref Range    Magnesium 1.30 (L) 1.80 - 2.40 mg/dL   EKG 12 Lead   Result Value Ref Range    Ventricular Rate 79 BPM    Atrial Rate 79 BPM    P-R Interval 162 ms    QRS Duration 82 ms    Q-T Interval 376 ms    QTc Calculation (Bazett) 431 ms    P Axis 43 degrees    R Axis -1 degrees    T Axis -41 degrees    Diagnosis       Normal sinus rhythmModerate voltage criteria for LVH, may be normal variantST & T wave abnormality, consider inferior ischemiaST & T wave abnormality, consider anterolateral ischemiaAbnormal ECGWhen compared with ECG of 20-AUG-2018 15:57,T wave inversion more evident in Inferior leadsT wave inversion now evident in Anterolateral leads       I spoke with Olya Phelps CNP. We thoroughly discussed the history, physical exam, laboratory and imaging studies, as well as, emergency department course. Based upon that discussion, we've decided to admit Maryann Hooker for further observation and evaluation of Caprice Kailash Sinhaer's EKG changes, dehydration and anxiety. FINAL IMPRESSION  1. Hypokalemia    2. Hypomagnesemia    3. Dehydration    4. Anxiety state    5. Acute electrocardiogram changes        Vitals:  Blood pressure (!) 152/75, pulse 92, temperature 97.6 °F (36.4 °C), temperature source Oral, resp. rate 26, height 5' (1.524 m), weight 250 lb (113.4 kg), SpO2 99 %, not currently breastfeeding.       DISPOSITION  Patient was admitted to the hospital in stable condition     Comment: Please note this report has been produced using speech recognition software and may contain errors related to that system including errors in grammar, punctuation, and spelling, as well as words and phrases that may be inappropriate. If there are any questions or concerns please feel free to contact the dictating provider for clarification.             SHEY Rosa CNP  11/04/21 7622

## 2021-11-04 NOTE — ED PROVIDER NOTES
I independently examined and evaluated Ge Cueto. All diagnostic, treatment, and disposition decisions were made by myself in conjunction with the [facility], [employer] [facility]. For all further details of the patient's emergency department visit, please see their documentation. 43-year-old female presents with severe anxiety. She states the past week she has been extremely anxious and cannot eat or drink. Her  states she is clearly not been herself. She is almost paralyzed by her anxiety. She denies suicidal or homicidal ideation. Her primary care doctor put her on Bactrim for presumed UTI but otherwise she has no focal complaints. Vitals:    11/04/21 2023   BP: 136/76   Pulse: 108   Resp: 18   Temp: 97.4 °F (36.3 °C)   SpO2: 96%     On my exam the patient is status post Ativan and she is much more calm and relaxed. She notes a dramatic improvement after Ativan. She is in no respiratory distress. She is mildly tachycardic.     Results for orders placed or performed during the hospital encounter of 11/04/21   CBC auto differential   Result Value Ref Range    WBC 6.3 4.0 - 11.0 K/uL    RBC 4.64 4.00 - 5.20 M/uL    Hemoglobin 14.4 12.0 - 16.0 g/dL    Hematocrit 42.6 36.0 - 48.0 %    MCV 91.7 80.0 - 100.0 fL    MCH 31.0 26.0 - 34.0 pg    MCHC 33.8 31.0 - 36.0 g/dL    RDW 13.2 12.4 - 15.4 %    Platelets 393 215 - 037 K/uL    MPV 8.3 5.0 - 10.5 fL    Neutrophils % 78.3 %    Lymphocytes % 12.7 %    Monocytes % 6.4 %    Eosinophils % 2.3 %    Basophils % 0.3 %    Neutrophils Absolute 4.9 1.7 - 7.7 K/uL    Lymphocytes Absolute 0.8 (L) 1.0 - 5.1 K/uL    Monocytes Absolute 0.4 0.0 - 1.3 K/uL    Eosinophils Absolute 0.1 0.0 - 0.6 K/uL    Basophils Absolute 0.0 0.0 - 0.2 K/uL   Comprehensive Metabolic Panel w/ Reflex to MG   Result Value Ref Range    Sodium 138 136 - 145 mmol/L    Potassium reflex Magnesium 3.1 (L) 3.5 - 5.1 mmol/L    Chloride 105 99 - 110 mmol/L    CO2 16 (L) 21 - 32 mmol/L    Anion Gap 17 (H) 3 - 16    Glucose 123 (H) 70 - 99 mg/dL    BUN 20 7 - 20 mg/dL    CREATININE 0.8 0.6 - 1.2 mg/dL    GFR Non-African American >60 >60    GFR African American >60 >60    Calcium 8.9 8.3 - 10.6 mg/dL    Total Protein 6.2 (L) 6.4 - 8.2 g/dL    Albumin 3.3 (L) 3.4 - 5.0 g/dL    Albumin/Globulin Ratio 1.1 1.1 - 2.2    Total Bilirubin 0.6 0.0 - 1.0 mg/dL    Alkaline Phosphatase 55 40 - 129 U/L    ALT 13 10 - 40 U/L    AST 19 15 - 37 U/L   Troponin   Result Value Ref Range    Troponin <0.01 <0.01 ng/mL   Urine Drug Screen   Result Value Ref Range    Amphetamine Screen, Urine Neg Negative <1000ng/mL    Barbiturate Screen, Ur Neg Negative <200 ng/mL    Benzodiazepine Screen, Urine Neg Negative <200 ng/mL    Cannabinoid Scrn, Ur Neg Negative <50 ng/mL    Cocaine Metabolite Screen, Urine Neg Negative <300 ng/mL    Opiate Scrn, Ur Neg Negative <300 ng/mL    PCP Screen, Urine Neg Negative <25 ng/mL    Methadone Screen, Urine Neg Negative <300 ng/mL    Propoxyphene Scrn, Ur Neg Negative <300 ng/mL    Oxycodone Urine Neg Negative <100 ng/ml    pH, UA 6.0     Drug Screen Comment: see below    Ethanol   Result Value Ref Range    Ethanol Lvl None Detected mg/dL   Magnesium   Result Value Ref Range    Magnesium 1.30 (L) 1.80 - 2.40 mg/dL   Troponin   Result Value Ref Range    Troponin <0.01 <0.01 ng/mL   POCT Glucose   Result Value Ref Range    POC Glucose 160 (H) 70 - 99 mg/dl    Performed on ACCU-CHEK    EKG 12 Lead   Result Value Ref Range    Ventricular Rate 79 BPM    Atrial Rate 79 BPM    P-R Interval 162 ms    QRS Duration 82 ms    Q-T Interval 376 ms    QTc Calculation (Bazett) 431 ms    P Axis 43 degrees    R Axis -1 degrees    T Axis -41 degrees    Diagnosis       Normal sinus rhythmModerate voltage criteria for LVH, may be normal variantST & T wave abnormality, consider inferior ischemiaST & T wave abnormality, consider anterolateral ischemiaAbnormal ECGWhen compared with ECG of 20-AUG-2018 15:57,T wave inversion more evident in Inferior leadsT wave inversion now evident in Anterolateral leads         XR CHEST (2 VW)   Final Result   No acute process. EKG  The Ekg interpreted by myself  normal sinus rhythm with a rate of 79  Axis is   Normal  QTc is  normal  Intervals and Durations are unremarkable. T wave inversions noted in the inferior and lateral leads. No evidence of acute ischemia. The T wave inversions are new when compared to EKG from January 29, 2019. Here the patient's lab work shows hypokalemia and hypomagnesemia. Likely due to the fact that she has not been eating for several days. We are giving her IV fluids and replacing her electrolytes. She does feel better after Ativan. It sounds like this anxiety has been quite debilitating. Although she improved here with a dose of Ativan I suspect this will be short-lived. We will admit her for electrolyte replacement and inpatient psychiatric consultation.      Margarita Scott MD  11/06/21 [id number]

## 2021-11-04 NOTE — ED NOTES
[address] Floor [facility] ED Tech Tyler called Claire for telepsych set up. 1800- ED Tech Jonathan Michelle [phone] again and was told patient wouldn't be seen until tomorrow  1810- [address] [facility] AN AFFILIATE OF [facility] for telepsych and said they could telesych patient but since patient is being admitted for Medical reasons at [facility], telepsych wouldn't be necessary but our Psychiatrist here could see her inpatient. Made Justin KAT-CNP aware and was told that would be fine. [address] via PS to make him aware of patient. Made Floor RN aware of consult.          Negrita Babcock  11/04/21 2307

## 2021-11-04 NOTE — CONSULTS
Full note dictated. Met with patient and her . Reviewed chart    Dx:  Panic disorder without agoraphobia         R/o generalized anxiety disorder    Rec:  1). Continue zoloft which she has been on for 3-4 years and thought of as helping after death of her sister and brother in law. I would not increase that dose at her age. 2).  She was recently started on seroquel by Dr Yovany Lau who she sees outpatient but only took it for 24 hours. I will schedule her on seroquel for 50 mgs HS and 25 each AM.  Benzodiazepines tend to work for her and if we cannot control with seroquel it may be reasonable to use a routine BZD instead of seroquel.               3).  No need for inpatient psych and she has follow up with Dr Tim Eckert (psychiatry)    Hafsa Lui MD

## 2021-11-04 NOTE — H&P
Hospital Medicine History & Physical      PCP: Debi Romo APRN - CNP    Date of Admission: 11/4/2021     Date of Service: Pt seen/examined on 11/4/2021 and Admitted to observation with expected LOS less than two midnights due to medical therapy. Chief Complaint:  Panic attack    History Of Present Illness:      67 y.o. female, with PMH of anxiety depression, HTN, HLD and morbid obesity, who presented to [facility] with a panic attack. History obtained from the patient and review of EMR. The patient has a history of anxiety. She stated over the last few days her anxiety has been escalating until she had a panic attack today. The patient stated she \"feels overwhelmed\", but would no elaborate. She denies any SI and/or HI. The patients  is at the bedside and reported that the patient has been extremely anxious over the last 5 days. He stated she has not been eating and/or drinking well and she has been shaking. The patients  stated the patient has not been taking her medications at home. He stated he initially thought the patient may have a UTI and her urine was checked by her PCP. She was ultimately started on bactrim in which she has 3 days left. The patient was given ativan in the ED and staff reported it helped the patient tremendously. She will be admitted for further evaluation and treatment. Psychiatry has been consulted. The patient denied any other associated symptoms as well as any aggravating and/or alleviating factors. At the time of this assessment, the patient was resting comfortably in bed. She currently denies any chest pain, back pain, abdominal pain, shortness of breath, numbness, tingling, N/V/C/D, fever and/or chills.      Past Medical History:          Diagnosis Date    Anxiety     Arthritis     Cancer ([facility]     endometria    Depression     Diabetes mellitus ([facility]     pre diabetic but take medications    Diverticulitis of colon 12/2012    possible    Fibromyalgia     Glaucoma     Heart murmur     Hyperlipidemia     not currently taking meds    Hypertension     Occult blood in stools     Osteopenia     Prediabetes 2/20/2013    Urinary incontinence      Past Surgical History:          Procedure Laterality Date    CATARACT REMOVAL WITH IMPLANT Left t    COLONOSCOPY  12/03/02 01/18/13    hemorrhoids, diverticulosis    COLONOSCOPY  2/26/16    normal    CYST REMOVAL Right 08/24/2018    removal rifh chest wall sebacious cyst    CYSTOSCOPY N/A 02/15/2019    RIGID CYSTOSCOPY, MID-URETHRAL SLING PLACEMENT     DILATION AND CURETTAGE OF UTERUS      EYE SURGERY Right 8/16/13    phaco with IOL    EYE SURGERY Left 2013    cataract removal with implant    HEMORRHOID SURGERY      HYSTERECTOMY      JOINT REPLACEMENT Left 2010    knee    JOINT REPLACEMENT Right 09/14/2016    KNEE ARTHROPLASTY      LT    KNEE SURGERY      NJ EXC SKIN BENIG <5MM REMAINDR BODY N/A 8/24/2018    REMOVAL OF SEBACEOUS CYST - CHEST WALL performed by Leni Villegas MD at [facility] N/A 2/15/2019    RIGID CYSTOSCOPY, MID-URETHRAL SLING PLACEMENT performed by Citlali Arteaga MD at [address] [place] [address]  2/26/16    WISDOM TOOTH EXTRACTION       Medications Prior to Admission:      Prior to Admission medications    Medication Sig Start Date End Date Taking?  Authorizing Provider   sulfamethoxazole-trimethoprim (BACTRIM DS;SEPTRA DS) 800-160 MG per tablet Take 1 tablet by mouth 2 times daily for 5 days 11/1/21 11/6/21  SHEY Dumont CNP   metoprolol succinate (TOPROL XL) 100 MG extended release tablet TAKE ONE TABLET BY MOUTH DAILY, TAKE WITH 25MG DOSE FOR A TOTAL OF 125MG DAILY 10/5/21   SHEY Dumont CNP   metoprolol succinate (TOPROL XL) 25 MG extended release tablet TAKE ONE TABLET BY MOUTH DAILY 9/21/21   SHEY Dumont CNP   metFORMIN (GLUCOPHAGE) 500 MG tablet TAKE ONE TABLET BY MOUTH TWICE A DAY WITH MEALS 7/19/21   Nica Heath APRN - CNP   atorvastatin (LIPITOR) 20 MG tablet TAKE ONE TABLET BY MOUTH DAILY 7/1/21   Nica Heath APRN - DUTCH   losartan-hydroCHLOROthiazide (HYZAAR) 100-25 MG per tablet TAKE ONE TABLET BY MOUTH DAILY 5/24/21   Nica Heath APRN - CNP   amLODIPine (NORVASC) 5 MG tablet TAKE ONE TABLET BY MOUTH DAILY 5/10/21   Nica Heath APRN - DUTCH   dorzolamide-timolol 22.3-6.8 MG/ML SOLN Apply 1 drop to eye 2 times daily    Historical Provider, MD   LORazepam (ATIVAN) 0.5 MG tablet Take 0.5 mg by mouth daily as needed for Anxiety. Historical Provider, MD   QUEtiapine (SEROQUEL) 25 MG tablet  11/27/20   Historical Provider, MD   sertraline (ZOLOFT) 100 MG tablet Take 1.5 tablets by mouth nightly 8/6/20   Crystal Izquierdo MD   aspirin 81 MG tablet Take 1 tablet by mouth daily 5/2/18   Crystal Izquierdo MD   latanoprost (XALATAN) 0.005 % ophthalmic solution Place 1 drop into the right eye nightly  Patient taking differently: Place 1 drop into both eyes nightly  5/2/18   Crystal Izquierdo MD   Multiple Vitamins-Minerals (MULTIVITAL PO) Take  by mouth. Historical Provider, MD     Allergies:  Norco [hydrocodone-acetaminophen], Ace inhibitors, and Tape [adhesive tape]    Social History:      The patient currently lives at home    TOBACCO:   reports that she quit smoking about 53 years ago. Her smoking use included cigarettes. She has a 0.25 pack-year smoking history. She has never used smokeless tobacco.  ETOH:   reports no history of alcohol use. E-Cigarettes/Vaping Use     Questions Responses    E-Cigarette/Vaping Use Never User    Start Date     Passive Exposure     Quit Date     Counseling Given     Comments         Family History:      Reviewed in detail.  Positive as follows:        Problem Relation Age of Onset    Cancer Mother         Uterine    High Blood Pressure Mother     Cancer Father         Lung    High Blood Pressure Father     High Blood Pressure Sister  Diabetes Brother     Heart Disease Brother     High Blood Pressure Brother     High Blood Pressure Sister     No Known Problems Sister     Heart Disease Brother      REVIEW OF SYSTEMS COMPLETED:   Pertinent positives as noted in the HPI. All other systems reviewed and negative. PHYSICAL EXAM PERFORMED:    BP (!) 152/75   Pulse 92   Temp 97.6 °F (36.4 °C) (Oral)   Resp 26   Ht 5' (1.524 m)   Wt 250 lb (113.4 kg)   LMP  (LMP Unknown)   SpO2 99%   BMI 48.82 kg/m²     General appearance:  Pleasant female in no apparent distress, appears stated age and cooperative. HEENT:  Pupils equal, round, and reactive to light. Extra ocular muscles intact. Conjunctivae/corneas clear. Neck: Supple, with full range of motion. No jugular venous distention. Trachea midline. Respiratory:  Normal respiratory effort. Clear to auscultation, bilaterally without Rales/Wheezes/Rhonchi. Cardiovascular:  Regular rate and rhythm with normal S1/S2 without murmurs, rubs or gallops. Abdomen: Soft, non-tender, non-distended with normal bowel sounds. Musculoskeletal:  No clubbing, cyanosis or edema bilaterally. Full range of motion without deformity. Skin: Skin color, texture, turgor normal.  No significant rashes or lesions. Neurologic:  Neurovascularly intact.  Cranial nerves: II-XII intact, grossly non-focal.  Psychiatric:  Alert and oriented, thought content appropriate, normal insight  Capillary Refill: Brisk,3 seconds, normal  Peripheral Pulses: +2 palpable, equal bilaterally     Labs:     Recent Labs     11/04/21  1434   WBC 6.3   HGB 14.4   HCT 42.6        Recent Labs     11/04/21  1434      K 3.1*      CO2 16*   BUN 20   CREATININE 0.8   CALCIUM 8.9     Recent Labs     11/04/21  1434   AST 19   ALT 13   BILITOT 0.6   ALKPHOS 55     Recent Labs     11/04/21  1434   TROPONINI <0.01     Urinalysis:      Lab Results   Component Value Date    NITRU Negative 01/29/2019    WBCUA 1 01/29/2019 BACTERIA RARE 08/30/2016    RBCUA 3 01/29/2019    BLOODU trace 11/01/2021    BLOODU Negative 01/29/2019    SPECGRAV 1.020 11/01/2021    SPECGRAV 1.016 01/29/2019    GLUCOSEU neg 11/01/2021    GLUCOSEU Negative 01/29/2019    GLUCOSEU NEGATIVE 02/16/2010     Radiology:     CXR: I have reviewed the CXR with the following interpretation: No acute cardiopulmonary findings    EKG:  I have reviewed the EKG with the following interpretation: The Ekg interpreted in the absence of a cardiologist shows. Normal sinus rhythmModerate voltage criteria for LVH, may be normal variantST & T wave abnormality, consider inferior ischemiaST & T wave abnormality, consider anterolateral ischemiaAbnormal ECGWhen compared with ECG of 20-AUG-2018 15:57,T wave inversion more evident in Inferior leadsT wave inversion now evident in Anterolateral leads    XR CHEST (2 VW)   Final Result   No acute process. ASSESSMENT:    Active Hospital Problems    Diagnosis Date Noted    Anxiety [F41.9]     Major depressive disorder, recurrent, moderate ([address] [F33.1] 03/12/2021    Morbidly obese ([address] [E66.01] 11/17/2020    HTN (hypertension) [I10] 07/19/2010     PLAN:    Panic attack in patient with known anxiety and depression  -mood appears stable at this time and patient calm  -received ativan in ED  -continue home meds  -UDS pending  -UA pending  -1 L NS given in ED  -tele monitoring  -psychiatry consulted in ED - appreciate recommendations in advance  -prn ativan    Essential HTN  -continue metoprolol, amlodipine and hyzaar    HLD  -continue atorvastatin    Morbid obesity  With Body mass index is 48.8 kg/m². Complicating assessment and treatment. Placing patient at risk for multiple co-morbidities as well as early death and contributing to the patient's presentation.  Counseled on weight loss    DM2, controlled  -  -hemglobin a1c in am  -hold home metformin  -ldssi  -poct ac/hs  -hypoglycemia protocol  -carb control diet    Hypokalemia, 3.1 on admission  -replaced in ED  -repeat K in am    Hypomagnesemia, 1.3 on admission  -replaced in ED  -repeat mag in am    DVT Prophylaxis: Lovenox    Diet: No diet orders on file     Code Status: Prior    PT/OT Eval Status: no indication for need at this time    Dispo - 1-2 days pending clinical improvement     SHEY Gracia - CNP    Thank you SHEY Zimmerman CNP for the opportunity to be involved in this patient's care.  If you have any questions or concerns please feel free to contact me at [phone].  ---------------------------------Anticipated Dr.Hausfeld yovani------------------------------------

## 2021-11-04 NOTE — ED NOTES
Bed: 18  Expected date:   Expected time:   Means of arrival:   Comments:  [facility] , RN  11/04/21 5143

## 2021-11-05 VITALS
SYSTOLIC BLOOD PRESSURE: 136 MMHG | BODY MASS INDEX: 39.3 KG/M2 | HEART RATE: 82 BPM | WEIGHT: 200.2 LBS | HEIGHT: 60 IN | TEMPERATURE: 97.7 F | OXYGEN SATURATION: 95 % | DIASTOLIC BLOOD PRESSURE: 78 MMHG | RESPIRATION RATE: 15 BRPM

## 2021-11-05 LAB
ANION GAP SERPL CALCULATED.3IONS-SCNC: 6 MMOL/L (ref 3–16)
BASOPHILS ABSOLUTE: 0 K/UL (ref 0–0.2)
BASOPHILS RELATIVE PERCENT: 0.2 %
BUN BLDV-MCNC: 15 MG/DL (ref 7–20)
CALCIUM SERPL-MCNC: 9.4 MG/DL (ref 8.3–10.6)
CHLORIDE BLD-SCNC: 102 MMOL/L (ref 99–110)
CO2: 29 MMOL/L (ref 21–32)
CREAT SERPL-MCNC: 0.9 MG/DL (ref 0.6–1.2)
EKG ATRIAL RATE: 79 BPM
EKG DIAGNOSIS: NORMAL
EKG P AXIS: 43 DEGREES
EKG P-R INTERVAL: 162 MS
EKG Q-T INTERVAL: 376 MS
EKG QRS DURATION: 82 MS
EKG QTC CALCULATION (BAZETT): 431 MS
EKG R AXIS: -1 DEGREES
EKG T AXIS: -41 DEGREES
EKG VENTRICULAR RATE: 79 BPM
EOSINOPHILS ABSOLUTE: 0.3 K/UL (ref 0–0.6)
EOSINOPHILS RELATIVE PERCENT: 4.6 %
ESTIMATED AVERAGE GLUCOSE: 116.9 MG/DL
GFR AFRICAN AMERICAN: >60
GFR NON-AFRICAN AMERICAN: >60
GLUCOSE BLD-MCNC: 112 MG/DL (ref 70–99)
GLUCOSE BLD-MCNC: 143 MG/DL (ref 70–99)
GLUCOSE BLD-MCNC: 181 MG/DL (ref 70–99)
HBA1C MFR BLD: 5.7 %
HCT VFR BLD CALC: 36.7 % (ref 36–48)
HEMOGLOBIN: 12.5 G/DL (ref 12–16)
LYMPHOCYTES ABSOLUTE: 0.6 K/UL (ref 1–5.1)
LYMPHOCYTES RELATIVE PERCENT: 10.3 %
MAGNESIUM: 1.9 MG/DL (ref 1.8–2.4)
MCH RBC QN AUTO: 30.7 PG (ref 26–34)
MCHC RBC AUTO-ENTMCNC: 34 G/DL (ref 31–36)
MCV RBC AUTO: 90.3 FL (ref 80–100)
MONOCYTES ABSOLUTE: 0.5 K/UL (ref 0–1.3)
MONOCYTES RELATIVE PERCENT: 8.2 %
NEUTROPHILS ABSOLUTE: 4.7 K/UL (ref 1.7–7.7)
NEUTROPHILS RELATIVE PERCENT: 76.7 %
PDW BLD-RTO: 13.4 % (ref 12.4–15.4)
PERFORMED ON: ABNORMAL
PERFORMED ON: ABNORMAL
PLATELET # BLD: 205 K/UL (ref 135–450)
PMV BLD AUTO: 8.1 FL (ref 5–10.5)
POTASSIUM REFLEX MAGNESIUM: 3.8 MMOL/L (ref 3.5–5.1)
RBC # BLD: 4.06 M/UL (ref 4–5.2)
SODIUM BLD-SCNC: 137 MMOL/L (ref 136–145)
TROPONIN: <0.01 NG/ML
WBC # BLD: 6.1 K/UL (ref 4–11)

## 2021-11-05 PROCEDURE — 80048 BASIC METABOLIC PNL TOTAL CA: CPT

## 2021-11-05 PROCEDURE — 36415 COLL VENOUS BLD VENIPUNCTURE: CPT

## 2021-11-05 PROCEDURE — 6370000000 HC RX 637 (ALT 250 FOR IP): Performed by: NURSE PRACTITIONER

## 2021-11-05 PROCEDURE — 6370000000 HC RX 637 (ALT 250 FOR IP): Performed by: PSYCHIATRY & NEUROLOGY

## 2021-11-05 PROCEDURE — 96372 THER/PROPH/DIAG INJ SC/IM: CPT

## 2021-11-05 PROCEDURE — 6360000002 HC RX W HCPCS: Performed by: NURSE PRACTITIONER

## 2021-11-05 PROCEDURE — 6370000000 HC RX 637 (ALT 250 FOR IP): Performed by: INTERNAL MEDICINE

## 2021-11-05 PROCEDURE — 96376 TX/PRO/DX INJ SAME DRUG ADON: CPT

## 2021-11-05 PROCEDURE — 83735 ASSAY OF MAGNESIUM: CPT

## 2021-11-05 PROCEDURE — 85025 COMPLETE CBC W/AUTO DIFF WBC: CPT

## 2021-11-05 PROCEDURE — 99226 PR SBSQ OBSERVATION CARE/DAY 35 MINUTES: CPT | Performed by: PSYCHIATRY & NEUROLOGY

## 2021-11-05 PROCEDURE — 93010 ELECTROCARDIOGRAM REPORT: CPT | Performed by: INTERNAL MEDICINE

## 2021-11-05 PROCEDURE — 84484 ASSAY OF TROPONIN QUANT: CPT

## 2021-11-05 PROCEDURE — G0378 HOSPITAL OBSERVATION PER HR: HCPCS

## 2021-11-05 RX ORDER — QUETIAPINE FUMARATE 25 MG/1
25 TABLET, FILM COATED ORAL DAILY
Qty: 60 TABLET | Refills: 3 | Status: ON HOLD | OUTPATIENT
Start: 2021-11-05 | End: 2021-12-30 | Stop reason: HOSPADM

## 2021-11-05 RX ORDER — POTASSIUM CHLORIDE 20 MEQ/1
40 TABLET, EXTENDED RELEASE ORAL PRN
Status: DISCONTINUED | OUTPATIENT
Start: 2021-11-05 | End: 2021-11-05 | Stop reason: HOSPADM

## 2021-11-05 RX ORDER — QUETIAPINE FUMARATE 50 MG/1
50 TABLET, FILM COATED ORAL NIGHTLY
Qty: 60 TABLET | Refills: 3 | Status: ON HOLD | OUTPATIENT
Start: 2021-11-05 | End: 2021-12-30 | Stop reason: HOSPADM

## 2021-11-05 RX ORDER — MAGNESIUM SULFATE 1 G/100ML
1000 INJECTION INTRAVENOUS PRN
Status: DISCONTINUED | OUTPATIENT
Start: 2021-11-05 | End: 2021-11-05 | Stop reason: HOSPADM

## 2021-11-05 RX ORDER — POTASSIUM CHLORIDE 7.45 MG/ML
10 INJECTION INTRAVENOUS PRN
Status: DISCONTINUED | OUTPATIENT
Start: 2021-11-05 | End: 2021-11-05 | Stop reason: HOSPADM

## 2021-11-05 NOTE — CARE COORDINATION
CM aware of d/c order. Per RN, pt is a/o, ambulatory and has no DCP needs. Pt has PCP and insurance. Please contact CM should needs arise.   Henrique Jimenez RN

## 2021-11-05 NOTE — CONSULTS
[address] [address]                                  CONSULTATION    PATIENT NAME: Zeyad Mail                  :        1945  MED REC NO:   [id number]                          ROOM:       0210  ACCOUNT NO:   [de-identified]                           ADMIT DATE: 2021  PROVIDER:     Sofia Bernabe MD    CONSULT DATE:  2021    HISTORY OF PRESENT ILLNESS:  The patient is a 67-year-old who was  brought to the hospital by her  after reporting that the patient  was having some mental status changes accompanied by severe anxiety and  panic symptoms. He took her to the emergency room and there was concern  that the patient may have an UTI and so she was admitted for further  workup. The patient's  was in the room and he indicated that the patient  has suffered with depression and anxiety symptoms since 3 to 4 years ago  when in one short period of time, the patient's sister and his brother  whom the patient was very close to  very close to each other. She  struggled after that and has been Zoloft currently at 150 mg for a  couple of years and both the patient and her  indicated that  typically that has been helpful in terms of her mood. Normally her mood  is jovial, she enjoys getting up doing chores, having play dates with  her friends. They play cards every couple of months together, but when  the patient is struggling with her mood as she is now, she becomes very  anxious and that  described her as \"foggy headed\" by which he  meant that there are times in the last couple of days where the patient  seemed to struggle to process information and this was not accompanied  by any fever that he knew of.   The patient came to the hospital and was  apparently given Ativan and stated that \"it was like a miracle her  thought processes slowed down, she was smiling again and was my old  surely. \"    PAST PSYCHIATRIC HISTORY:  The patient has been seen by Dr. Baldemar Dukes  who is her outpatient psychiatrist.  He had started her on Seroquel 25  mg at bedtime and p.r.n. at 12.5 mg, but the  indicated that she  had only taken one dose and when things became so difficult they brought  her to the hospital.  She does not have any history of suicide attempts. She has never been hospitalized psychiatrically. FAMILY PSYCHIATRIC HISTORY:  Includes one of her sisters who may have  suffered with some anxiety and depression, but there is no history of  completed suicides. 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 They have  three boys together and they are all doing well and they have a number  of grandchildren that they \"struggled to keep up with. \"  There is no  alcohol or drug use in the patient's history. The patient does not  report feeling unsafe at home and from a financial standpoint they are  certainly getting by. REVIEW OF SYSTEMS:  A 10-system review was completed and other than what  is noted above the rest were negative. PHYSICAL EXAMINATION:  VITAL SIGNS:  Her temperature was 98.2, her pulse was 100, her  respirations were 16, her blood pressure was 128/78. NEUROMUSCULAR EXAM:  The patient appeared to be of normal muscle,  strength and tone throughout. Her gait was not ataxic. MENTAL STATUS EXAM:  The patient appears as a female who looks younger  than her stated age. She is very cooperative, maintains good eye  contact. There is no abnormal movements, tics or mannerisms and no  tardive dyskinesia. Her thought processes are well organized. There is  no auditory or visual hallucinations or delusions. She is not paranoid. She is alert and oriented x4. Her mood is anxious, more so than  depressed and her affect is consistent with that.   Again, she denies  suicidal or homicidal

## 2021-11-05 NOTE — PROGRESS NOTES
Department of Psychiatry  Consultant Progress Note  Chief Complaint: follow-up panic disorder. Pt slept well until IV issue woke her up, then got ativan and she was a little sluggish this morning. But thought processes clear and no \"fog-headedness\" that  had mentioned. Discussed follow up with Dr Shawn Oswald. They will need Rx for the seroquel at the new dose. Patient's chart was reviewed and collaborated with  about the treatment plan. .  SUBJECTIVE:    Patient is feeling better.  Suicidal ideation: denies suicidal ideation  Patient denies    ROS: Patient has new complaints: no  Sleeping adequately:  Yes  Appetite adequate: yes  Attending groups: N/A  Visitors:yes -  at bedside    OBJECTIVE    Physical  VITALS:  /78   Pulse 82   Temp 97.7 °F (36.5 °C) (Oral)   Resp 15   Ht 5' (1.524 m)   Wt 200 lb 3.2 oz (90.8 kg)   LMP  (LMP Unknown)   SpO2 95%   BMI 39.10 kg/m²   Patients appearance well-appearing and in hospital clothes    Musculoskeletal  Patient gait not checked   STRENGTH: normal throughout upper and lower extremities TONE normal    Mental Status Examination:   No current loose associations  ConcentrationIntact   Thoughts are within normal limits  Insight and Judgement normal insight and judgment  Knowledge: good  Language: 0 - no aphasia, normal  Speech: appropriate   Mood anxiety improved, affect congruent with mood  Orientation: oriented to person,place, date, situation   Hallucinations Absent   Thought Processes: Goal-Directed  Memory intact  suicidal ideations absent with  no plan  Homicidal ideations no           Data  Labs:   Admission on 11/04/2021   Component Date Value Ref Range Status    WBC 11/04/2021 6.3  4.0 - 11.0 K/uL Final    RBC 11/04/2021 4.64  4.00 - 5.20 M/uL Final    Hemoglobin 11/04/2021 14.4  12.0 - 16.0 g/dL Final    Hematocrit 11/04/2021 42.6  36.0 - 48.0 % Final    MCV 11/04/2021 91.7  80.0 - 100.0 fL Final    MCH 11/04/2021 31.0  26.0 - 34.0 pg Final    MCHC 11/04/2021 33.8  31.0 - 36.0 g/dL Final    RDW 11/04/2021 13.2  12.4 - 15.4 % Final    Platelets 65/90/3677 216  135 - 450 K/uL Final    MPV 11/04/2021 8.3  5.0 - 10.5 fL Final    Neutrophils % 11/04/2021 78.3  % Final    Lymphocytes % 11/04/2021 12.7  % Final    Monocytes % 11/04/2021 6.4  % Final    Eosinophils % 11/04/2021 2.3  % Final    Basophils % 11/04/2021 0.3  % Final    Neutrophils Absolute 11/04/2021 4.9  1.7 - 7.7 K/uL Final    Lymphocytes Absolute 11/04/2021 0.8* 1.0 - 5.1 K/uL Final    Monocytes Absolute 11/04/2021 0.4  0.0 - 1.3 K/uL Final    Eosinophils Absolute 11/04/2021 0.1  0.0 - 0.6 K/uL Final    Basophils Absolute 11/04/2021 0.0  0.0 - 0.2 K/uL Final    Sodium 11/04/2021 138  136 - 145 mmol/L Final    Potassium reflex Magnesium 11/04/2021 3.1* 3.5 - 5.1 mmol/L Final    Chloride 11/04/2021 105  99 - 110 mmol/L Final    CO2 11/04/2021 16* 21 - 32 mmol/L Final    Anion Gap 11/04/2021 17* 3 - 16 Final    Glucose 11/04/2021 123* 70 - 99 mg/dL Final    BUN 11/04/2021 20  7 - 20 mg/dL Final    CREATININE 11/04/2021 0.8  0.6 - 1.2 mg/dL Final    GFR Non- 11/04/2021 >60  >60 Final    Comment: >60 mL/min/1.73m2 EGFR, calc. for ages 25 and older using the  MDRD formula (not corrected for weight), is valid for stable  renal function.  GFR  11/04/2021 >60  >60 Final    Comment: Chronic Kidney Disease: less than 60 ml/min/1.73 sq.m. Kidney Failure: less than 15 ml/min/1.73 sq.m. Results valid for patients 18 years and older.       Calcium 11/04/2021 8.9  8.3 - 10.6 mg/dL Final    Total Protein 11/04/2021 6.2* 6.4 - 8.2 g/dL Final    Albumin 11/04/2021 3.3* 3.4 - 5.0 g/dL Final    Albumin/Globulin Ratio 11/04/2021 1.1  1.1 - 2.2 Final    Total Bilirubin 11/04/2021 0.6  0.0 - 1.0 mg/dL Final    Alkaline Phosphatase 11/04/2021 55  40 - 129 U/L Final    ALT 11/04/2021 13  10 - 40 U/L Final    AST 11/04/2021 19  15 - 37 U/L Final    Comment: Specimen hemolysis has exceeded the interference as defined by Roche. Value may be falsely increased. Suggest recollection if clinically  indicated.  Troponin 11/04/2021 <0.01  <0.01 ng/mL Final    Methodology by Troponin T    Ventricular Rate 11/04/2021 79  BPM Preliminary    Atrial Rate 11/04/2021 79  BPM Preliminary    P-R Interval 11/04/2021 162  ms Preliminary    QRS Duration 11/04/2021 82  ms Preliminary    Q-T Interval 11/04/2021 376  ms Preliminary    QTc Calculation (Bazett) 11/04/2021 431  ms Preliminary    P Axis 11/04/2021 43  degrees Preliminary    R Axis 11/04/2021 -1  degrees Preliminary    T Axis 11/04/2021 -41  degrees Preliminary    Diagnosis 11/04/2021 Normal sinus rhythmModerate voltage criteria for LVH, may be normal variantST & T wave abnormality, consider inferior ischemiaST & T wave abnormality, consider anterolateral ischemiaAbnormal ECGWhen compared with ECG of 20-AUG-2018 15:57,T wave inversion more evident in Inferior leadsT wave inversion now evident in Anterolateral leads   Preliminary    Amphetamine Screen, Urine 11/04/2021 Neg  Negative <1000ng/mL Final    Barbiturate Screen, Ur 11/04/2021 Neg  Negative <200 ng/mL Final    Benzodiazepine Screen, Urine 11/04/2021 Neg  Negative <200 ng/mL Final    Cannabinoid Scrn, Ur 11/04/2021 Neg  Negative <50 ng/mL Final    Cocaine Metabolite Screen, Urine 11/04/2021 Neg  Negative <300 ng/mL Final    Opiate Scrn, Ur 11/04/2021 Neg  Negative <300 ng/mL Final    Comment: \"Therapeutic levels of pain medication, especially oxycontin and synthetic  opioids, may not be detected by this Methodology. Pain management screen  panel  Drug panel-PM-Hi Res Ur, Interp (PAIN) should be considered for drug  monitoring \".       PCP Screen, Urine 11/04/2021 Neg  Negative <25 ng/mL Final    Methadone Screen, Urine 11/04/2021 Neg  Negative <300 ng/mL Final    Propoxyphene Scrn, Ur 11/04/2021 Neg  Negative <300 ng/mL Final    Oxycodone Urine 11/04/2021 Neg  Negative <100 ng/ml Final    pH, UA 11/04/2021 6.0   Final    Comment: Urine pH less than 5.0 or greater than 8.0 may indicate sample adulteration. Another sample should be collected if clinically  indicated.  Drug Screen Comment: 11/04/2021 see below   Final    Comment: This method is a screening test to detect only these drug  classes as part of a medical workup. Confirmatory testing  by another method should be ordered if clinically indicated.  Ethanol Lvl 11/04/2021 None Detected  mg/dL Final    Comment:    None Detected  Conversion factor:  100 mg/dl = .100 g/dl  For Medical Purposes Only      Magnesium 11/04/2021 1.30* 1.80 - 2.40 mg/dL Final    Sodium 11/05/2021 137  136 - 145 mmol/L Final    Potassium reflex Magnesium 11/05/2021 3.8  3.5 - 5.1 mmol/L Final    Chloride 11/05/2021 102  99 - 110 mmol/L Final    CO2 11/05/2021 29  21 - 32 mmol/L Final    Anion Gap 11/05/2021 6  3 - 16 Final    Glucose 11/05/2021 143* 70 - 99 mg/dL Final    BUN 11/05/2021 15  7 - 20 mg/dL Final    CREATININE 11/05/2021 0.9  0.6 - 1.2 mg/dL Final    GFR Non- 11/05/2021 >60  >60 Final    Comment: >60 mL/min/1.73m2 EGFR, calc. for ages 25 and older using the  MDRD formula (not corrected for weight), is valid for stable  renal function.  GFR  11/05/2021 >60  >60 Final    Comment: Chronic Kidney Disease: less than 60 ml/min/1.73 sq.m. Kidney Failure: less than 15 ml/min/1.73 sq.m. Results valid for patients 18 years and older.       Calcium 11/05/2021 9.4  8.3 - 10.6 mg/dL Final    WBC 11/05/2021 6.1  4.0 - 11.0 K/uL Final    RBC 11/05/2021 4.06  4.00 - 5.20 M/uL Final    Hemoglobin 11/05/2021 12.5  12.0 - 16.0 g/dL Final    Hematocrit 11/05/2021 36.7  36.0 - 48.0 % Final    MCV 11/05/2021 90.3  80.0 - 100.0 fL Final    MCH 11/05/2021 30.7  26.0 - 34.0 pg Final    MCHC 11/05/2021 34.0  31.0 - 36.0 g/dL Final    RDW 11/05/2021 13.4  12.4 - 15.4 % Final    Platelets 15/33/7726 205  135 - 450 K/uL Final    MPV 11/05/2021 8.1  5.0 - 10.5 fL Final    Neutrophils % 11/05/2021 76.7  % Final    Lymphocytes % 11/05/2021 10.3  % Final    Monocytes % 11/05/2021 8.2  % Final    Eosinophils % 11/05/2021 4.6  % Final    Basophils % 11/05/2021 0.2  % Final    Neutrophils Absolute 11/05/2021 4.7  1.7 - 7.7 K/uL Final    Lymphocytes Absolute 11/05/2021 0.6* 1.0 - 5.1 K/uL Final    Monocytes Absolute 11/05/2021 0.5  0.0 - 1.3 K/uL Final    Eosinophils Absolute 11/05/2021 0.3  0.0 - 0.6 K/uL Final    Basophils Absolute 11/05/2021 0.0  0.0 - 0.2 K/uL Final    Troponin 11/04/2021 <0.01  <0.01 ng/mL Final    Methodology by Troponin T    Troponin 11/05/2021 <0.01  <0.01 ng/mL Final    Methodology by Troponin T    Hemoglobin A1C 11/04/2021 5.7  See comment % Final    Comment: Comment:  Diagnosis of Diabetes: > or = 6.5%  Increased risk of diabetes (Prediabetes): 5.7-6.4%  Glycemic Control: Nonpregnant Adults: <7.0%                    Pregnant: <6.0%        eAG 11/04/2021 116.9  mg/dL Final    POC Glucose 11/04/2021 160* 70 - 99 mg/dl Final    Performed on 11/04/2021 ACCU-CHEK   Final    Magnesium 11/05/2021 1.90  1.80 - 2.40 mg/dL Final    POC Glucose 11/05/2021 112* 70 - 99 mg/dl Final    Performed on 11/05/2021 ACCU-CHEK   Final            Medications  Current Facility-Administered Medications: magnesium sulfate 1000 mg in dextrose 5% 100 mL IVPB, 1,000 mg, IntraVENous, PRN  potassium chloride (KLOR-CON M) extended release tablet 40 mEq, 40 mEq, Oral, PRN **OR** potassium bicarb-citric acid (EFFER-K) effervescent tablet 40 mEq, 40 mEq, Oral, PRN **OR** potassium chloride 10 mEq/100 mL IVPB (Peripheral Line), 10 mEq, IntraVENous, PRN  LORazepam (ATIVAN) injection 2 mg, 2 mg, IntraVENous, Q6H PRN  amLODIPine (NORVASC) tablet 5 mg, 5 mg, Oral, Daily  aspirin chewable tablet 81 mg, 81 mg, Oral, Daily  atorvastatin (LIPITOR) tablet 20 mg, 20 mg, Oral, Daily  dorzolamide-timolol (COSOPT) 22.3-6.8 MG/ML ophthalmic solution 1 drop, 1 drop, Ophthalmic, BID  latanoprost (XALATAN) 0.005 % ophthalmic solution 1 drop, 1 drop, Right Eye, Nightly  LORazepam (ATIVAN) tablet 0.5 mg, 0.5 mg, Oral, Daily PRN  metoprolol succinate (TOPROL XL) extended release tablet 25 mg, 25 mg, Oral, Daily  sertraline (ZOLOFT) tablet 150 mg, 150 mg, Oral, Nightly  sodium chloride flush 0.9 % injection 5-40 mL, 5-40 mL, IntraVENous, 2 times per day  sodium chloride flush 0.9 % injection 5-40 mL, 5-40 mL, IntraVENous, PRN  0.9 % sodium chloride infusion, 25 mL, IntraVENous, PRN  enoxaparin (LOVENOX) injection 40 mg, 40 mg, SubCUTAneous, Daily  ondansetron (ZOFRAN-ODT) disintegrating tablet 4 mg, 4 mg, Oral, Q8H PRN **OR** ondansetron (ZOFRAN) injection 4 mg, 4 mg, IntraVENous, Q6H PRN  polyethylene glycol (GLYCOLAX) packet 17 g, 17 g, Oral, Daily PRN  acetaminophen (TYLENOL) tablet 650 mg, 650 mg, Oral, Q6H PRN **OR** acetaminophen (TYLENOL) suppository 650 mg, 650 mg, Rectal, Q6H PRN  losartan (COZAAR) tablet 100 mg, 100 mg, Oral, Daily **AND** hydroCHLOROthiazide (HYDRODIURIL) tablet 25 mg, 25 mg, Oral, Daily  insulin lispro (HUMALOG) injection vial 0-6 Units, 0-6 Units, SubCUTAneous, TID WC  insulin lispro (HUMALOG) injection vial 0-3 Units, 0-3 Units, SubCUTAneous, Nightly  glucose (GLUTOSE) 40 % oral gel 15 g, 15 g, Oral, PRN  dextrose 50 % IV solution, 12.5 g, IntraVENous, PRN  glucagon (rDNA) injection 1 mg, 1 mg, IntraMUSCular, PRN  dextrose 5 % solution, 100 mL/hr, IntraVENous, PRN  sulfamethoxazole-trimethoprim (BACTRIM DS;SEPTRA DS) 800-160 MG per tablet 1 tablet, 1 tablet, Oral, BID  QUEtiapine (SEROQUEL) tablet 50 mg, 50 mg, Oral, Nightly  QUEtiapine (SEROQUEL) tablet 25 mg, 25 mg, Oral, Daily    ASSESSMENT AND PLAN    PRIMARY PSYCH DIAGNOSIS: panic disorder without agoraphobia    Active Problems:    HTN (hypertension)    Morbidly obese (HCC)    Major depressive disorder, recurrent, moderate ([facility] [address]    Anxiety    Panic attack  Resolved Problems:    * No resolved hospital problems. *       1. Patient s symptoms better. OK to discharge from my perspective. Will need Rx for the new dose of Seroquel  2. Probable discharge today   3. Discharge planning is complete  4 Suicidal ideation is denies suicidal ideation  5 total time 40 minutes more than 50% was spent in direct time with the patient    Bernice Padilla MD

## 2021-11-05 NOTE — DISCHARGE SUMMARY
Hospital Medicine Discharge Summary    Patient ID: Adanamaria Garcia      Patient's PCP: SHEY Bolton CNP    Admit Date: 11/4/2021     Discharge Date:   11/05/21     Admitting Physician: Aleyda Conde MD     Discharge Physician: Beth Glaser MD     Discharge Diagnoses: Active Hospital Problems    Diagnosis     Panic attack [F41.0]     Anxiety [F41.9]     Major depressive disorder, recurrent, moderate (HCC) [F33.1]     Morbidly obese ([address] [E66.01]     HTN (hypertension) [I10]        The patient was seen and examined on day of discharge and this discharge summary is in conjunction with any daily progress note from day of discharge. Hospital Course:  67 Y F presented last night with anxiety, was diagnosed with a panic attack, and was placed in observation under hospital medicine overnight (?). Psychiatry saw her and adjusted her quetiapine dose. She is now ready for discharge. Emotionally stable. Physical Exam Performed:     /78   Pulse 82   Temp 97.7 °F (36.5 °C) (Oral)   Resp 15   Ht 5' (1.524 m)   Wt 200 lb 3.2 oz (90.8 kg)   LMP  (LMP Unknown)   SpO2 95%   BMI 39.10 kg/m²       General appearance:  No apparent distress, appears stated age and cooperative. HEENT:  Normal cephalic, atraumatic without obvious deformity. Pupils equal, round, and reactive to light. Extra ocular muscles intact. Conjunctivae/corneas clear. Neck: Supple, with full range of motion. No jugular venous distention. Trachea midline. Respiratory:  Normal respiratory effort. Clear to auscultation, bilaterally without Rales/Wheezes/Rhonchi. Cardiovascular:  Regular rate and rhythm with normal S1/S2 without murmurs, rubs or gallops. Abdomen: Soft, non-tender, non-distended with normal bowel sounds. Musculoskeletal:  No clubbing, cyanosis or edema bilaterally. Full range of motion without deformity.   Skin: Skin color, texture, turgor normal.  No rashes or lesions. Neurologic:  Neurovascularly intact without any focal sensory/motor deficits. Cranial nerves: II-XII intact, grossly non-focal.  Psychiatric:  Alert and oriented, thought content appropriate, normal insight  Capillary Refill: Brisk,< 3 seconds   Peripheral Pulses: +2 palpable, equal bilaterally       Labs: For convenience and continuity at follow-up the following most recent labs are provided:      CBC:    Lab Results   Component Value Date    WBC 6.1 11/05/2021    HGB 12.5 11/05/2021    HCT 36.7 11/05/2021     11/05/2021       Renal:    Lab Results   Component Value Date     11/05/2021    K 3.8 11/05/2021     11/05/2021    CO2 29 11/05/2021    BUN 15 11/05/2021    CREATININE 0.9 11/05/2021    CALCIUM 9.4 11/05/2021    PHOS 3.7 08/20/2018         Significant Diagnostic Studies    Radiology:   XR CHEST (2 VW)   Final Result   No acute process. Consults:     IP CONSULT TO PSYCHIATRY  IP CONSULT TO PSYCHIATRY    Disposition:  home     Condition at Discharge: Stable    Discharge Instructions/Follow-up:  Follow up with PCP within 1-2 weeks. Code Status:  Full Code     Activity: activity as tolerated    Diet: regular diet      Discharge Medications:     Current Discharge Medication List           Details   !! QUEtiapine (SEROQUEL) 50 MG tablet Take 1 tablet by mouth nightly  Qty: 60 tablet, Refills: 3      !! QUEtiapine (SEROQUEL) 25 MG tablet Take 1 tablet by mouth daily  Qty: 60 tablet, Refills: 3       !! - Potential duplicate medications found. Please discuss with provider.            Details   sulfamethoxazole-trimethoprim (BACTRIM DS;SEPTRA DS) 800-160 MG per tablet Take 1 tablet by mouth 2 times daily for 5 days  Qty: 10 tablet, Refills: 0    Associated Diagnoses: Urinary tract infection with hematuria, site unspecified      !! metoprolol succinate (TOPROL XL) 100 MG extended release tablet TAKE ONE TABLET BY MOUTH DAILY, TAKE WITH 25MG DOSE FOR A TOTAL OF 125MG

## 2021-11-07 DIAGNOSIS — I10 ESSENTIAL HYPERTENSION: ICD-10-CM

## 2021-11-08 RX ORDER — AMLODIPINE BESYLATE 5 MG/1
TABLET ORAL
Qty: 30 TABLET | Refills: 5 | Status: SHIPPED
Start: 2021-11-08 | End: 2022-04-06 | Stop reason: DRUGHIGH

## 2021-11-12 ENCOUNTER — OFFICE VISIT (OUTPATIENT)
Dept: FAMILY MEDICINE CLINIC | Age: 76
End: 2021-11-12
Payer: MEDICARE

## 2021-11-12 VITALS
HEIGHT: 60 IN | HEART RATE: 95 BPM | WEIGHT: 191 LBS | BODY MASS INDEX: 37.5 KG/M2 | SYSTOLIC BLOOD PRESSURE: 128 MMHG | DIASTOLIC BLOOD PRESSURE: 82 MMHG | OXYGEN SATURATION: 98 %

## 2021-11-12 DIAGNOSIS — E83.42 HYPOMAGNESEMIA: ICD-10-CM

## 2021-11-12 DIAGNOSIS — R53.1 WEAKNESS: ICD-10-CM

## 2021-11-12 DIAGNOSIS — E87.6 HYPOKALEMIA: ICD-10-CM

## 2021-11-12 DIAGNOSIS — R73.03 PREDIABETES: ICD-10-CM

## 2021-11-12 DIAGNOSIS — I10 PRIMARY HYPERTENSION: ICD-10-CM

## 2021-11-12 DIAGNOSIS — F41.9 ANXIETY: ICD-10-CM

## 2021-11-12 DIAGNOSIS — F41.0 PANIC DISORDER: ICD-10-CM

## 2021-11-12 DIAGNOSIS — Z09 HOSPITAL DISCHARGE FOLLOW-UP: Primary | ICD-10-CM

## 2021-11-12 LAB
FOLATE: >20 NG/ML (ref 4.78–24.2)
MAGNESIUM: 1.8 MG/DL (ref 1.8–2.4)
POTASSIUM SERPL-SCNC: 3.6 MMOL/L (ref 3.5–5.1)
TSH REFLEX FT4: 1.27 UIU/ML (ref 0.27–4.2)
VITAMIN B-12: 1164 PG/ML (ref 211–911)

## 2021-11-12 PROCEDURE — G8484 FLU IMMUNIZE NO ADMIN: HCPCS | Performed by: NURSE PRACTITIONER

## 2021-11-12 PROCEDURE — G8399 PT W/DXA RESULTS DOCUMENT: HCPCS | Performed by: NURSE PRACTITIONER

## 2021-11-12 PROCEDURE — 99214 OFFICE O/P EST MOD 30 MIN: CPT | Performed by: NURSE PRACTITIONER

## 2021-11-12 PROCEDURE — 1090F PRES/ABSN URINE INCON ASSESS: CPT | Performed by: NURSE PRACTITIONER

## 2021-11-12 PROCEDURE — 1036F TOBACCO NON-USER: CPT | Performed by: NURSE PRACTITIONER

## 2021-11-12 PROCEDURE — 1123F ACP DISCUSS/DSCN MKR DOCD: CPT | Performed by: NURSE PRACTITIONER

## 2021-11-12 PROCEDURE — 4040F PNEUMOC VAC/ADMIN/RCVD: CPT | Performed by: NURSE PRACTITIONER

## 2021-11-12 PROCEDURE — G8417 CALC BMI ABV UP PARAM F/U: HCPCS | Performed by: NURSE PRACTITIONER

## 2021-11-12 PROCEDURE — 36415 COLL VENOUS BLD VENIPUNCTURE: CPT | Performed by: NURSE PRACTITIONER

## 2021-11-12 PROCEDURE — G8427 DOCREV CUR MEDS BY ELIG CLIN: HCPCS | Performed by: NURSE PRACTITIONER

## 2021-11-12 PROCEDURE — 1111F DSCHRG MED/CURRENT MED MERGE: CPT | Performed by: NURSE PRACTITIONER

## 2021-11-12 RX ORDER — LORAZEPAM 0.5 MG/1
0.5 TABLET ORAL 2 TIMES DAILY PRN
Qty: 60 TABLET | Refills: 2 | Status: SHIPPED | OUTPATIENT
Start: 2021-11-12 | End: 2021-12-12

## 2021-11-12 ASSESSMENT — ENCOUNTER SYMPTOMS
GASTROINTESTINAL NEGATIVE: 1
RESPIRATORY NEGATIVE: 1

## 2021-11-12 NOTE — PROGRESS NOTES
Post-Discharge Transitional Care Management Services or Hospital Follow Up      Gin Ortega   YOB: 1945    Date of Office Visit:  11/12/2021  Date of Hospital Admission: 11/4/21  Date of Hospital Discharge: 11/5/21  Readmission Risk Score(high >=14%. Medium >=10%):No data recorded    Care management risk score Rising risk (score 2-5) and Complex Care (Scores >=6): 1     Non face to face  following discharge, date last encounter closed (first attempt may have been earlier): *No documented post hospital discharge outreach found in the last 14 days *No documented post hospital discharge outreach found in the last 14 days    Call initiated 2 business days of discharge: *No response recorded in the last 14 days     Patient Active Problem List   Diagnosis    HTN (hypertension)    Fibromyalgia    Prediabetes    Primary localized osteoarthrosis, lower leg    History of total knee arthroplasty, bilateral    Glaucoma    Sebaceous cyst of skin of right breast    TANIKA (stress urinary incontinence, female)    Morbidly obese ([address]    Major depressive disorder, recurrent, moderate (HCC)    Anxiety    Panic attack       Allergies   Allergen Reactions    Norco [Hydrocodone-Acetaminophen] Nausea Only    Ace Inhibitors Rash     Coughing and itching    Tape [Adhesive Tape] Rash       Medications listed as ordered at the time of discharge from hospital  Yes, stable    Medications marked \"taking\" at this time  Outpatient Medications Marked as Taking for the 11/12/21 encounter (Office Visit) with SHEY Keen CNP   Medication Sig Dispense Refill    LORazepam (ATIVAN) 0.5 MG tablet Take 1 tablet by mouth 2 times daily as needed for Anxiety for up to 30 days.  60 tablet 2    amLODIPine (NORVASC) 5 MG tablet TAKE ONE TABLET BY MOUTH DAILY 30 tablet 5    metoprolol succinate (TOPROL XL) 100 MG extended release tablet TAKE ONE TABLET BY MOUTH DAILY, TAKE WITH 25MG DOSE FOR A TOTAL OF 125MG DAILY 30 tablet 3    metoprolol succinate (TOPROL XL) 25 MG extended release tablet TAKE ONE TABLET BY MOUTH DAILY 30 tablet 3    metFORMIN (GLUCOPHAGE) 500 MG tablet TAKE ONE TABLET BY MOUTH TWICE A DAY WITH MEALS 60 tablet 5    atorvastatin (LIPITOR) 20 MG tablet TAKE ONE TABLET BY MOUTH DAILY 30 tablet 5    losartan-hydroCHLOROthiazide (HYZAAR) 100-25 MG per tablet TAKE ONE TABLET BY MOUTH DAILY 30 tablet 5    dorzolamide-timolol 22.3-6.8 MG/ML SOLN Apply 1 drop to eye 2 times daily USE DROPS ON RIGHT EYE ONLY BID.  sertraline (ZOLOFT) 100 MG tablet Take 1.5 tablets by mouth nightly 135 tablet 1    aspirin 81 MG tablet Take 1 tablet by mouth daily 90 tablet 1    Multiple Vitamins-Minerals (MULTIVITAL PO) Take  by mouth. Medications patient taking as of now reconciled against medications ordered at time of hospital discharge: Yes    Chief Complaint   Patient presents with   [address] from Hospital     AM dc'd 11/5/21, Anxiery and Depression. Lab levels low. HPI   Patient presents today for a hospital follow-up. Patient was admitted last week for observation and diagnosed with an anxiety attack. Patient's  states she is still been very anxious but they were able to get her out of the house today for this appointment. They have been giving her Ativan as needed. He is requesting a refill for the Ativan today. Patient does follow with a psychiatrist but they are not comfortable filling the Ativan and have actually taken her completely off the Seroquel for a couple weeks and will follow up again in 2 weeks. Patient's  is asking for referral to home health at [facility] home care. They are requesting help with bathing hair personal hygiene. Patient also states she feels very weak and I feel would benefit from physical and occupational therapy.   Patient has lots of concerns about her medications and her  usually helps set those up for her to make sure that they are accurate. While in the hospital she was dehydrated and found to have low magnesium and low potassium levels. We will recheck those today. Family is also requesting that we recheck her thyroid level. Family is also concerned that she has lost 9 pounds in the past week likely due to her anxiety. Inpatient course: Discharge summary reviewed- see chart. Interval history/Current status: stable    Review of Systems   Constitutional: Negative. HENT: Negative. Respiratory: Negative. Cardiovascular: Negative. Gastrointestinal: Negative. Musculoskeletal: Negative. Skin: Negative. Neurological: Negative. Psychiatric/Behavioral: The patient is nervous/anxious. Vitals:    11/12/21 1256   BP: 128/82   Pulse: 95   SpO2: 98%   Weight: 191 lb (86.6 kg)   Height: 5' (1.524 m)     Body mass index is 37.3 kg/m². Wt Readings from Last 3 Encounters:   11/12/21 191 lb (86.6 kg)   11/05/21 200 lb 3.2 oz (90.8 kg)   03/11/21 199 lb 9.6 oz (90.5 kg)     BP Readings from Last 3 Encounters:   11/12/21 128/82   11/05/21 136/78   03/11/21 138/78       Physical Exam  Vitals reviewed. Cardiovascular:      Rate and Rhythm: Normal rate and regular rhythm. Heart sounds: Normal heart sounds. Pulmonary:      Effort: Pulmonary effort is normal.      Breath sounds: Normal breath sounds. Skin:     General: Skin is warm and dry. Neurological:      Mental Status: She is alert and oriented to person, place, and time. Assessment/Plan:  1. Hospital discharge follow-up  - TX DISCHARGE MEDS RECONCILED W/ CURRENT OUTPATIENT MED LIST    2. Anxiety  She will continue follow-up with Dr. Js Santacruz with psychiatry and I will also refill her Ativan twice a day as needed. We also given a referral to home health and will be checking blood work today.  - TSH WITH REFLEX TO FT4  - VITAMIN B12 & FOLATE  - LORazepam (ATIVAN) 0.5 MG tablet;  Take 1 tablet by mouth 2 times daily as needed for Anxiety for up to 30 days. Dispense: 60 tablet; Refill: 2  - External Referral To Home Health    3. Prediabetes  - External Referral To Home Health    4. Primary hypertension  Blood pressure stable today we will check thyroid and refer to home health. - TSH WITH REFLEX TO FT4  - External Referral To Home Health    5. Panic disorder  Referral given to home health to help with daily personal hygiene. She has Ativan on hand as needed and will follow up with Dr. Josh Moseley. We will also continue the Zoloft 150 daily. - VITAMIN B12 & FOLATE  - External Referral To Home Health    6. Hypomagnesemia  - Magnesium    7. Hypokalemia  - POTASSIUM    8. Weakness  Referral given for home health would benefit from physical therapy and Occupational Therapy.         Medical Decision Making: moderate complexity

## 2021-11-17 DIAGNOSIS — I10 ESSENTIAL HYPERTENSION: ICD-10-CM

## 2021-11-17 RX ORDER — LOSARTAN POTASSIUM AND HYDROCHLOROTHIAZIDE 25; 100 MG/1; MG/1
TABLET ORAL
Qty: 30 TABLET | Refills: 5 | Status: SHIPPED | OUTPATIENT
Start: 2021-11-17 | End: 2022-04-06 | Stop reason: SDUPTHER

## 2021-11-17 NOTE — TELEPHONE ENCOUNTER
Last Office Visit  -    Next Office Visit  -  11/18/21    Last Filled  -    Last UDS -    Contract -

## 2021-11-18 ENCOUNTER — VIRTUAL VISIT (OUTPATIENT)
Dept: FAMILY MEDICINE CLINIC | Age: 76
End: 2021-11-18
Payer: MEDICARE

## 2021-11-18 DIAGNOSIS — Z00.00 ROUTINE GENERAL MEDICAL EXAMINATION AT A HEALTH CARE FACILITY: Primary | ICD-10-CM

## 2021-11-18 PROCEDURE — 1123F ACP DISCUSS/DSCN MKR DOCD: CPT | Performed by: NURSE PRACTITIONER

## 2021-11-18 PROCEDURE — G8484 FLU IMMUNIZE NO ADMIN: HCPCS | Performed by: NURSE PRACTITIONER

## 2021-11-18 PROCEDURE — G0439 PPPS, SUBSEQ VISIT: HCPCS | Performed by: NURSE PRACTITIONER

## 2021-11-18 PROCEDURE — 4040F PNEUMOC VAC/ADMIN/RCVD: CPT | Performed by: NURSE PRACTITIONER

## 2021-11-18 RX ORDER — TIMOLOL MALEATE 5 MG/ML
1 SOLUTION/ DROPS OPHTHALMIC 2 TIMES DAILY
COMMUNITY
End: 2021-11-18

## 2021-11-18 RX ORDER — LATANOPROST 50 UG/ML
1 SOLUTION/ DROPS OPHTHALMIC NIGHTLY
COMMUNITY

## 2021-11-18 ASSESSMENT — PATIENT HEALTH QUESTIONNAIRE - PHQ9
1. LITTLE INTEREST OR PLEASURE IN DOING THINGS: 1
6. FEELING BAD ABOUT YOURSELF - OR THAT YOU ARE A FAILURE OR HAVE LET YOURSELF OR YOUR FAMILY DOWN: 0
10. IF YOU CHECKED OFF ANY PROBLEMS, HOW DIFFICULT HAVE THESE PROBLEMS MADE IT FOR YOU TO DO YOUR WORK, TAKE CARE OF THINGS AT HOME, OR GET ALONG WITH OTHER PEOPLE: 2
7. TROUBLE CONCENTRATING ON THINGS, SUCH AS READING THE NEWSPAPER OR WATCHING TELEVISION: 3
4. FEELING TIRED OR HAVING LITTLE ENERGY: 3
5. POOR APPETITE OR OVEREATING: 0
9. THOUGHTS THAT YOU WOULD BE BETTER OFF DEAD, OR OF HURTING YOURSELF: 0
SUM OF ALL RESPONSES TO PHQ9 QUESTIONS 1 & 2: 4
SUM OF ALL RESPONSES TO PHQ QUESTIONS 1-9: 15
3. TROUBLE FALLING OR STAYING ASLEEP: 3
SUM OF ALL RESPONSES TO PHQ QUESTIONS 1-9: 15
2. FEELING DOWN, DEPRESSED OR HOPELESS: 3
SUM OF ALL RESPONSES TO PHQ QUESTIONS 1-9: 15
8. MOVING OR SPEAKING SO SLOWLY THAT OTHER PEOPLE COULD HAVE NOTICED. OR THE OPPOSITE, BEING SO FIGETY OR RESTLESS THAT YOU HAVE BEEN MOVING AROUND A LOT MORE THAN USUAL: 2

## 2021-11-18 ASSESSMENT — LIFESTYLE VARIABLES: HOW OFTEN DO YOU HAVE A DRINK CONTAINING ALCOHOL: 0

## 2021-11-18 NOTE — PROGRESS NOTES
MG tablet Take 1.5 tablets by mouth nightly Yes Stas Espino MD   aspirin 81 MG tablet Take 1 tablet by mouth daily Yes Stas Espino MD   Multiple Vitamins-Minerals (MULTIVITAL PO) Take  by mouth.  Yes Historical Provider, MD   QUEtiapine (SEROQUEL) 50 MG tablet Take 1 tablet by mouth nightly  Patient not taking: Reported on 11/12/2021  Jeniffer Plummer MD   QUEtiapine (SEROQUEL) 25 MG tablet Take 1 tablet by mouth daily  Patient not taking: Reported on 11/12/2021  Jeniffer Plummer MD         Past Medical History:   Diagnosis Date    Anxiety     Arthritis     Cancer [facility])     endometria    Depression     Diabetes mellitus ([facility] [address]     pre diabetic but take medications    Diverticulitis of colon 12/2012    possible    Fibromyalgia     Glaucoma     Heart murmur     Hyperlipidemia     not currently taking meds    Hypertension     Occult blood in stools     Osteopenia     Prediabetes 2/20/2013    Urinary incontinence        Past Surgical History:   Procedure Laterality Date    CATARACT REMOVAL WITH IMPLANT Left t    COLONOSCOPY  12/03/02 01/18/13    hemorrhoids, diverticulosis    COLONOSCOPY  2/26/16    normal    CYST REMOVAL Right 08/24/2018    removal rifh chest wall sebacious cyst    CYSTOSCOPY N/A 02/15/2019    RIGID CYSTOSCOPY, MID-URETHRAL SLING PLACEMENT     DILATION AND CURETTAGE OF UTERUS      EYE SURGERY Right 8/16/13    phaco with IOL    EYE SURGERY Left 2013    cataract removal with implant    HEMORRHOID SURGERY      HYSTERECTOMY      JOINT REPLACEMENT Left 2010    knee    JOINT REPLACEMENT Right 09/14/2016    KNEE ARTHROPLASTY      LT    KNEE SURGERY      NE EXC SKIN BENIG <5MM REMAINDR BODY N/A 8/24/2018    REMOVAL OF SEBACEOUS CYST - CHEST WALL performed by Marcel Whitmore MD at [facility] N/A 2/15/2019    RIGID CYSTOSCOPY, MID-URETHRAL SLING PLACEMENT performed by Crystal Uriarte MD at [address] [place] [address]  2/26/16 support that you need?: Yes  Do you have a Living Will?: (!) No  Advance Directives     Power of [address] Will ACP-Advance Directive ACP-Power of     Not on File Not on File Filed Not on [facility] Interventions:  · No Living Will: Patient declines ACP discussion/assistance    Health Habits/Nutrition:  Health Habits/Nutrition  Do you exercise for at least 20 minutes 2-3 times per week?: (!) No  Have you lost any weight without trying in the past 3 months?: (!) Yes  Do you eat only one meal per day?: No  Have you seen the dentist within the past year?: Yes     Health Habits/Nutrition Interventions:  · Inadequate physical activity:  will try to increase exercise. Hearing/Vision:  No exam data present  Hearing/Vision  Do you or your family notice any trouble with your hearing that hasn't been managed with hearing aids?: (!) Yes  Do you have difficulty driving, watching TV, or doing any of your daily activities because of your eyesight?: No  Have you had an eye exam within the past year?: Yes  Hearing/Vision Interventions:  · Hearing concerns:  patient declines any further evaluation/treatment for hearing issues    Safety:  Safety  Do you have working smoke detectors?: Yes  Have all throw rugs been removed or fastened?: (!) No  Do you have non-slip mats or surfaces in all bathtubs/showers?: Yes  Do all of your stairways have a railing or banister?: Yes  Are your doorways, halls and stairs free of clutter?: Yes  Do you always fasten your seatbelt when you are in a car?: Yes  Safety Interventions:  · Home safety tips provided    ADL:  ADLs  In the past 7 days, did you need help from others to perform any of the following everyday activities? Eating, dressing, grooming, bathing, toileting, or walking/balance?: (!) Bathing  In the past 7 days, did you need help from others to take care of any of the following?  Laundry, housekeeping, banking/finances, shopping, telephone use, food preparation, transportation, or taking medications?: (!) Taking Medications, Transportation, Food Preparation, Telephone Use, Banking/Finances, Shopping, Housekeeping, Laundry  ADL Interventions:  · Referred for [address]    Personalized Preventive Plan   Current Health Maintenance Status  Immunization History   Administered Date(s) Administered    COVID-19, Hemant Haney, Primary or Immunocompromised, PF, 100mcg/0.5mL 01/27/2021, 02/24/2021    Influenza Vaccine, unspecified formulation 11/01/2016    Influenza, High Dose (Fluzone 65 yrs and older) 09/25/2015, 10/20/2019    Influenza, High-dose, Quadv, 65 yrs +, IM (Fluzone) 09/25/2020    Pneumococcal Conjugate 13-valent ([id number]) 09/25/2015    Pneumococcal Polysaccharide ([id number]) 02/20/2013    Tdap (Boostrix, Adacel) 04/14/2008, 06/13/2018    Zoster Live (Zostavax) 06/12/2012    Zoster Recombinant (Shingrix) 08/27/2018, 10/27/2018        Health Maintenance   Topic Date Due    Hepatitis C screen  Never done    Lipid screen  05/08/2021    COVID-19 Vaccine (3 - Booster for Hemant Letcher series) 08/24/2021    Annual Wellness Visit (AWV)  11/14/2021    Creatinine monitoring  11/05/2022    Potassium monitoring  11/12/2022    DTaP/Tdap/Td vaccine (3 - Td or Tdap) 06/13/2028    DEXA (modify frequency per FRAX score)  Completed    Flu vaccine  Completed    Shingles Vaccine  Completed    Pneumococcal 65+ years Vaccine  Completed    Hepatitis A vaccine  Aged Out    Hepatitis B vaccine  Aged Out    Hib vaccine  Aged Out    Meningococcal (ACWY) vaccine  Aged Out     Recommendations for Forerun Due: see orders and patient instructions/AVS.  . Recommended screening schedule for the next 5-10 years is provided to the patient in written form: see Patient Yarelis Mata was seen today for medicare awv.     Diagnoses and all orders for this visit:    Routine general medical examination at a health care facility  Patient still struggling with anxiety. Will be having home care come out and help with ADLs and physical therapy. Follow up with psychiatry next week. Follow up with me in 3 months. Farzad Rivera is a 67 y.o. female being evaluated by a Virtual Visit (phone) encounter to address concerns as mentioned above. A caregiver was present when appropriate. Due to this being a TeleHealth encounter (During EY-11 public health emergency), evaluation of the following organ systems was limited: Vitals/Constitutional/EENT/Resp/CV/GI//MS/Neuro/Skin/Heme-Lymph-Imm. Pursuant to the emergency declaration under the [address] [place] [address], [address] [place] [address] authority and the [employer] Act, this Virtual Visit was conducted with patient's (and/or legal guardian's) consent, to reduce the patient's risk of exposure to COVID-19 and provide necessary medical care. The patient (and/or legal guardian) has also been advised to contact this office for worsening conditions or problems, and seek emergency medical treatment and/or call 911 if deemed necessary. Patient identification was verified at the start of the visit: Yes    Services were provided through phone to substitute for in-person clinic visit. Patient and provider were located at their individual homes. --SHEY Narvaez CNP on 11/18/2021 at 2:05 PM    An electronic signature was used to authenticate this note.

## 2021-11-18 NOTE — PATIENT INSTRUCTIONS
Personalized Preventive Plan for Angel Alfaro - 11/18/2021  Medicare offers a range of preventive health benefits. Some of the tests and screenings are paid in full while other may be subject to a deductible, co-insurance, and/or copay. Some of these benefits include a comprehensive review of your medical history including lifestyle, illnesses that may run in your family, and various assessments and screenings as appropriate. After reviewing your medical record and screening and assessments performed today your provider may have ordered immunizations, labs, imaging, and/or referrals for you. A list of these orders (if applicable) as well as your Preventive Care list are included within your After Visit Summary for your review. Other Preventive Recommendations:    · A preventive eye exam performed by an eye specialist is recommended every 1-2 years to screen for glaucoma; cataracts, macular degeneration, and other eye disorders. · A preventive dental visit is recommended every 6 months. · Try to get at least 150 minutes of exercise per week or 10,000 steps per day on a pedometer . · Order or download the FREE \"Exercise & Physical Activity: Your Everyday Guide\" from The [facility]. Call 0-162.472.9729 or search The [facility] online. · You need 0466-2071 mg of calcium and 6622-5936 IU of vitamin D per day. It is possible to meet your calcium requirement with diet alone, but a vitamin D supplement is usually necessary to meet this goal.  · When exposed to the sun, use a sunscreen that protects against both UVA and UVB radiation with an SPF of 30 or greater. Reapply every 2 to 3 hours or after sweating, drying off with a towel, or swimming. · Always wear a seat belt when traveling in a car. Always wear a helmet when riding a bicycle or motorcycle.

## 2021-12-02 ENCOUNTER — TELEPHONE (OUTPATIENT)
Dept: FAMILY MEDICINE CLINIC | Age: 76
End: 2021-12-02

## 2021-12-02 NOTE — TELEPHONE ENCOUNTER
The appt is Monday with Dr Lizzette Juarez, pt was given seroquel while she was at the hospital by the hospital psychiatrist. A 50 mg and 25 mg dose.  states that pt has not been taking it, but she used to take it to help with her sleep.  is ok with her starting that again at the lower dose. Please advise.

## 2021-12-02 NOTE — TELEPHONE ENCOUNTER
----- Message from Eugene Leavitt sent at 12/2/2021  9:09 AM EST -----  Subject: Message to Provider    QUESTIONS  Information for Provider? pt  called asking what he could get for   his wife to help sleep that will not interact with her current meds. Please call him back  ---------------------------------------------------------------------------  --------------  CALL BACK INFO  What is the best way for the office to contact you? OK to leave message on   voicemail  Preferred Call Back Phone Number? [phone]  ---------------------------------------------------------------------------  --------------  SCRIPT ANSWERS  Relationship to Patient? Other  Representative Name? Manford Denver   Is the Representative on the appropriate HIPAA document in Epic?  Yes

## 2021-12-02 NOTE — TELEPHONE ENCOUNTER
Has she had another appointment with Dr. Mervat Francis yet? Is he okay with her going back on Seroquel? It should help with sleep.

## 2021-12-02 NOTE — TELEPHONE ENCOUNTER
I suggest starting her at the 25 mg of the Seroquel nightly. Let me know if a refill is needed to start this.

## 2021-12-03 ENCOUNTER — TELEPHONE (OUTPATIENT)
Dept: FAMILY MEDICINE CLINIC | Age: 76
End: 2021-12-03

## 2021-12-03 NOTE — TELEPHONE ENCOUNTER
Patient's  would like a referral to a phycologist from [facility] for his wife.   Please call him   [phone]

## 2021-12-03 NOTE — TELEPHONE ENCOUNTER
Pt  notified. \"He keeps questioning that psychiatrists have 2 fields, one treats medically and one gets to the route of the problems\". The pt has appt with Dr Lydia Bender on Monday 12/6/21. He is the psych that pt is currently seeing now per [facility]. Pt  is okay with Sharon Rand calling him to discuss this. He is aware it will be Monday.

## 2021-12-06 NOTE — TELEPHONE ENCOUNTER
I spoke to patients  which stated that moving forward they will have Neurological testing done for [facility] as ordered by Fermín Adams. There is currently a plan in session but, will probably be needing Ximena's services in the near future. They were thankful for the call.

## 2021-12-15 ENCOUNTER — TELEPHONE (OUTPATIENT)
Dept: FAMILY MEDICINE CLINIC | Age: 76
End: 2021-12-15

## 2021-12-15 ENCOUNTER — CARE COORDINATION (OUTPATIENT)
Dept: CARE COORDINATION | Age: 76
End: 2021-12-15

## 2021-12-15 NOTE — CARE COORDINATION
ACM made outreach to patient  Susie Kauffman. ACM explained to Susie Kauffman what role was in the office and why patients chart was sent to [facility]. Susie Kauffman said he is looking for SNF and for temporary placement to help build strength of patient. Susie Kauffman did inform ACM that he has an appt with Ximena for evaluation of patient to potentially place her in [facility] this appt time is for 2pm. ACM said she would contact Susie Kauffman sometime on Friday to discuss further options of care for patient. Susie Kauffman agreed with this plan.  ACM will inform MIKE Carney

## 2021-12-15 NOTE — TELEPHONE ENCOUNTER
Received call from patient's spouse stating that wife Pt has become dehydrated and isn't eating well. Pt's depression and anxiety has become worse in the last few weeks. Pt's  is requesting a short term care/rehabilitation facility for Pt.      Please advise

## 2021-12-15 NOTE — TELEPHONE ENCOUNTER
Eddie Raymundo are you able to assist with this? I dont think they need an order from me. They should be able to contact the facility they want to go to.

## 2021-12-15 NOTE — TELEPHONE ENCOUNTER
I will make an outreach to the family, but if they are wanting Skilled days to go through insurance it will be a different process vs. Patient being a long term resident. If family wants skilled we will need an order and a PT assessment completed but I will confirm what family is wanting.

## 2021-12-16 ENCOUNTER — TELEPHONE (OUTPATIENT)
Dept: FAMILY MEDICINE CLINIC | Age: 76
End: 2021-12-16

## 2021-12-16 ENCOUNTER — HOSPITAL ENCOUNTER (EMERGENCY)
Age: 76
Discharge: PSYCHIATRIC OR SUBSTANCE USE FACILITY | End: 2021-12-16
Attending: EMERGENCY MEDICINE
Payer: MEDICARE

## 2021-12-16 ENCOUNTER — HOSPITAL ENCOUNTER (INPATIENT)
Age: 76
LOS: 14 days | Discharge: HOME | DRG: 880 | End: 2021-12-30
Attending: PSYCHIATRY & NEUROLOGY | Admitting: PSYCHIATRY & NEUROLOGY
Payer: MEDICARE

## 2021-12-16 VITALS
WEIGHT: 191 LBS | BODY MASS INDEX: 37.5 KG/M2 | TEMPERATURE: 99.4 F | RESPIRATION RATE: 16 BRPM | DIASTOLIC BLOOD PRESSURE: 65 MMHG | HEART RATE: 66 BPM | SYSTOLIC BLOOD PRESSURE: 150 MMHG | OXYGEN SATURATION: 97 % | HEIGHT: 60 IN

## 2021-12-16 DIAGNOSIS — F41.1 GENERALIZED ANXIETY DISORDER: Primary | ICD-10-CM

## 2021-12-16 DIAGNOSIS — F41.1 ANXIETY STATE: Primary | ICD-10-CM

## 2021-12-16 DIAGNOSIS — E87.6 HYPOKALEMIA: ICD-10-CM

## 2021-12-16 PROBLEM — F32.9 MAJOR DEPRESSION, SINGLE EPISODE: Status: ACTIVE | Noted: 2021-12-16

## 2021-12-16 LAB
A/G RATIO: 1.2 (ref 1.1–2.2)
ACETAMINOPHEN LEVEL: <5 UG/ML (ref 10–30)
ALBUMIN SERPL-MCNC: 3.9 G/DL (ref 3.4–5)
ALP BLD-CCNC: 63 U/L (ref 40–129)
ALT SERPL-CCNC: 15 U/L (ref 10–40)
ANION GAP SERPL CALCULATED.3IONS-SCNC: 15 MMOL/L (ref 3–16)
AST SERPL-CCNC: 22 U/L (ref 15–37)
BACTERIA: ABNORMAL /HPF
BASOPHILS ABSOLUTE: 0.1 K/UL (ref 0–0.2)
BASOPHILS RELATIVE PERCENT: 1.5 %
BILIRUB SERPL-MCNC: 1 MG/DL (ref 0–1)
BILIRUBIN URINE: ABNORMAL
BLOOD, URINE: NEGATIVE
BUN BLDV-MCNC: 27 MG/DL (ref 7–20)
CALCIUM SERPL-MCNC: 10.6 MG/DL (ref 8.3–10.6)
CHLORIDE BLD-SCNC: 98 MMOL/L (ref 99–110)
CLARITY: CLEAR
CO2: 25 MMOL/L (ref 21–32)
COLOR: YELLOW
CREAT SERPL-MCNC: 0.9 MG/DL (ref 0.6–1.2)
EKG ATRIAL RATE: 66 BPM
EKG DIAGNOSIS: NORMAL
EKG P AXIS: 57 DEGREES
EKG P-R INTERVAL: 150 MS
EKG Q-T INTERVAL: 422 MS
EKG QRS DURATION: 78 MS
EKG QTC CALCULATION (BAZETT): 442 MS
EKG R AXIS: 9 DEGREES
EKG T AXIS: -55 DEGREES
EKG VENTRICULAR RATE: 66 BPM
EOSINOPHILS ABSOLUTE: 0.1 K/UL (ref 0–0.6)
EOSINOPHILS RELATIVE PERCENT: 1.1 %
EPITHELIAL CELLS, UA: ABNORMAL /HPF (ref 0–5)
GFR AFRICAN AMERICAN: >60
GFR NON-AFRICAN AMERICAN: >60
GLUCOSE BLD-MCNC: 123 MG/DL (ref 70–99)
GLUCOSE URINE: NEGATIVE MG/DL
HCT VFR BLD CALC: 41.9 % (ref 36–48)
HEMOGLOBIN: 14.3 G/DL (ref 12–16)
HYALINE CASTS: ABNORMAL /LPF (ref 0–2)
KETONES, URINE: ABNORMAL MG/DL
LEUKOCYTE ESTERASE, URINE: ABNORMAL
LYMPHOCYTES ABSOLUTE: 1.2 K/UL (ref 1–5.1)
LYMPHOCYTES RELATIVE PERCENT: 20.8 %
MAGNESIUM: 1.6 MG/DL (ref 1.8–2.4)
MCH RBC QN AUTO: 30.7 PG (ref 26–34)
MCHC RBC AUTO-ENTMCNC: 34.1 G/DL (ref 31–36)
MCV RBC AUTO: 90 FL (ref 80–100)
MICROSCOPIC EXAMINATION: YES
MONOCYTES ABSOLUTE: 0.4 K/UL (ref 0–1.3)
MONOCYTES RELATIVE PERCENT: 8.1 %
NEUTROPHILS ABSOLUTE: 3.8 K/UL (ref 1.7–7.7)
NEUTROPHILS RELATIVE PERCENT: 68.5 %
NITRITE, URINE: NEGATIVE
PDW BLD-RTO: 13.5 % (ref 12.4–15.4)
PH UA: 6 (ref 5–8)
PLATELET # BLD: 218 K/UL (ref 135–450)
PMV BLD AUTO: 9.5 FL (ref 5–10.5)
POTASSIUM SERPL-SCNC: 2.8 MMOL/L (ref 3.5–5.1)
POTASSIUM SERPL-SCNC: 3.4 MMOL/L (ref 3.5–5.1)
PROTEIN UA: NEGATIVE MG/DL
RBC # BLD: 4.65 M/UL (ref 4–5.2)
RBC UA: ABNORMAL /HPF (ref 0–4)
RENAL EPITHELIAL, UA: ABNORMAL /HPF (ref 0–1)
SALICYLATE, SERUM: <0.3 MG/DL (ref 15–30)
SARS-COV-2, NAAT: NOT DETECTED
SODIUM BLD-SCNC: 138 MMOL/L (ref 136–145)
SPECIFIC GRAVITY UA: 1.02 (ref 1–1.03)
TOTAL PROTEIN: 7.1 G/DL (ref 6.4–8.2)
URINE TYPE: ABNORMAL
UROBILINOGEN, URINE: 0.2 E.U./DL
WBC # BLD: 5.6 K/UL (ref 4–11)
WBC UA: ABNORMAL /HPF (ref 0–5)

## 2021-12-16 PROCEDURE — 80053 COMPREHEN METABOLIC PANEL: CPT

## 2021-12-16 PROCEDURE — 84132 ASSAY OF SERUM POTASSIUM: CPT

## 2021-12-16 PROCEDURE — 80179 DRUG ASSAY SALICYLATE: CPT

## 2021-12-16 PROCEDURE — 81001 URINALYSIS AUTO W/SCOPE: CPT

## 2021-12-16 PROCEDURE — 83735 ASSAY OF MAGNESIUM: CPT

## 2021-12-16 PROCEDURE — 93005 ELECTROCARDIOGRAM TRACING: CPT | Performed by: NURSE PRACTITIONER

## 2021-12-16 PROCEDURE — 87635 SARS-COV-2 COVID-19 AMP PRB: CPT

## 2021-12-16 PROCEDURE — 93010 ELECTROCARDIOGRAM REPORT: CPT | Performed by: INTERNAL MEDICINE

## 2021-12-16 PROCEDURE — 80143 DRUG ASSAY ACETAMINOPHEN: CPT

## 2021-12-16 PROCEDURE — 6370000000 HC RX 637 (ALT 250 FOR IP): Performed by: PSYCHIATRY & NEUROLOGY

## 2021-12-16 PROCEDURE — 6360000002 HC RX W HCPCS: Performed by: NURSE PRACTITIONER

## 2021-12-16 PROCEDURE — 99285 EMERGENCY DEPT VISIT HI MDM: CPT

## 2021-12-16 PROCEDURE — 1240000000 HC EMOTIONAL WELLNESS R&B

## 2021-12-16 PROCEDURE — 85025 COMPLETE CBC W/AUTO DIFF WBC: CPT

## 2021-12-16 PROCEDURE — 6370000000 HC RX 637 (ALT 250 FOR IP): Performed by: NURSE PRACTITIONER

## 2021-12-16 PROCEDURE — 96365 THER/PROPH/DIAG IV INF INIT: CPT

## 2021-12-16 RX ORDER — HYDROXYZINE PAMOATE 50 MG/1
50 CAPSULE ORAL EVERY 6 HOURS PRN
Status: DISCONTINUED | OUTPATIENT
Start: 2021-12-16 | End: 2021-12-30 | Stop reason: HOSPADM

## 2021-12-16 RX ORDER — AMLODIPINE BESYLATE 5 MG/1
5 TABLET ORAL DAILY
Status: DISCONTINUED | OUTPATIENT
Start: 2021-12-17 | End: 2021-12-30 | Stop reason: HOSPADM

## 2021-12-16 RX ORDER — OLANZAPINE 10 MG/1
10 INJECTION, POWDER, LYOPHILIZED, FOR SOLUTION INTRAMUSCULAR
Status: DISCONTINUED | OUTPATIENT
Start: 2021-12-16 | End: 2021-12-16

## 2021-12-16 RX ORDER — ATORVASTATIN CALCIUM 10 MG/1
20 TABLET, FILM COATED ORAL NIGHTLY
Status: DISCONTINUED | OUTPATIENT
Start: 2021-12-16 | End: 2021-12-30 | Stop reason: HOSPADM

## 2021-12-16 RX ORDER — ACETAMINOPHEN 325 MG/1
650 TABLET ORAL EVERY 4 HOURS PRN
Status: DISCONTINUED | OUTPATIENT
Start: 2021-12-16 | End: 2021-12-30 | Stop reason: HOSPADM

## 2021-12-16 RX ORDER — ASPIRIN 81 MG/1
81 TABLET, CHEWABLE ORAL DAILY
Status: DISCONTINUED | OUTPATIENT
Start: 2021-12-17 | End: 2021-12-30 | Stop reason: HOSPADM

## 2021-12-16 RX ORDER — LATANOPROST 50 UG/ML
1 SOLUTION/ DROPS OPHTHALMIC NIGHTLY
Status: DISCONTINUED | OUTPATIENT
Start: 2021-12-16 | End: 2021-12-30 | Stop reason: HOSPADM

## 2021-12-16 RX ORDER — METOPROLOL SUCCINATE 25 MG/1
25 TABLET, EXTENDED RELEASE ORAL DAILY
Status: DISCONTINUED | OUTPATIENT
Start: 2021-12-17 | End: 2021-12-30 | Stop reason: HOSPADM

## 2021-12-16 RX ORDER — POTASSIUM CHLORIDE 7.45 MG/ML
10 INJECTION INTRAVENOUS ONCE
Status: COMPLETED | OUTPATIENT
Start: 2021-12-16 | End: 2021-12-16

## 2021-12-16 RX ORDER — BENZTROPINE MESYLATE 1 MG/ML
2 INJECTION INTRAMUSCULAR; INTRAVENOUS 2 TIMES DAILY PRN
Status: DISCONTINUED | OUTPATIENT
Start: 2021-12-16 | End: 2021-12-30 | Stop reason: HOSPADM

## 2021-12-16 RX ORDER — LORAZEPAM 1 MG/1
1 TABLET ORAL ONCE
Status: COMPLETED | OUTPATIENT
Start: 2021-12-16 | End: 2021-12-16

## 2021-12-16 RX ORDER — MAGNESIUM HYDROXIDE/ALUMINUM HYDROXICE/SIMETHICONE 120; 1200; 1200 MG/30ML; MG/30ML; MG/30ML
30 SUSPENSION ORAL EVERY 6 HOURS PRN
Status: DISCONTINUED | OUTPATIENT
Start: 2021-12-16 | End: 2021-12-30 | Stop reason: HOSPADM

## 2021-12-16 RX ORDER — TRAZODONE HYDROCHLORIDE 50 MG/1
50 TABLET ORAL NIGHTLY PRN
Status: DISCONTINUED | OUTPATIENT
Start: 2021-12-16 | End: 2021-12-30 | Stop reason: HOSPADM

## 2021-12-16 RX ORDER — LOSARTAN POTASSIUM 100 MG/1
100 TABLET ORAL DAILY
Status: DISCONTINUED | OUTPATIENT
Start: 2021-12-17 | End: 2021-12-30 | Stop reason: HOSPADM

## 2021-12-16 RX ORDER — HYDROCHLOROTHIAZIDE 25 MG/1
25 TABLET ORAL DAILY
Status: DISCONTINUED | OUTPATIENT
Start: 2021-12-17 | End: 2021-12-30 | Stop reason: HOSPADM

## 2021-12-16 RX ORDER — LOSARTAN POTASSIUM AND HYDROCHLOROTHIAZIDE 25; 100 MG/1; MG/1
1 TABLET ORAL DAILY
Status: DISCONTINUED | OUTPATIENT
Start: 2021-12-17 | End: 2021-12-16 | Stop reason: CLARIF

## 2021-12-16 RX ORDER — DORZOLAMIDE HYDROCHLORIDE AND TIMOLOL MALEATE 20; 5 MG/ML; MG/ML
1 SOLUTION/ DROPS OPHTHALMIC 2 TIMES DAILY
Status: DISCONTINUED | OUTPATIENT
Start: 2021-12-16 | End: 2021-12-30 | Stop reason: HOSPADM

## 2021-12-16 RX ORDER — IBUPROFEN 400 MG/1
400 TABLET ORAL EVERY 6 HOURS PRN
Status: DISCONTINUED | OUTPATIENT
Start: 2021-12-16 | End: 2021-12-30 | Stop reason: HOSPADM

## 2021-12-16 RX ORDER — METOPROLOL SUCCINATE 50 MG/1
100 TABLET, EXTENDED RELEASE ORAL DAILY
Status: DISCONTINUED | OUTPATIENT
Start: 2021-12-17 | End: 2021-12-30 | Stop reason: HOSPADM

## 2021-12-16 RX ORDER — POTASSIUM CHLORIDE 20 MEQ/1
40 TABLET, EXTENDED RELEASE ORAL ONCE
Status: COMPLETED | OUTPATIENT
Start: 2021-12-16 | End: 2021-12-16

## 2021-12-16 RX ORDER — OLANZAPINE 10 MG/1
10 INJECTION, POWDER, LYOPHILIZED, FOR SOLUTION INTRAMUSCULAR EVERY 8 HOURS PRN
Status: DISCONTINUED | OUTPATIENT
Start: 2021-12-16 | End: 2021-12-22

## 2021-12-16 RX ORDER — QUETIAPINE FUMARATE 25 MG/1
25 TABLET, FILM COATED ORAL DAILY
Status: DISCONTINUED | OUTPATIENT
Start: 2021-12-17 | End: 2021-12-23

## 2021-12-16 RX ORDER — MAGNESIUM SULFATE 1 G/100ML
1000 INJECTION INTRAVENOUS ONCE
Status: COMPLETED | OUTPATIENT
Start: 2021-12-16 | End: 2021-12-16

## 2021-12-16 RX ORDER — OLANZAPINE 10 MG/1
10 TABLET ORAL
Status: DISCONTINUED | OUTPATIENT
Start: 2021-12-16 | End: 2021-12-16

## 2021-12-16 RX ORDER — QUETIAPINE FUMARATE 25 MG/1
50 TABLET, FILM COATED ORAL NIGHTLY
Status: DISCONTINUED | OUTPATIENT
Start: 2021-12-16 | End: 2021-12-23

## 2021-12-16 RX ORDER — OLANZAPINE 10 MG/1
10 TABLET ORAL EVERY 8 HOURS PRN
Status: DISCONTINUED | OUTPATIENT
Start: 2021-12-16 | End: 2021-12-22

## 2021-12-16 RX ORDER — POLYETHYLENE GLYCOL 3350 17 G
2 POWDER IN PACKET (EA) ORAL
Status: DISCONTINUED | OUTPATIENT
Start: 2021-12-16 | End: 2021-12-30 | Stop reason: HOSPADM

## 2021-12-16 RX ADMIN — ATORVASTATIN CALCIUM 20 MG: 10 TABLET, FILM COATED ORAL at 23:23

## 2021-12-16 RX ADMIN — LATANOPROST 1 DROP: 50 SOLUTION OPHTHALMIC at 23:39

## 2021-12-16 RX ADMIN — LORAZEPAM 1 MG: 1 TABLET ORAL at 11:25

## 2021-12-16 RX ADMIN — QUETIAPINE FUMARATE 50 MG: 25 TABLET ORAL at 23:23

## 2021-12-16 RX ADMIN — MAGNESIUM SULFATE HEPTAHYDRATE 1000 MG: 1 INJECTION, SOLUTION INTRAVENOUS at 15:40

## 2021-12-16 RX ADMIN — SERTRALINE 150 MG: 100 TABLET, FILM COATED ORAL at 23:23

## 2021-12-16 RX ADMIN — DORZOLAMIDE HYDROCHLORIDE AND TIMOLOL MALEATE 1 DROP: 20; 5 SOLUTION/ DROPS OPHTHALMIC at 23:39

## 2021-12-16 RX ADMIN — POTASSIUM CHLORIDE 40 MEQ: 1500 TABLET, EXTENDED RELEASE ORAL at 14:17

## 2021-12-16 RX ADMIN — POTASSIUM CHLORIDE 10 MEQ: 10 INJECTION, SOLUTION INTRAVENOUS at 14:19

## 2021-12-16 ASSESSMENT — SLEEP AND FATIGUE QUESTIONNAIRES
DIFFICULTY ARISING: NO
DIFFICULTY STAYING ASLEEP: NO
DO YOU HAVE DIFFICULTY SLEEPING: YES
AVERAGE NUMBER OF SLEEP HOURS: 8
SLEEP PATTERN: DISTURBED/INTERRUPTED SLEEP
RESTFUL SLEEP: YES
DIFFICULTY FALLING ASLEEP: NO
DO YOU USE A SLEEP AID: YES

## 2021-12-16 ASSESSMENT — PATIENT HEALTH QUESTIONNAIRE - PHQ9: SUM OF ALL RESPONSES TO PHQ QUESTIONS 1-9: 14

## 2021-12-16 ASSESSMENT — LIFESTYLE VARIABLES: HISTORY_ALCOHOL_USE: NO

## 2021-12-16 NOTE — ED PROVIDER NOTES
I independently performed a history and physical on Jesusita. All diagnostic, treatment, and disposition decisions were made by myself in conjunction with the advanced practice provider. I have participated in the medical decision making and directed the treatment plan and disposition of the patient. For further details of [facility] emergency department encounter, please see the advanced practice provider's documentation. CHIEF COMPLAINT  Chief Complaint   Patient presents with    Anxiety     per squad report patient has been non compliant with her anxiety medications for the past two weeks. patient states it's only one that she's not taking. patient has an appt with a new doctor to discuss her medications this afternoon around 1400       Briefly, Jesusita is a 67 y.o. female  who presents to the ED complaining of severe anxiety    FOCUSED PHYSICAL EXAMINATION  BP (!) 150/65   Pulse 66   Temp 99.4 °F (37.4 °C) (Oral)   Resp 16   Ht 5' (1.524 m)   Wt 191 lb (86.6 kg)   LMP  (LMP Unknown)   SpO2 97%   BMI 37.30 kg/m²      Focused physical examination:  General appearance:  Cooperative. Skin:  Warm. Dry. Eye:  Extraocular movements intact. Ears, nose, mouth and throat:  Oral mucosa moist,  Neck:  Trachea midline. Heart:  Regular rate and rhythm  Perfusion:  intact  Respiratory:  Lungs clear to auscultation bilaterally. Respirations nonlabored. Abdominal:   Non distended. Nontender  Neurological:  Alert and oriented x 3. Moves all extremities spontaneously  Musculoskeletal:   Normal ROM, no deformities          Psychiatric: Markedly anxious. Stuttering speech. Denies suicidal or homicidal ideations    MDM: Patient presents today for worsening nearly crippling anxiety at home to the point she cannot perform her ADLs. Has not been taking her medications. Markedly anxious here. Denies suicidal or homicidal ideations.   Patient found to have electrolyte abnormalities was transfused potassium here. Spoke with [facility] psychiatry who have accepted the patient in transfer    During the patient's ED course, the patient was given:  Medications   LORazepam (ATIVAN) tablet 1 mg (1 mg Oral Given 12/16/21 1125)   potassium chloride 10 mEq/100 mL IVPB (Peripheral Line) (10 mEq IntraVENous New Bag 12/16/21 1419)   potassium chloride (KLOR-CON M) extended release tablet 40 mEq (40 mEq Oral Given 12/16/21 1417)        CLINICAL IMPRESSION  1. Anxiety state    2. Hypokalemia        DISPOSITION  Transfer      This chart was created using Dragon dictation software. Efforts were made by me to ensure accuracy, however some errors may be present due to limitations of this technology.            Yonatan Peterson MD  12/16/21 2468

## 2021-12-16 NOTE — ED NOTES
Lab called critical value of K:2.8 VIOLA Zamora notified     [place] [facility]  12/16/21 2351

## 2021-12-16 NOTE — ED NOTES
Bed: 17  Expected date:   Expected time:   Means of arrival: [facility]  Comments:  UT (67yo female anxiety)      Hood Julien RN  12/16/21 1904

## 2021-12-16 NOTE — TELEPHONE ENCOUNTER
----- Message from [email or web address] sent at 12/16/2021  3:01 PM EST -----  Subject: Message to Provider    QUESTIONS  Information for Provider? Katiuska Allen called regarding Chanel Stevenson is in the ED   [facility] and will be transported later today to [facility]. Also Katiuska Allen is requesting a potassium supplement be   called in for her due to her potassium level has dropped once again.  ---------------------------------------------------------------------------  --------------  CALL BACK INFO  What is the best way for the office to contact you? OK to leave message on   voicemail  Preferred Call Back Phone Number? [phone]  ---------------------------------------------------------------------------  --------------  SCRIPT ANSWERS  Relationship to Patient? Other  Representative Name? Katiuska Allen   Is the Representative on the appropriate HIPAA document in Epic?  Yes

## 2021-12-16 NOTE — ED PROVIDER NOTES
[facility]  ED  EMERGENCY DEPARTMENT ENCOUNTER      This patient was seen and evaluated by the attending physician. Pt Name: Melanie Wagner  MRN: [id number]  [place] 1945  Date of evaluation: 12/16/2021  Provider: SHEY Tellez  PCP: SHEY Zimmerman CNP      History provided by the patient. CHIEFCOMPLAINT:     Chief Complaint   Patient presents with    Anxiety     per squad report patient has been non compliant with her anxiety medications for the past two weeks. patient states it's only one that she's not taking. patient has an appt with a new doctor to discuss her medications this afternoon around 1400       HISTORY OF PRESENT ILLNESS:      Melanie Wagner is a 67 y.o. female who presents to [address] [place] [address]  ED with complaints of anxiety. Patient states that she has been having issues with her anxiety.  does assist with history patient has been essentially incapacitated from her anxiety, not taking care of her self or eating. Patient has not been compliant with some of her medications. She is a poor historian but does appear anxious. Denies adamantly any suicidal or homicidal ideations. Here for further evaluation. LOCATION:-  QUALITY:-  SEVERITY:-  DURATION:-  MODIFYING FACTORS:-    Nursing Notes were reviewed     REVIEW OF SYSTEMS:     Review of Systems  All systems, a total of 10, are reviewed and negative except for those that were just noted in history present illness.         PAST MEDICAL HISTORY:     Past Medical History:   Diagnosis Date    Anxiety     Arthritis     Cancer [facility]     endometria    Depression     Diabetes mellitus ([facility]     pre diabetic but take medications    Diverticulitis of colon 12/2012    possible    Fibromyalgia     Glaucoma     Heart murmur     Hyperlipidemia     not currently taking meds    Hypertension     Occult blood in stools     Osteopenia     Prediabetes 2/20/2013    Urinary incontinence          SURGICAL HISTORY:      Past Surgical History:   Procedure Laterality Date    CATARACT REMOVAL WITH IMPLANT Left t    COLONOSCOPY  12/03/02 01/18/13    hemorrhoids, diverticulosis    COLONOSCOPY  2/26/16    normal    CYST REMOVAL Right 08/24/2018    removal rifh chest wall sebacious cyst    CYSTOSCOPY N/A 02/15/2019    RIGID CYSTOSCOPY, MID-URETHRAL SLING PLACEMENT     DILATION AND CURETTAGE OF UTERUS      EYE SURGERY Right 8/16/13    phaco with IOL    EYE SURGERY Left 2013    cataract removal with implant    HEMORRHOID SURGERY      HYSTERECTOMY      JOINT REPLACEMENT Left 2010    knee    JOINT REPLACEMENT Right 09/14/2016    KNEE ARTHROPLASTY      LT    KNEE SURGERY      MS EXC SKIN BENIG <5MM REMAINDR BODY N/A 8/24/2018    REMOVAL OF SEBACEOUS CYST - CHEST WALL performed by Davide Smallwood MD at [facility] N/A 2/15/2019    RIGID CYSTOSCOPY, MID-URETHRAL SLING PLACEMENT performed by Annita Howell MD at [place] [address]  2/26/16    WISDOM TOOTH EXTRACTION           CURRENT MEDICATIONS:       Previous Medications    AMLODIPINE (NORVASC) 5 MG TABLET    TAKE ONE TABLET BY MOUTH DAILY    ASPIRIN 81 MG TABLET    Take 1 tablet by mouth daily    ATORVASTATIN (LIPITOR) 20 MG TABLET    TAKE ONE TABLET BY MOUTH DAILY    DORZOLAMIDE-TIMOLOL 22.3-6.8 MG/ML SOLN    Apply 1 drop to eye 2 times daily USE DROPS ON RIGHT EYE ONLY BID.     LATANOPROST (XALATAN) 0.005 % OPHTHALMIC SOLUTION    Place 1 drop into both eyes nightly    LOSARTAN-HYDROCHLOROTHIAZIDE (HYZAAR) 100-25 MG PER TABLET    TAKE ONE TABLET BY MOUTH DAILY    METFORMIN (GLUCOPHAGE) 500 MG TABLET    TAKE ONE TABLET BY MOUTH TWICE A DAY WITH MEALS    METOPROLOL SUCCINATE (TOPROL XL) 100 MG EXTENDED RELEASE TABLET    TAKE ONE TABLET BY MOUTH DAILY, TAKE WITH 25MG DOSE FOR A TOTAL OF 125MG DAILY    METOPROLOL SUCCINATE (TOPROL XL) 25 MG EXTENDED RELEASE TABLET    TAKE ONE TABLET BY MOUTH DAILY    MULTIPLE VITAMINS-MINERALS (MULTIVITAL PO)    Take  by mouth. QUETIAPINE (SEROQUEL) 25 MG TABLET    Take 1 tablet by mouth daily    QUETIAPINE (SEROQUEL) 50 MG TABLET    Take 1 tablet by mouth nightly    SERTRALINE (ZOLOFT) 100 MG TABLET    Take 1.5 tablets by mouth nightly         ALLERGIES:    Norco [hydrocodone-acetaminophen], Ace inhibitors, and Tape [adhesive tape]    FAMILY HISTORY:       Family History   Problem Relation Age of Onset    Cancer Mother         Uterine    High Blood Pressure Mother     Cancer Father         Lung    High Blood Pressure Father     High Blood Pressure Sister     Diabetes Brother     Heart Disease Brother     High Blood Pressure Brother     High Blood Pressure Sister     No Known Problems Sister     Heart Disease Brother           SOCIAL HISTORY:     Social History     Socioeconomic History    Marital status:      Spouse name: None    Number of children: 3    Years of education: None    Highest education level: None   Occupational History    None   Tobacco Use    Smoking status: Former Smoker     Packs/day: 0.25     Years: 1.00     Pack years: 0.25     Types: Cigarettes     Quit date: 1968     Years since quittin.9    Smokeless tobacco: Never Used   Vaping Use    Vaping Use: Never used   Substance and Sexual Activity    Alcohol use: No     Alcohol/week: 0.0 standard drinks    Drug use: No    Sexual activity: None   Other Topics Concern    None   Social History Narrative    None     Social Determinants of Health     Financial Resource Strain:     Difficulty of Paying Living Expenses: Not on file   Food Insecurity:     Worried About Running Out of Food in the Last Year: Not on file    Alvaro of Food in the Last Year: Not on file   Transportation Needs:     Lack of Transportation (Medical): Not on file    Lack of Transportation (Non-Medical):  Not on file   Physical Activity:     Days of Exercise per Week: Not on file    Minutes of Exercise per Session: Not on file   Stress:     Feeling of Stress : Not on file   Social Connections:     Frequency of Communication with Friends and Family: Not on file    Frequency of Social Gatherings with Friends and Family: Not on file    Attends Orthodox Services: Not on file    Active Member of [employer] or Organizations: Not on file    Attends Club or Organization Meetings: Not on file    Marital Status: Not on file   Intimate Partner Violence:     Fear of Current or Ex-Partner: Not on file    Emotionally Abused: Not on file    Physically Abused: Not on file    Sexually Abused: Not on file   Housing Stability:     Unable to Pay for Housing in the Last Year: Not on file    Number of [place] in the Last Year: Not on file    Unstable Housing in the Last Year: Not on file       SCREENINGS:             PHYSICAL EXAM:       ED Triage Vitals [12/16/21 1026]   BP Temp Temp Source Pulse Resp SpO2 Height Weight   (!) 148/78 99.4 °F (37.4 °C) Oral 77 18 98 % 5' (1.524 m) 191 lb (86.6 kg)       Physical Exam    CONSTITUTIONAL: Awake and alert. Cooperative. Well-developed. Well-nourished. Vitals:    12/16/21 1026 12/16/21 1124   BP: (!) 148/78 (!) 150/65   Pulse: 77 66   Resp: 18 16   Temp: 99.4 °F (37.4 °C)    TempSrc: Oral    SpO2: 98% 97%   Weight: 191 lb (86.6 kg)    Height: 5' (1.524 m)      HENT: Normocephalic. Atraumatic. External ears normal, without discharge. TMs clear bilaterally. Nonasal discharge. Oropharynx clear, no erythema. Mucous membranes moist.  EYES: Conjunctiva non-injected, nolid abnormalities noted. No scleral icterus. PERRL. EOM's grossly intact. Anterior chambers clear. NECK: Supple. Normal ROM. No meningismus. No thyroid tenderness or swelling noted. CARDIOVASCULAR: RRR. No Murmer. No carotid bruits. PULMONARY/CHEST WALL: Effort normal. No tachypnea. Lungs clear to ausculation. ABDOMEN: Normal BS. Soft. Nondistended. No tenderness to palpation.  No Ratio 1.2 1.1 - 2.2    Total Bilirubin 1.0 0.0 - 1.0 mg/dL    Alkaline Phosphatase 63 40 - 129 U/L    ALT 15 10 - 40 U/L    AST 22 15 - 37 U/L   Acetaminophen level   Result Value Ref Range    Acetaminophen Level <5 (L) 10 - 30 ug/mL   Salicylate   Result Value Ref Range    Salicylate, Serum <1.2 (L) 15.0 - 30.0 mg/dL   Urinalysis   Result Value Ref Range    Color, UA Yellow Straw/Yellow    Clarity, UA Clear Clear    Glucose, Ur Negative Negative mg/dL    Bilirubin Urine SMALL (A) Negative    Ketones, Urine TRACE (A) Negative mg/dL    Specific Gravity, UA 1.020 1.005 - 1.030    Blood, Urine Negative Negative    pH, UA 6.0 5.0 - 8.0    Protein, UA Negative Negative mg/dL    Urobilinogen, Urine 0.2 <2.0 E.U./dL    Nitrite, Urine Negative Negative    Leukocyte Esterase, Urine SMALL (A) Negative    Microscopic Examination YES     Urine Type NotGiven    Microscopic Urinalysis   Result Value Ref Range    Hyaline Casts, UA 3-5 (A) 0 - 2 /LPF    WBC, UA 6-9 (A) 0 - 5 /HPF    RBC, UA None seen 0 - 4 /HPF    Epithelial Cells, UA 2-5 0 - 5 /HPF    Renal Epithelial, UA 2-5 (A) 0 - 1 /HPF    Bacteria, UA 1+ (A) None Seen /HPF   Magnesium   Result Value Ref Range    Magnesium 1.60 (L) 1.80 - 2.40 mg/dL   Potassium   Result Value Ref Range    Potassium 3.4 (L) 3.5 - 5.1 mmol/L   EKG 12 Lead   Result Value Ref Range    Ventricular Rate 66 BPM    Atrial Rate 66 BPM    P-R Interval 150 ms    QRS Duration 78 ms    Q-T Interval 422 ms    QTc Calculation (Bazett) 442 ms    P Axis 57 degrees    R Axis 9 degrees    T Axis -55 degrees    Diagnosis       Normal sinus rhythmST & T wave abnormality, consider inferior ischemiaST & T wave abnormality, consider anterior ischemiaAbnormal ECGWhen compared with ECG of 04-NOV-2021 15:36,No significant change was foundConfirmed by Sajan Langford ([id number]) on 12/16/2021 5:35:48 PM         RADIOLOGY:  All x-ray studies are viewed/reviewed by me.   Formal interpretations per the radiologist are as and repeat potassium was 3.4. Patient will be transferred to [facility] for psychiatric admission. Patient laboratory studies, radiographic imaging, and assessment were all discussed with the patient and/orpatient family. There was shared decision-making between myself as well as the patient and/or their surrogate and we are all in agreement with ---. There was an opportunity for questions and all questions were answered tothe best of my ability and to the satisfaction of the patient and/or patient family. FINAL IMPRESSION:      1. Anxiety state    2. Hypokalemia          DISPOSITION/PLAN:   DISPOSITION       PATIENT REFERRED TO:  No follow-up provider specified.     DISCHARGE MEDICATIONS:  New Prescriptions    No medications on file                  (Please note thatportions of this note were completed with a voice recognition program.  Efforts were made to edit the dictations, but occasionally words are mis-transcribed.)    SHEY Flores - CNP-C (electronicallysigned)        SHEY Flores CNP  12/16/21 6350 [address] APRN - CNP  12/16/21 Praveen , APRN - CNP  12/16/21 1867

## 2021-12-16 NOTE — CARE COORDINATION
Patient has been accepted at [facility] inpatient psych. By Dr. Calvin Goss. Patient and  notified and agreeable. Once bed ready, they will arrange transport. RN and MD updated. 72 hour hold in place and faxed to [facility].   Electronically signed by AURY Noel, MORALES on 12/16/2021 at 2:51 PM

## 2021-12-16 NOTE — ED NOTES
Pt ambulated to restroom and back to bed tolerated well, labs and urine sent to lab at this time     Mario Cabrera RN  12/16/21 2536

## 2021-12-16 NOTE — ED NOTES
Keegan received from [place] at this time, pt resting in bed with  at bedside     [facility]  12/16/21 [address]

## 2021-12-17 PROBLEM — F41.1 GENERALIZED ANXIETY DISORDER: Status: ACTIVE | Noted: 2021-12-17

## 2021-12-17 LAB
GLUCOSE BLD-MCNC: 141 MG/DL (ref 70–99)
GLUCOSE BLD-MCNC: 168 MG/DL (ref 70–99)
PERFORMED ON: ABNORMAL
PERFORMED ON: ABNORMAL

## 2021-12-17 PROCEDURE — 97530 THERAPEUTIC ACTIVITIES: CPT

## 2021-12-17 PROCEDURE — 97535 SELF CARE MNGMENT TRAINING: CPT

## 2021-12-17 PROCEDURE — 99223 1ST HOSP IP/OBS HIGH 75: CPT

## 2021-12-17 PROCEDURE — 6370000000 HC RX 637 (ALT 250 FOR IP): Performed by: PSYCHIATRY & NEUROLOGY

## 2021-12-17 PROCEDURE — 6370000000 HC RX 637 (ALT 250 FOR IP)

## 2021-12-17 PROCEDURE — 99221 1ST HOSP IP/OBS SF/LOW 40: CPT

## 2021-12-17 PROCEDURE — 1240000000 HC EMOTIONAL WELLNESS R&B

## 2021-12-17 PROCEDURE — 97165 OT EVAL LOW COMPLEX 30 MIN: CPT

## 2021-12-17 RX ORDER — POTASSIUM CHLORIDE 750 MG/1
10 TABLET, EXTENDED RELEASE ORAL ONCE
Status: COMPLETED | OUTPATIENT
Start: 2021-12-17 | End: 2021-12-17

## 2021-12-17 RX ORDER — LORAZEPAM 0.5 MG/1
0.5 TABLET ORAL 2 TIMES DAILY PRN
Status: DISCONTINUED | OUTPATIENT
Start: 2021-12-17 | End: 2021-12-21

## 2021-12-17 RX ADMIN — QUETIAPINE FUMARATE 25 MG: 25 TABLET ORAL at 08:59

## 2021-12-17 RX ADMIN — METOPROLOL SUCCINATE 100 MG: 50 TABLET, EXTENDED RELEASE ORAL at 08:58

## 2021-12-17 RX ADMIN — DORZOLAMIDE HYDROCHLORIDE AND TIMOLOL MALEATE 1 DROP: 20; 5 SOLUTION/ DROPS OPHTHALMIC at 20:27

## 2021-12-17 RX ADMIN — ASPIRIN 81 MG: 81 TABLET, CHEWABLE ORAL at 09:00

## 2021-12-17 RX ADMIN — MAGNESIUM GLUCONATE 500 MG ORAL TABLET 400 MG: 500 TABLET ORAL at 08:56

## 2021-12-17 RX ADMIN — HYDROCHLOROTHIAZIDE 25 MG: 25 TABLET ORAL at 08:57

## 2021-12-17 RX ADMIN — LOSARTAN POTASSIUM 100 MG: 100 TABLET, FILM COATED ORAL at 08:57

## 2021-12-17 RX ADMIN — MAGNESIUM GLUCONATE 500 MG ORAL TABLET 400 MG: 500 TABLET ORAL at 20:26

## 2021-12-17 RX ADMIN — SERTRALINE 150 MG: 100 TABLET, FILM COATED ORAL at 20:26

## 2021-12-17 RX ADMIN — LORAZEPAM 0.5 MG: 0.5 TABLET ORAL at 16:08

## 2021-12-17 RX ADMIN — ATORVASTATIN CALCIUM 20 MG: 10 TABLET, FILM COATED ORAL at 20:26

## 2021-12-17 RX ADMIN — METOPROLOL SUCCINATE 25 MG: 25 TABLET, EXTENDED RELEASE ORAL at 09:04

## 2021-12-17 RX ADMIN — METFORMIN HYDROCHLORIDE 500 MG: 500 TABLET ORAL at 09:00

## 2021-12-17 RX ADMIN — DORZOLAMIDE HYDROCHLORIDE AND TIMOLOL MALEATE 1 DROP: 20; 5 SOLUTION/ DROPS OPHTHALMIC at 09:08

## 2021-12-17 RX ADMIN — LATANOPROST 1 DROP: 50 SOLUTION OPHTHALMIC at 20:27

## 2021-12-17 RX ADMIN — AMLODIPINE BESYLATE 5 MG: 5 TABLET ORAL at 09:00

## 2021-12-17 RX ADMIN — POTASSIUM CHLORIDE 10 MEQ: 750 TABLET, EXTENDED RELEASE ORAL at 08:56

## 2021-12-17 RX ADMIN — HYDROXYZINE PAMOATE 50 MG: 50 CAPSULE ORAL at 09:02

## 2021-12-17 RX ADMIN — QUETIAPINE FUMARATE 50 MG: 25 TABLET ORAL at 20:26

## 2021-12-17 RX ADMIN — METFORMIN HYDROCHLORIDE 500 MG: 500 TABLET ORAL at 17:04

## 2021-12-17 ASSESSMENT — SLEEP AND FATIGUE QUESTIONNAIRES
DIFFICULTY STAYING ASLEEP: YES
DO YOU USE A SLEEP AID: YES
AVERAGE NUMBER OF SLEEP HOURS: 3
DIFFICULTY FALLING ASLEEP: YES
DO YOU HAVE DIFFICULTY SLEEPING: YES
RESTFUL SLEEP: NO
DIFFICULTY ARISING: YES

## 2021-12-17 ASSESSMENT — LIFESTYLE VARIABLES: HISTORY_ALCOHOL_USE: NO

## 2021-12-17 NOTE — PROGRESS NOTES
4 Eyes Skin Assessment     The patient is being assessed for  Admission    I agree that 2 RN's have performed a thorough Head to Toe Skin Assessment on the patient. ALL assessment sites listed below have been assessed. Areas assessed for pressure by both nurses:  yes  [x]   Head, Face, and Ears   [x]   Shoulders, Back, and Chest  [x]   Arms, Elbows, and Hands   [x]   Coccyx, Sacrum, and Ischum  [x]   Legs, Feet, and Heels                                Skin Assessed Under all Medical Devices by both nurses: n/a         All Mepilex Borders were peeled back and area peeked at by both nurses:  No:  n/a  Please list where Mepilex Borders are located:  n/a                 Does the Patient have Skin Breakdown related to pressure?   No     (Insert Photo here n/a)         Philippe Prevention initiated:  No   Wound Care Orders initiated:  No      [facility] nurse consulted for Pressure Injury (Stage 3,4, Unstageable, DTI, NWPT, Complex wounds)and New or Established Ostomies:  No        Nurse 1 eSignature: Electronically signed by Margi Vick RN on 12/17/21 at 12:37 AM EST    **SHARE this note so that the co-signing nurse is able to place an eSignature**    Nurse 2 eSignature: Electronically signed by Alexa Mills RN on 12/17/21 at 11:51 AM EST

## 2021-12-17 NOTE — TELEPHONE ENCOUNTER
Was transferred to [facility] for behavior health and will be there for few days and will call to schedule when she is released.

## 2021-12-17 NOTE — ED NOTES
Pt resting in bed at this time awaiting transport, no s/s of distress noted, will continue to monitor.      [facility]  12/16/21 2033

## 2021-12-17 NOTE — PROGRESS NOTES
Inpatient Occupational Therapy  Evaluation and Treatment    Unit: [facility]  Date:  12/17/2021  Patient Name:    Cristy Hollis  Admitting diagnosis:  Major depression, single episode [F32.9]  Admit Date:  12/16/2021  Precautions/Restrictions/WB Status/ Lines/ Wounds/ Oxygen: fall risk, general BHI precautions     Treatment Time:  9926-0133  Treatment Number: 1     Billable Treatment Time: 25 minutes   Total Treatment Time:   35   minutes    Patient Goals for Therapy:  \" to go home \"      Discharge Recommendations: Home with PRN assistance  and [address]  DME needs for discharge: needs met       Therapy recommendations for staff:  Assist of 1    History of Present Illness: Nico Stephens, 12/17/2021:   \" Patient seen in room on Adult Behavioral Unit. Patient is a 67 y.o. female who presents to the [facility] unit from [facility] ED. Pt came to ED by squad following increasing anxiety that has caused her to be unable to care for herself safely at home and affected sleep and eating as well. Pt has also not been taking her medications as they were prescribed. Pt is a poor historian, becoming increasingly anxious while trying to have a conversation. Pt begins to stutter and become upset when trying to answer questions, stating \"I'm just so confused\". Pt's  states that he does help with her medications, but did not know how often she should have been taking each medication. He describes having to help her more with ADL's and she grows increasingly anxious, unable to eat or care for herself. They do have home health visit twice a week for PT and bathing. Pts  does not feel that she is safe at home in her current condition and wants to get her medications straight and help her build her strength back up. Pt states that she can walk short distances in the home but does not walk much outside of the home. Pt denies suicidal thoughts.  \"     Home Health S4 Level Recommendation:  Level 2 Social    AM-PAC Score: -[facility] Inpatient Daily Activity Raw Score: 18  Pt scored a 18/24 on the -[facility] ADL Inpatient form. Current research shows that an -PAC score of 18 or greater is associated with a discharge to the patient's home setting. Preadmission Environment:    Pt. Lives with spouse. Pt has access to  24/7 assistance by spouse. Home environment:  two story home  Steps to enter first floor:  2-3 steps to enter    Steps to second floor:  Full flight of 12-13  Bathroom: walk in shower and shower chair (standard)   Equipment owned:  none    Preadmission Status / PLOF:  History of falls   No  Pt. Able to drive   Yes - Pt reports she has not driven in a while but hopes to get back to it soon. Pt Fully independent with ADL's  No - staff member comes 1x/wk to assist with needs  Pt. Required assistance from family for:  Cleaning, Cooking and Laundry     Pt sleeps in flat bed. Pt. Fully independent for transfers and gait and walked with: No Device    Pain:  No  Rating:  NA  Location:   None reported throughout   Pain Medicine Status:  No request made      Cognition:    A&O Person and Time   Able to follow 1 step commands, consistently. Subjective:  Pt supine in bed upon therapist arrival. Pt agreeable to work with therapy this date. Upper Extremity ROM:   WFL    Upper Extremity Strength:    BUE strength WFL, but not formally assessed w/ MMT    Upper Extremity Sensation:    WNL    Upper Extremity Proprioception:  WNL    Coordination and Tone:  WNL    Balance:  Functional Sitting Balance: WNL   Comments: Good  Functional Standing Balance:Diminished   Comments: Completed standing balance ax with cones. Pt would  cones on table and reach across body to place on chair on B sides. No LOB. Pt reports fearful and feels her balance has diminished. Pt was hesitant with the ax. Completed 15 reps. Bed mobility:    Supine to sit:    Independent  Sit to supine:   Not Tested  Rolling:    Independent  Scooting in sitting:  Independent  Scooting to head of bed:   Not Tested   Bridging:   Not Tested    Transfers:    Sit to stand:  supervision  Stand to sit:  supervision  Bed to chair:   supervision  Standard toilet: Not Tested  Bed to [facility]:  Not Tested    Activities of Daily Living:   UB Dressing:   Not Tested  LB Dressing:    supervision (shoes)   UB Bathing:  Not Tested  LB Bathing:  Not Tested  Feeding:  Independent  Grooming:   supervision (wash hands at sink)   Toileting:  Not Tested    Activity Tolerance:   Pt completed therapy session with No adverse symptoms noted w/activity    Positioning Needs:   Seated in community room with dinner. Exercise / Activities Initiated:   NT    Patient/Family Education:   Role of OT  Recommendations for DC    Assessment of Deficits: Pt seen for Occupational therapy evaluation in acute care setting. Pt demonstrated decreased Activity tolerance, IADLs, Transfers and Coping Skills. Pt functioning below baseline and will likely benefit from skilled occupational therapy services to maximize safety and independence. Goal(s): To be met in 3 Visits:  1. Pt. To verbalize 3 coping skills. 2. Pt to complete MOCA. To be met in 5 Visits:  1. Pt. To complete interest checklist.    2. Pt. To verbalize understanding of sleep hygiene education. 3. Pt. To verbalize understanding of 3 communication skills. 4. Pt. To complete daily schedule of healthy activities/routines with minimal assist.  5. Full Body bathing: Independent  6. Full Body dressing: Independent  7. HEP: To complete UE exercises independently, per handout. 8. Pt to actively participate in OT groups at least 1x/week. Rehabilitation Potential:  Good for goals listed above. Strengths for achieving goals include: PLOF, Family Support and Pt cooperative  Barriers to achieving goals include:  Complexity of condition     Plan:   To be seen 2-5 x/wk while in acute care setting for strengthening, family/patient education, ADL/IADL retraining, coping skills, leisure exploration, sleep hygiene and communication strategies.       CORA Castellanos, OTR/L   [id number]           If patient discharges from this facility prior to next visit, this note will serve as the Discharge Summary

## 2021-12-17 NOTE — PROGRESS NOTES
admission, federico X, if applicable and completed (first 3 are required if patient doesn't refuse):            ( )  Recognizing danger situations (included triggers and roadblocks)                    ( )  Coping skills (new ways to manage stress, exercise, relaxation techniques, changing routine, distraction)                                                           ( )  Basic information about quitting (benefits of quitting, techniques in how to quit, available resources  ( ) Referral for counseling faxed to [facility]                                           ( ) Patient refused counseling  ( ) Patient has not smoked in the last 30 days    Metabolic Screening:    Lab Results   Component Value Date    LABA1C 5.7 11/04/2021       No results for input(s): CHOL, TRIG, HDL, LDLCALC, LABVLDL in the last 72 hours. Body mass index is 35.23 kg/m². BP Readings from Last 2 Encounters:   12/16/21 138/72   12/16/21 (!) 150/65           Pt admitted with followings belongings:  Dental Appliances: None  Vision - Corrective Lenses: None  Hearing Aid: None  Jewelry: Ring (x2)  Body Piercings Removed: N/A  Clothing: Footwear, Jacket / coat, Pants, Shirt, Bathrobe, Socks, Undergarments (Comment), Pajamas  Were All Patient Medications Collected?: Not Applicable  Other Valuables: None     Valuables sent home with n/a. Valuables placed in safe in security envelope, number:  n/a. Patient's home medications were ordered. Patient oriented to surroundings and program expectations and copy of patient rights given. Received admission packet:  yes. Consents reviewed, signed yes. Refused n/a. Patient verbalize understanding:  yes.     Patient education on precautions: yes                   Kimmy Pavon RN

## 2021-12-17 NOTE — PROGRESS NOTES
Senior Purposeful Rounding    Position:Repositions Self    Physical Environment:Room free from clutter    Pain Rating/ Nonverbal Pain Behaviors:denies; na    Pain interventions Attempted: na    Patient Toileted:Continent and Independent

## 2021-12-17 NOTE — FLOWSHEET NOTE
21 1439   Psychiatric History   Psychiatric history treatment Non-adherence to treatment; Psychiatric admissions  (hx of admissions, resistance to medication)   Contact information N/A   Are there any medication issues? Yes  (Resisting medication)   Support System   Support system Adequate; Access to others; Primary support persons   Types of Support System Sister; Brother; Spouse; Other (Comment)  (Grandkids, kids)   Problems in support system None   Current Living Situation   Home Living Adequate   Living information Lives with others  (Lives with )   Problems with living situation  No   Lack of basic needs No   SSDI/SSI Receives social security insurance (medicare) and supplements, has a senior living penchant   Other government assistance N/A   Problems with environment None   Current abuse issues None   Supervised setting Family supervision   Relationship problems No   Contact information N/A   Medical and Self-Care Issues   Relevant medical problems N/A   Relevant self-care issues Cannot bathe or clothe herself.  Has healthcare workers from [facility] who come twice a week to help her bathe and change clothes   Barriers to treatment No   Family Constellation   Spouse/partner-name/age Dc/   Children-names/ages Edgard/son, Edmar/son, Navi/son   Parents    Siblings Jerome/brother, Marlene/sister   Contact information N/A   Support services   (N/A)   Comment N/A   Childhood   Raised by Biological mother; Biological father   Biological mother Titus Petty father Abbie Baker   Relevant family history N/A   History of abuse No   Comment N/A   Legal History   Legal history No   Other relevant legal issues N/A   Comment N/A   Juvenile legal history No    Abuse Assessment   Physical Abuse Denies   Verbal Abuse Denies   Emotional abuse Denies   Financial Abuse Denies   Sexual abuse Denies   Elder abuse No   Substance Use   Use of substances  No   Motivation for SA Treatment   Current barriers to treatment   (N/A)   Comment N/A   Education   Education HS graduate -GED  (Completed some college education but dropped out)   Special education Other  (N/A)   Work History   Currently employed No   Recent job loss or change Other (Comment)  (N/A)    service Other  (N/A)   /VA involvement N/A   Leisure/Activity   Past interests Cooking, shopping   Present interests Spending time with grandkids   Current daily activity Watch tv, spend time with her family, read   Social with friends/family Yes   Cultural and Spiritual   Spiritual concerns No   Cultural concerns No   Comment N/A   Collateral Contacts   Contacts Other (Comment)  (N/A)

## 2021-12-17 NOTE — TELEPHONE ENCOUNTER
I think her potassium is likely dropping from her not eating and drinking like she should. Lets see after discharge if she still needs this. She will need a hospital follow up when she is discharged and we can discuss the potassium then.

## 2021-12-17 NOTE — PROGRESS NOTES
Patient is able to demonstrate the ability to move from a reclining position to an upright position within the recliner. Pt given a mask to wear on the unit.  Staff will continue to wear a mask while providing patient care

## 2021-12-17 NOTE — H&P
INITIAL PSYCHIATRIC HISTORY AND PHYSICAL      Patient name: Jayjay Russell date: 12/16/2021  Today's date: 12/17/2021           CC: Severe anxiety causing patient to neglect self-care     HPI:   Patient seen in room on Adult Behavioral Unit. Patient is a 67 y.o. female who presents to the [facility] unit from [facility] ED. Pt came to ED by squad following increasing anxiety that has caused her to be unable to care for herself safely at home and affected sleep and eating as well. Pt has also not been taking her medications as they were prescribed. Pt is a poor historian, becoming increasingly anxious while trying to have a conversation. Pt begins to stutter and become upset when trying to answer questions, stating \"I'm just so confused\". Pt's  states that he does help with her medications, but did not know how often she should have been taking each medication. He describes having to help her more with ADL's and she grows increasingly anxious, unable to eat or care for herself. They do have home health visit twice a week for PT and bathing. Pts  does not feel that she is safe at home in her current condition and wants to get her medications straight and help her build her strength back up. Pt states that she can walk short distances in the home but does not walk much outside of the home. Pt denies suicidal thoughts.           PAST PSYCHIATRIC HISTORY:  Anxiety, depression    FAMILY PSYCHIATRIC HISTORY:     Family History   Problem Relation Age of Onset    Cancer Mother         Uterine    High Blood Pressure Mother     Cancer Father         Lung    High Blood Pressure Father     High Blood Pressure Sister     Diabetes Brother     Heart Disease Brother     High Blood Pressure Brother     High Blood Pressure Sister     No Known Problems Sister     Heart Disease Brother        ALLERGIES:    Allergies   Allergen Reactions    Norco [Hydrocodone-Acetaminophen] Nausea Only    Kyra     Ace Inhibitors Rash     Coughing and itching    Tape [place] Tape] Rash       CURRENT MEDICATIONS:     magnesium oxide  400 mg Oral BID    aspirin  81 mg Oral Daily    sertraline  150 mg Oral Nightly    dorzolamide-timolol  1 drop Ophthalmic BID    atorvastatin  20 mg Oral Nightly    metFORMIN  500 mg Oral BID WC    metoprolol succinate  25 mg Oral Daily    metoprolol succinate  100 mg Oral Daily    QUEtiapine  50 mg Oral Nightly    QUEtiapine  25 mg Oral Daily    amLODIPine  5 mg Oral Daily    latanoprost  1 drop Both Eyes Nightly    losartan  100 mg Oral Daily    And    hydroCHLOROthiazide  25 mg Oral Daily       PAST MEDICAL HISTORY:    Past Medical History:   Diagnosis Date    Anxiety     Arthritis     Cancer ([facility] [address]     endometria    Depression     Diabetes mellitus ([facility] [address]     pre diabetic but take medications    Diverticulitis of colon 12/2012    possible    Fibromyalgia     Glaucoma     Heart murmur     Hyperlipidemia     not currently taking meds    Hypertension     Occult blood in stools     Osteopenia     Prediabetes 2/20/2013    Urinary incontinence         PAST SURGICAL HISTORY:    Past Surgical History:   Procedure Laterality Date    CATARACT REMOVAL WITH IMPLANT Left t    COLONOSCOPY  12/03/02 01/18/13    hemorrhoids, diverticulosis    COLONOSCOPY  2/26/16    normal    CYST REMOVAL Right 08/24/2018    removal rifh chest wall sebacious cyst    CYSTOSCOPY N/A 02/15/2019    RIGID CYSTOSCOPY, MID-URETHRAL SLING PLACEMENT     DILATION AND CURETTAGE OF UTERUS      EYE SURGERY Right 8/16/13    phaco with IOL    EYE SURGERY Left 2013    cataract removal with implant    HEMORRHOID SURGERY      HYSTERECTOMY      JOINT REPLACEMENT Left 2010    knee    JOINT REPLACEMENT Right 09/14/2016    KNEE ARTHROPLASTY      LT    KNEE SURGERY      MD EXC SKIN BENIG <5MM REMAINDR BODY N/A 8/24/2018    REMOVAL OF SEBACEOUS CYST - CHEST WALL performed by Leni Villegas, MD at [facility] N/A 2/15/2019    RIGID CYSTOSCOPY, MID-URETHRAL SLING PLACEMENT performed by Josi Bryson MD at [address]  16    WISDOM TOOTH EXTRACTION         PROBLEM LIST:  Principal Problem:    Generalized anxiety disorder  Active Problems:    Type 2 diabetes mellitus without complication, without long-term current use of insulin (HCC)    Major depression, single episode    Hypokalemia    Hypomagnesemia    Asymptomatic bacteriuria  Resolved Problems:    * No resolved hospital problems. *       SOCIAL HISTORY:  Social History     Socioeconomic History    Marital status:      Spouse name: Not on file    Number of children: 3    Years of education: Not on file    Highest education level: Not on file   Occupational History    Not on file   Tobacco Use    Smoking status: Former Smoker     Packs/day: 0.25     Years: 2.00     Pack years: 0.50     Types: Cigarettes     Quit date: 1968     Years since quittin.9    Smokeless tobacco: Never Used   Vaping Use    Vaping Use: Never used   Substance and Sexual Activity    Alcohol use: No     Alcohol/week: 0.0 standard drinks    Drug use: No    Sexual activity: Not on file   Other Topics Concern    Not on file   Social History Narrative    Not on file     Social Determinants of Health     Financial Resource Strain:     Difficulty of Paying Living Expenses: Not on file   Food Insecurity:     Worried About Running Out of Food in the Last Year: Not on file    Alvaro of Food in the Last Year: Not on file   Transportation Needs:     Lack of Transportation (Medical): Not on file    Lack of Transportation (Non-Medical):  Not on file   Physical Activity:     Days of Exercise per Week: Not on file    Minutes of Exercise per Session: Not on file   Stress:     Feeling of Stress : Not on file   Social Connections:     Frequency of Communication with Friends and Family: Not on file  Frequency of Social Gatherings with Friends and Family: Not on file    Attends Worship Services: Not on file    Active Member of Clubs or Organizations: Not on file    Attends Club or Organization Meetings: Not on file    Marital Status: Not on file   Intimate Partner Violence:     Fear of Current or Ex-Partner: Not on file    Emotionally Abused: Not on file    Physically Abused: Not on file    Sexually Abused: Not on file   Housing Stability:     Unable to Pay for Housing in the Last Year: Not on file    Number of [place] in the Last Year: Not on file    Unstable Housing in the Last Year: Not on file       OBJECTIVE:   Vitals:    12/17/21 0959   BP:    Pulse: 82   Resp: 16   Temp: 97.3 °F (36.3 °C)   SpO2: 98%       REVIEW OF SYSTEMS:   Reports no current cardiovascular, respiratory, gastrointestinal, genitourinary,   integumentary, neurological, musculoskeletal, or immunological symptoms today. PSYCHIATRIC:  See HPI above     PSYCHIATRIC EXAMINATION / MENTAL STATUS EXAM:     CONSTITUTIONAL:    Vitals:    Blood pressure (!) 150/71, pulse 82, temperature 97.3 °F (36.3 °C), temperature source Temporal, resp. rate 16, height 5' (1.524 m), weight 180 lb 6.4 oz (81.8 kg), SpO2 98 %, not currently breastfeeding.   General appearance:  [x]  appears age, []  appears older than stated age,     [x]  adequately dressed and groomed, [] disheveled,                [x]  in no acute distress, [] appears mildly distressed,      []  Other:      MUSCULOSKELETAL:   Gait:   [] normal, [] antalgic, [] unsteady, [x] in bed, gait not evaluated   Station:  [] erect, [x] sitting, [] recumbent, [] other        Strength/tone:  [x] muscle strength and tone appear consistent with age and condition     [] atrophy      [] abnormal movements  PSYCHIATRIC:    Relatedness:  [] cooperative, [x] guarded, [] indifferent, [] hostile,      [] sedated  Speech:  [] normal prosody, [] pressured, [] decreased volume,    [] slurred, [] halting, [x] slowed, [] other     [] echolalia, [] incoherent, [x] stuttering   Eye contact:  [] direct, [x] avoidant, [] intense  Kinetics:  [x] normal, [] increased, [] decreased  Mood:   [] stable, [] depressed, [x] anxious, [] irritable,     [] labile  Affect:   [] normal range, [] constricted, [] depressed, [x] anxious,     [] angry, [] blunted  Hallucinations  [x] denies, [] auditory,  [] visual,  [] olfactory, [] tactile  Delusions  [x] none, [] grandiose,  [] jealous,  [] persecutory,  [] somatic,     [] bizarre  Preoccupations  [x] none, [] violence, [] obsessions, [] other     Suicidal ideation  [x] denies, [] endorses  Homicidal ideation [x] denies, [] endorses  Thought process: [] logical, [] circumstantial, [] tangential, [] VIOLETTA,     [x] simplistic, [] disorganized  Associations:  [x] logical and coherent, [] loosening, [] disorganized  Insight:   [] good, [x] fair, [] poor  Judgment:  [] good, [x] fair, [] poor  Attention and concentration:     [] intact, [x] limited, [] able to focus on interview,     [] grossly impaired  Orientation:  [x] person, place, time, situation     [] disoriented to:     Memory:  Remote memory [x] intact, [] impaired     Recent memory  [x] intact, [] impaired          IMPRESSION    Principal Problem:    Generalized anxiety disorder  Active Problems:    Type 2 diabetes mellitus without complication, without long-term current use of insulin (HCC)    Major depression, single episode    Hypokalemia    Hypomagnesemia    Asymptomatic bacteriuria  Resolved Problems:    * No resolved hospital problems. *       ______      Tx plan:  Prevent self injury, stabilize affect, restore sleep, treat depression, treat anxiety, establish/maintain aftercare, increase coping mechanisms, improve medication compliance. All conditions present on admission are being treated while pt is hospitalized. Discussed PHP after discharge as part of transition back to the community.      Medications  Current Facility-Administered Medications   Medication Dose Route Frequency Provider Last Rate Last Admin    magnesium oxide (MAG-OX) tablet 400 mg  400 mg Oral BID ROBERT Stewart   400 mg at 12/17/21 0856    LORazepam (ATIVAN) tablet 0.5 mg  0.5 mg Oral BID PRN Carolann Bulla, APRN - CNP        acetaminophen (TYLENOL) tablet 650 mg  650 mg Oral Q4H PRN Carolann Bulla, APRN - CNP        ibuprofen (ADVIL;MOTRIN) tablet 400 mg  400 mg Oral Q6H PRN Carolann Bulla, APRN - CNP        magnesium hydroxide (MILK OF MAGNESIA) 400 MG/5ML suspension 30 mL  30 mL Oral Daily PRN Carolann Bulla, APRN - CNP        aluminum & magnesium hydroxide-simethicone (MAALOX) 200-200-20 MG/5ML suspension 30 mL  30 mL Oral Q6H PRN Carolann Bulla, APRN - CNP        sterile water injection 2.1 mL  2.1 mL IntraMUSCular Q4H PRN Carolann Bulla, APRN - CNP        benztropine mesylate (COGENTIN) injection 2 mg  2 mg IntraMUSCular BID PRN Carolann Bulla, APRN - CNP        nicotine polacrilex (COMMIT) lozenge 2 mg  2 mg Oral Q1H PRN Carolann Bulla, APRN - CNP        traZODone (DESYREL) tablet 50 mg  50 mg Oral Nightly PRN Carolann Bulla, APRN - CNP        hydrOXYzine (VISTARIL) capsule 50 mg  50 mg Oral Q6H PRN Carolann Bulla, APRN - CNP   50 mg at 12/17/21 0902    OLANZapine (ZYPREXA) tablet 10 mg  10 mg Oral Q8H PRN Scott Rivera MD        Or    OLANZapine THE [facility]) injection 10 mg  10 mg IntraMUSCular Q8H PRN Scott Rivera MD        aspirin chewable tablet 81 mg  81 mg Oral Daily Scott Rivera MD   81 mg at 12/17/21 0900    sertraline (ZOLOFT) tablet 150 mg  150 mg Oral Nightly Scott Rivera MD   150 mg at 12/16/21 2323    dorzolamide-timolol (COSOPT) 22.3-6.8 MG/ML ophthalmic solution 1 drop  1 drop Ophthalmic BID Scott Rivera MD   1 drop at 12/17/21 0908    atorvastatin (LIPITOR) tablet 20 mg  20 mg Oral Nightly Scott Rivera MD   20 mg at 12/16/21 2323    metFORMIN (GLUCOPHAGE) tablet 500 mg  500 mg Oral BID WC Anabelle Flash Dustin La MD   500 mg at 12/17/21 0900    metoprolol succinate (TOPROL XL) extended release tablet 25 mg  25 mg Oral Daily Jovany Balderas MD   25 mg at 12/17/21 3495    metoprolol succinate (TOPROL XL) extended release tablet 100 mg  100 mg Oral Daily Jovany Balderas MD   100 mg at 12/17/21 0858    QUEtiapine (SEROQUEL) tablet 50 mg  50 mg Oral Nightly Jovany Balderas MD   50 mg at 12/16/21 2323    QUEtiapine (SEROQUEL) tablet 25 mg  25 mg Oral Daily Jovany Balderas MD   25 mg at 12/17/21 0859    amLODIPine (NORVASC) tablet 5 mg  5 mg Oral Daily Jovany Balderas MD   5 mg at 12/17/21 0900    latanoprost (XALATAN) 0.005 % ophthalmic solution 1 drop  1 drop Both Eyes Nightly Jovany Balderas MD   1 drop at 12/16/21 2339    losartan (COZAAR) tablet 100 mg  100 mg Oral Daily Jovany Balderas MD   100 mg at 12/17/21 1220    And    hydroCHLOROthiazide (HYDRODIURIL) tablet 25 mg  25 mg Oral Daily Jovany Balderas MD   25 mg at 12/17/21 0857      magnesium oxide  400 mg Oral BID    aspirin  81 mg Oral Daily    sertraline  150 mg Oral Nightly    dorzolamide-timolol  1 drop Ophthalmic BID    atorvastatin  20 mg Oral Nightly    metFORMIN  500 mg Oral BID WC    metoprolol succinate  25 mg Oral Daily    metoprolol succinate  100 mg Oral Daily    QUEtiapine  50 mg Oral Nightly    QUEtiapine  25 mg Oral Daily    amLODIPine  5 mg Oral Daily    latanoprost  1 drop Both Eyes Nightly    losartan  100 mg Oral Daily    And    hydroCHLOROthiazide  25 mg Oral Daily      PRN Meds: LORazepam, acetaminophen, ibuprofen, magnesium hydroxide, aluminum & magnesium hydroxide-simethicone, sterile water, benztropine mesylate, nicotine polacrilex, traZODone, hydrOXYzine, OLANZapine **OR** OLANZapine   Estimated length of stay: 2-3 days  Prognosis:  fair   Criteria for Discharge:  Not suicidal, sleeping well, affect stable, depression improving, eating well, aftercare arranged.      Spent > 70 minutes evaluating and treating patient, more than 50 % of that time was spent counseling the patient on their symptoms, treatment, and expected goals. ______  PLAN   1. Admit to Adult Behavioral Unit / Senior Behavioral Unit  2. Consult Internal Medicine to evaluate and treat medical conditions  3. Adjust psychotropic medications to target symptoms  4. Occupational Therapy, Physical Therapy, Group Psychotherapy as tolerated   5. Reviewed treatment plan with patient including medication risks, benefits, side effects. Obtained informed consent for treatment.      MISSY De La OP

## 2021-12-17 NOTE — H&P
Hospital Medicine History & Physical      PCP: SHEY Caro - CNP    Date of Admission: 12/16/2021    Date of Service: Pt seen/examined on 12/17/2021      Chief Complaint:  No chief complaint on file. History Of Present Illness: The patient is a 67 y.o. female with DMII, HTN, HLD and glaucoma who presented to [address] [place] [address] for major depression. Patient was seen and evaluated in the ED by the ED medical provider, patient was medically cleared for admission [place] [facility] [place] This note serves as an admission medical H&P.     Tobacco use: Denies  ETOH use: Socially  Illicit drug use: Denies    Patient denies any medical complaints     Past Medical History:        Diagnosis Date    Anxiety     Arthritis     Cancer ([facility] [address]     endometria    Depression     Diabetes mellitus ([facility] [address]     pre diabetic but take medications    Diverticulitis of colon 12/2012    possible    Fibromyalgia     Glaucoma     Heart murmur     Hyperlipidemia     not currently taking meds    Hypertension     Occult blood in stools     Osteopenia     Prediabetes 2/20/2013    Urinary incontinence        Past Surgical History:        Procedure Laterality Date    CATARACT REMOVAL WITH IMPLANT Left t    COLONOSCOPY  12/03/02 01/18/13    hemorrhoids, diverticulosis    COLONOSCOPY  2/26/16    normal    CYST REMOVAL Right 08/24/2018    removal rifh chest wall sebacious cyst    CYSTOSCOPY N/A 02/15/2019    RIGID CYSTOSCOPY, MID-URETHRAL SLING PLACEMENT     DILATION AND CURETTAGE OF UTERUS      EYE SURGERY Right 8/16/13    phaco with IOL    EYE SURGERY Left 2013    cataract removal with implant    HEMORRHOID SURGERY      HYSTERECTOMY      JOINT REPLACEMENT Left 2010    knee    JOINT REPLACEMENT Right 09/14/2016    KNEE ARTHROPLASTY      LT    KNEE SURGERY      CT EXC SKIN BENIG <5MM REMAINDR BODY N/A 8/24/2018    REMOVAL OF SEBACEOUS CYST - CHEST WALL performed by Wicho Erazo MD at [facility] N/A 2/15/2019    RIGID CYSTOSCOPY, MID-URETHRAL SLING PLACEMENT performed by Julián Orozco MD [address]  2/26/16    WISDOM TOOTH EXTRACTION         Medications Prior to Admission:    Prior to Admission medications    Medication Sig Start Date End Date Taking? Authorizing Provider   latanoprost (XALATAN) 0.005 % ophthalmic solution Place 1 drop into both eyes nightly   Yes Historical Provider, MD   losartan-hydroCHLOROthiazide (HYZAAR) 100-25 MG per tablet TAKE ONE TABLET BY MOUTH DAILY 11/17/21  Yes Chidi Tan MD   amLODIPine (NORVASC) 5 MG tablet TAKE ONE TABLET BY MOUTH DAILY 11/8/21  Yes SHEY Lopez CNP   QUEtiapine (SEROQUEL) 50 MG tablet Take 1 tablet by mouth nightly 11/5/21  Yes Meredith Gramajo MD   QUEtiapine (SEROQUEL) 25 MG tablet Take 1 tablet by mouth daily 11/5/21  Yes Meredith Gramajo MD   metoprolol succinate (TOPROL XL) 100 MG extended release tablet TAKE ONE TABLET BY MOUTH DAILY, TAKE WITH 25MG DOSE FOR A TOTAL OF 125MG DAILY 10/5/21  Yes SHEY Lopez CNP   metoprolol succinate (TOPROL XL) 25 MG extended release tablet TAKE ONE TABLET BY MOUTH DAILY 9/21/21  Yes SHEY Joyce CNP   metFORMIN (GLUCOPHAGE) 500 MG tablet TAKE ONE TABLET BY MOUTH TWICE A DAY WITH MEALS 7/19/21  Yes SHEY Joyce CNP   atorvastatin (LIPITOR) 20 MG tablet TAKE ONE TABLET BY MOUTH DAILY  Patient taking differently: nightly TAKE ONE TABLET BY MOUTH DAILY 7/1/21  Yes SHEY Joyce CNP   dorzolamide-timolol 22.3-6.8 MG/ML SOLN Apply 1 drop to eye 2 times daily USE DROPS ON RIGHT EYE ONLY BID.    Yes Historical Provider, MD   sertraline (ZOLOFT) 100 MG tablet Take 1.5 tablets by mouth nightly 8/6/20  Yes Ronny Guido MD   aspirin 81 MG tablet Take 1 tablet by mouth daily 5/2/18  Yes Ronny Guido MD       Allergies:  Norco [hydrocodone-acetaminophen], Vanilla, Ace inhibitors, and Tape Genella Bakes tape]    Social History:  The patient currently lives at home with     TOBACCO:   reports that she quit smoking about 53 years ago. Her smoking use included cigarettes. She has a 0.50 pack-year smoking history. She has never used smokeless tobacco.  ETOH:   reports no history of alcohol use. Family History:   Positive as follows:        Problem Relation Age of Onset    Cancer Mother         Uterine    High Blood Pressure Mother     Cancer Father         Lung    High Blood Pressure Father     High Blood Pressure Sister     Diabetes Brother     Heart Disease Brother     High Blood Pressure Brother     High Blood Pressure Sister     No Known Problems Sister     Heart Disease Brother        REVIEW OF SYSTEMS:       Constitutional: Negative for fever   HENT: Negative for sore throat   Eyes: Negative for redness   Respiratory: Negative  for dyspnea, cough   Cardiovascular: Negative for chest pain   Gastrointestinal: Negative for vomiting, diarrhea   Genitourinary: Negative for hematuria   Musculoskeletal: Negative for arthralgias   Skin: Negative for rash   Neurological: Negative for syncope    Hematological: Negative for easy bruising/bleeding   Psychiatric/Behavorial: Per psychiatry team evaluation     PHYSICAL EXAM:    /72   Pulse 81   Temp 97.1 °F (36.2 °C) (Temporal)   Resp 16   Ht 5' (1.524 m)   Wt 180 lb 6.4 oz (81.8 kg)   LMP  (LMP Unknown)   SpO2 94%   BMI 35.23 kg/m²     Gen: No distress. Alert. Eyes: PERRL. No sclera icterus. No conjunctival injection. ENT: No discharge. Pharynx clear. Neck: Trachea midline. Resp: No accessory muscle use. No crackles. No wheezes. No rhonchi. CV: Regular rate. Regular rhythm. Systolic ejection murmur noted, chronic. No rub. No edema. GI: Non-tender. Non-distended. Normal bowel sounds. Skin: Warm and dry. No nodule on exposed extremities. No rash on exposed extremities. M/S: No cyanosis. No joint deformity. No clubbing. Neuro: Awake.  No

## 2021-12-17 NOTE — PROGRESS NOTES
[facility]  Initial Interdisciplinary Treatment Plan NOTE    Review Date & Time: 12/17/2021 0940  Patient was not in treatment team    Admission Type:   Admission Type:  Involuntary    Reason for admission:  Reason for Admission: Increasing anxiety and unable to care for self      Estimated Length of Stay Update:  7 days  Estimated Discharge Date Update: 12/24/2021    EDUCATION:   Learner Progress Toward Treatment Goals: Reviewed goals and plan of care    Method: Group    Outcome: Verbalized understanding    PATIENT GOALS: Relief of symptoms and improved ADLS    PLAN/TREATMENT RECOMMENDATIONS UPDATE: Initial    GOALS UPDATE:   Time frame for Short-Term Goals: 3 days    Ilya Slaughter RN

## 2021-12-17 NOTE — FLOWSHEET NOTE
12/17/21 1449   Activities of Daily Living   Patient Requires assistance with daily self-care activities?  Yes   Patient identified needing assistance with: Bathing; Dressing; Housecleaning   Details Has healthcare workers from [facility] who come out to their house weekly to assist with self-care needs   Person Assisting  or Other professional caregiver   [address]   Leisure Activity 1   3 Favorite Leisure Activities Cooking   Frequency monthly   Last time in the last 3 months   Barriers to participating  motivation   Leisure Activity 2   [address]  Spending time with grandkids   Frequency  monthly   Last time  in the last 3 months   Barriers to participating    (N/A)   Leisure Activity 3   Favorite Leisure Activities  Watching television   Frequency  weekly   Last time  this month   Barriers to participating    (N/A)   Social   Patient reports spending the majority of their free time with one other person   Patient verbalizes a preference for spending free time with one other person   Patients perception of support system healthy/strong   Patients perception of barriers to socializing with others include(s) lack of motivation/interest   Social Details N/A   Beliefs & Coping   Has difficulty dealing with feelings   No   Internalizes feelings/Keeps feelings in No   Externalizes feelings through aggressiveness or poor temper control  No   Feels uncomfortable around others  No   Has difficulty talking to others  No   Depends on others for direction or decisions Yes   Difficulty dealing with anger of others  No   Difficulty dealing with own anger  No   Difficulty managing stress Yes   Frequently has difficulty with relationships  No   Has recently perceived/experienced loss, disappointment, humiliation or failure  NO   General perception about self ambivalent   Attitude about abilities more successful than not   Locus of Control  sometimes   Belief about recovery Recovery is possible   Patient Identified Strengths  Demonstrates basic social skills, positive leisure interests, positive support network   Patient Identified Limitations  Tendency to isolate self, difficulty problem solving, perceives need for assistance with self-care   Perception of most stressful event prior to hospitalization AIDA   Perception of changes needed N/A   Strengths and Limitations   Strengths Demonstrates basic social skills; Positive leisure interests; Positive support network   Limitations Tendency to isolate self; Difficulty problem solving/relies on others to help solve problems;  Perceives need for assistance with self-care

## 2021-12-18 LAB
GLUCOSE BLD-MCNC: 116 MG/DL (ref 70–99)
GLUCOSE BLD-MCNC: 122 MG/DL (ref 70–99)
MAGNESIUM: 1.9 MG/DL (ref 1.8–2.4)
PERFORMED ON: ABNORMAL
PERFORMED ON: ABNORMAL
POTASSIUM SERPL-SCNC: 2.9 MMOL/L (ref 3.5–5.1)
POTASSIUM SERPL-SCNC: 4 MMOL/L (ref 3.5–5.1)

## 2021-12-18 PROCEDURE — 6370000000 HC RX 637 (ALT 250 FOR IP): Performed by: PSYCHIATRY & NEUROLOGY

## 2021-12-18 PROCEDURE — 6370000000 HC RX 637 (ALT 250 FOR IP)

## 2021-12-18 PROCEDURE — 1240000000 HC EMOTIONAL WELLNESS R&B

## 2021-12-18 PROCEDURE — 6370000000 HC RX 637 (ALT 250 FOR IP): Performed by: NURSE PRACTITIONER

## 2021-12-18 PROCEDURE — 36415 COLL VENOUS BLD VENIPUNCTURE: CPT

## 2021-12-18 PROCEDURE — 83735 ASSAY OF MAGNESIUM: CPT

## 2021-12-18 PROCEDURE — 84132 ASSAY OF SERUM POTASSIUM: CPT

## 2021-12-18 RX ORDER — POTASSIUM CHLORIDE 20 MEQ/1
20 TABLET, EXTENDED RELEASE ORAL ONCE
Status: COMPLETED | OUTPATIENT
Start: 2021-12-18 | End: 2021-12-18

## 2021-12-18 RX ORDER — POTASSIUM CHLORIDE 20 MEQ/1
40 TABLET, EXTENDED RELEASE ORAL ONCE
Status: COMPLETED | OUTPATIENT
Start: 2021-12-18 | End: 2021-12-18

## 2021-12-18 RX ADMIN — METFORMIN HYDROCHLORIDE 500 MG: 500 TABLET ORAL at 10:43

## 2021-12-18 RX ADMIN — HYDROCHLOROTHIAZIDE 25 MG: 25 TABLET ORAL at 10:39

## 2021-12-18 RX ADMIN — DORZOLAMIDE HYDROCHLORIDE AND TIMOLOL MALEATE 1 DROP: 20; 5 SOLUTION/ DROPS OPHTHALMIC at 20:32

## 2021-12-18 RX ADMIN — ATORVASTATIN CALCIUM 20 MG: 10 TABLET, FILM COATED ORAL at 20:32

## 2021-12-18 RX ADMIN — LORAZEPAM 0.5 MG: 0.5 TABLET ORAL at 20:32

## 2021-12-18 RX ADMIN — LORAZEPAM 0.5 MG: 0.5 TABLET ORAL at 09:52

## 2021-12-18 RX ADMIN — DORZOLAMIDE HYDROCHLORIDE AND TIMOLOL MALEATE 1 DROP: 20; 5 SOLUTION/ DROPS OPHTHALMIC at 10:41

## 2021-12-18 RX ADMIN — AMLODIPINE BESYLATE 5 MG: 5 TABLET ORAL at 10:37

## 2021-12-18 RX ADMIN — POTASSIUM CHLORIDE 40 MEQ: 1500 TABLET, EXTENDED RELEASE ORAL at 09:26

## 2021-12-18 RX ADMIN — METFORMIN HYDROCHLORIDE 500 MG: 500 TABLET ORAL at 17:06

## 2021-12-18 RX ADMIN — LOSARTAN POTASSIUM 100 MG: 100 TABLET, FILM COATED ORAL at 10:37

## 2021-12-18 RX ADMIN — METOPROLOL SUCCINATE 100 MG: 50 TABLET, EXTENDED RELEASE ORAL at 10:38

## 2021-12-18 RX ADMIN — METOPROLOL SUCCINATE 25 MG: 25 TABLET, EXTENDED RELEASE ORAL at 10:40

## 2021-12-18 RX ADMIN — SERTRALINE 150 MG: 100 TABLET, FILM COATED ORAL at 20:32

## 2021-12-18 RX ADMIN — QUETIAPINE FUMARATE 25 MG: 25 TABLET ORAL at 10:36

## 2021-12-18 RX ADMIN — LATANOPROST 1 DROP: 50 SOLUTION OPHTHALMIC at 20:32

## 2021-12-18 RX ADMIN — QUETIAPINE FUMARATE 50 MG: 25 TABLET ORAL at 20:32

## 2021-12-18 RX ADMIN — TRAZODONE HYDROCHLORIDE 50 MG: 50 TABLET ORAL at 20:32

## 2021-12-18 RX ADMIN — ASPIRIN 81 MG: 81 TABLET, CHEWABLE ORAL at 10:36

## 2021-12-18 RX ADMIN — POTASSIUM CHLORIDE 20 MEQ: 1500 TABLET, EXTENDED RELEASE ORAL at 10:37

## 2021-12-18 ASSESSMENT — PAIN SCALES - GENERAL
PAINLEVEL_OUTOF10: 0

## 2021-12-18 NOTE — PROGRESS NOTES
Pt appeared extremely anxious this morning upon assessment and reported feeling anxious. Pt appeared tremulous and nervous. Pt was having difficulty answering questions giving delayed answers at times, unable to answer questions stating \"I don't know\", or beginning to stutter when trying to answer questions. Writer also noted that the pt had rapid and shallow breaths. Pt given ativan @ 5357 for anxiety. Pt also educated on some coping strategies she can use including deep breathing techniques. Both were effective. Pt currently is resting in bed w/ eyes closed. Will continue to monitor.

## 2021-12-18 NOTE — PLAN OF CARE
Problem: Falls - Risk of:  Goal: Will remain free from falls  Description: Will remain free from falls  Outcome: Ongoing  Goal: Absence of physical injury  Description: Absence of physical injury  Outcome: Ongoing     Problem: Altered Mood, Depressive Behavior:  Goal: Able to verbalize acceptance of life and situations over which he or she has no control  Description: Able to verbalize acceptance of life and situations over which he or she has no control  Outcome: Ongoing  Goal: Able to verbalize and/or display a decrease in depressive symptoms  Description: Able to verbalize and/or display a decrease in depressive symptoms  Outcome: Ongoing    Pt is alert and oriented x4. Pt is forgetful at times. Reports getting confused and overwhelmed often, especially when having to make decisions. Did appear to become more anxious and nervous when pt was asked questions or asked to make even very small decisions. Pt reports feeling scared of other pts, feels like she cannot change clothes or go to the bathroom due to fear of someone coming in her room. Pt reassured by [address] but this was only somewhat effective. Pt had better appetite this shift, ate 75% of lunch and dinner. Pt ambulates w/ SBA due to weakness and nervousness about walking. Denies SI/HI/AVH, no RTIS noted. Pt denies any pain at this time. Will continue to monitor.

## 2021-12-18 NOTE — PROGRESS NOTES
Comprehensive Nutrition Assessment    Type and Reason for Visit:  Initial,Positive Nutrition Screen (wt loss)    Nutrition Recommendations/Plan:   1. Continue Regular diet  2. Added Ensure HP TID    Nutrition Assessment:    Pt. nutritionally compromised AEB she reports weight loss that is unspecified and from review of EMR it appears that she has lost ~ 50-60# in a year some which may be have been planned with implementing exercise but recently her anxiety has caused her to not eat per  . At risk for further nutrition compromise r/t her anxiety cause reduced appetite . Will add supps with meals . Malnutrition Assessment:  Malnutrition Status: At risk for malnutrition (Comment)    Context:  Chronic Illness     Findings of the 6 clinical characteristics of malnutrition:    Estimated Daily Nutrient Needs:  Energy (kcal):  7150-0739 based ~ 15-18 kcal/kg cbw; Weight Used for Energy Requirements:  Current     Protein (g):  64-73 based ~ 1.4-1.6 gr/kg; Weight Used for Protein Requirements:  Ideal        Fluid (ml/day):  5003-8798; Method Used for Fluid Requirements:  1 ml/kcal      Nutrition Related Findings:  debilitating anxiety has cuased pt to reduce intake of food and fluids as sh ewas grooming herself and nottakingher medications for > 4 weeks      Wounds:  None       Current Nutrition Therapies:    ADULT DIET;  Regular    Anthropometric Measures:  · Height: 5' (152.4 cm)  · Current Body Weight: 180 lb 6.4 oz (81.8 kg)   · Admission Body Weight: 191 lb (86.6 kg)    · Usual Body Weight: 250 lb (113.4 kg)     · Ideal Body Weight: 100 lbs; % Ideal Body Weight 180.4 %   · BMI: 35.2  · BMI Categories: Obese Class 2 (BMI 35.0 -39.9)       Nutrition Diagnosis:   · Unintended weight loss related to inadequate protein-energy intake,psychological cause or life stress as evidenced by poor intake prior to admission,weight loss      Nutrition Interventions:   Food and/or Nutrient Delivery:  Continue Current Diet,Start Oral Nutrition Supplement  Nutrition Education/Counseling:  No recommendation at this time   Coordination of Nutrition Care:  Continue to monitor while inpatient    Goals:  pt will continue to accept and consume > 50% of meals and supps       Nutrition Monitoring and Evaluation:   Behavioral-Environmental Outcomes:  None Identified   Food/Nutrient Intake Outcomes:  Food and Nutrient Intake,Supplement Intake  Physical Signs/Symptoms Outcomes:  Weight,Biochemical Data     Discharge Planning:     Too soon to determine     Electronically signed by Lev Castillo RD, LD on 12/18/21 at 2:23 PM EST    Contact: [phone]

## 2021-12-18 NOTE — PLAN OF CARE
Problem: Falls - Risk of:  Goal: Will remain free from falls  Description: Will remain free from falls  12/17/2021 2342 by Sung Blood  Outcome: Ongoing     Problem: Altered Mood, Depressive Behavior:  Goal: Able to verbalize acceptance of life and situations over which he or she has no control  Description: Able to verbalize acceptance of life and situations over which he or she has no control  12/17/2021 2342 by Sung Blood  Outcome: Ongoing     Problem: Altered Mood, Depressive Behavior:  Goal: Able to verbalize and/or display a decrease in depressive symptoms  Description: Able to verbalize and/or display a decrease in depressive symptoms  12/17/2021 2342 by Sung Blood  Outcome: Ongoing     Problem: Altered Mood, Depressive Behavior:  Goal: Ability to disclose and discuss suicidal ideas will improve  Description: Ability to disclose and discuss suicidal ideas will improve  12/17/2021 2342 by Sung Blood  Outcome: Ongoing     Alert and oriented x 4. Anxious and isolative to room this shift. States she feels \"slugglish\" today. Denies SI/HI/hallucinations. Compliant with scheduled medications. Monitoring continued per orders.

## 2021-12-18 NOTE — PLAN OF CARE
Nutrition Problem #1: Unintended weight loss  Intervention: Food and/or Nutrient Delivery: Continue Current Diet,Start Oral Nutrition Supplement  Nutritional Goals: pt will continue to accept and consume > 50% of meals and supps

## 2021-12-18 NOTE — BH NOTE
Lab contacted unit regarding panic potassium level of 2.9. Lab contacted the unit @ 0789 and potassium was drawn @ 0639 this morning. Thuy Dave NP from medical notified via perfect serve @ 1198. Potassium PO x2 ordered for 0815 and 1015, potassium ordered to be redrawn today @ 1400 both per [place]. Will continue to monitor.

## 2021-12-18 NOTE — PROGRESS NOTES
Pt appears less anxious at this time and reports feeling somewhat better. She appears less tremulous. She is also able to answer questions more promptly and engage more in the conversation. Pt does continue to appear nervous, has poor eye contact. She continues to be isolative to her room and bed. She was able to eat 75% of lunch and good fluid intake.

## 2021-12-19 LAB
ANION GAP SERPL CALCULATED.3IONS-SCNC: 11 MMOL/L (ref 3–16)
BUN BLDV-MCNC: 21 MG/DL (ref 7–20)
CALCIUM SERPL-MCNC: 10 MG/DL (ref 8.3–10.6)
CHLORIDE BLD-SCNC: 104 MMOL/L (ref 99–110)
CO2: 26 MMOL/L (ref 21–32)
CREAT SERPL-MCNC: 1 MG/DL (ref 0.6–1.2)
GFR AFRICAN AMERICAN: >60
GFR NON-AFRICAN AMERICAN: 54
GLUCOSE BLD-MCNC: 103 MG/DL (ref 70–99)
GLUCOSE BLD-MCNC: 107 MG/DL (ref 70–99)
GLUCOSE BLD-MCNC: 130 MG/DL (ref 70–99)
MAGNESIUM: 1.9 MG/DL (ref 1.8–2.4)
PERFORMED ON: ABNORMAL
PERFORMED ON: ABNORMAL
POTASSIUM REFLEX MAGNESIUM: 3.4 MMOL/L (ref 3.5–5.1)
SODIUM BLD-SCNC: 141 MMOL/L (ref 136–145)

## 2021-12-19 PROCEDURE — 6370000000 HC RX 637 (ALT 250 FOR IP): Performed by: PSYCHIATRY & NEUROLOGY

## 2021-12-19 PROCEDURE — 1240000000 HC EMOTIONAL WELLNESS R&B

## 2021-12-19 PROCEDURE — 83735 ASSAY OF MAGNESIUM: CPT

## 2021-12-19 PROCEDURE — 36415 COLL VENOUS BLD VENIPUNCTURE: CPT

## 2021-12-19 PROCEDURE — 99233 SBSQ HOSP IP/OBS HIGH 50: CPT | Performed by: PSYCHIATRY & NEUROLOGY

## 2021-12-19 PROCEDURE — 6370000000 HC RX 637 (ALT 250 FOR IP)

## 2021-12-19 PROCEDURE — 80048 BASIC METABOLIC PNL TOTAL CA: CPT

## 2021-12-19 RX ADMIN — METFORMIN HYDROCHLORIDE 500 MG: 500 TABLET ORAL at 08:26

## 2021-12-19 RX ADMIN — LATANOPROST 1 DROP: 50 SOLUTION OPHTHALMIC at 20:16

## 2021-12-19 RX ADMIN — METFORMIN HYDROCHLORIDE 500 MG: 500 TABLET ORAL at 16:59

## 2021-12-19 RX ADMIN — HYDROCHLOROTHIAZIDE 25 MG: 25 TABLET ORAL at 08:26

## 2021-12-19 RX ADMIN — LORAZEPAM 0.5 MG: 0.5 TABLET ORAL at 18:43

## 2021-12-19 RX ADMIN — LOSARTAN POTASSIUM 100 MG: 100 TABLET, FILM COATED ORAL at 08:25

## 2021-12-19 RX ADMIN — AMLODIPINE BESYLATE 5 MG: 5 TABLET ORAL at 08:26

## 2021-12-19 RX ADMIN — QUETIAPINE FUMARATE 50 MG: 25 TABLET ORAL at 20:14

## 2021-12-19 RX ADMIN — DORZOLAMIDE HYDROCHLORIDE AND TIMOLOL MALEATE 1 DROP: 20; 5 SOLUTION/ DROPS OPHTHALMIC at 20:15

## 2021-12-19 RX ADMIN — QUETIAPINE FUMARATE 25 MG: 25 TABLET ORAL at 08:25

## 2021-12-19 RX ADMIN — METOPROLOL SUCCINATE 100 MG: 50 TABLET, EXTENDED RELEASE ORAL at 08:25

## 2021-12-19 RX ADMIN — METOPROLOL SUCCINATE 25 MG: 25 TABLET, EXTENDED RELEASE ORAL at 08:29

## 2021-12-19 RX ADMIN — ASPIRIN 81 MG: 81 TABLET, CHEWABLE ORAL at 08:25

## 2021-12-19 RX ADMIN — TRAZODONE HYDROCHLORIDE 50 MG: 50 TABLET ORAL at 20:14

## 2021-12-19 RX ADMIN — ATORVASTATIN CALCIUM 20 MG: 10 TABLET, FILM COATED ORAL at 20:14

## 2021-12-19 RX ADMIN — DORZOLAMIDE HYDROCHLORIDE AND TIMOLOL MALEATE 1 DROP: 20; 5 SOLUTION/ DROPS OPHTHALMIC at 08:26

## 2021-12-19 RX ADMIN — SERTRALINE 150 MG: 100 TABLET, FILM COATED ORAL at 20:14

## 2021-12-19 ASSESSMENT — PAIN SCALES - GENERAL
PAINLEVEL_OUTOF10: 0

## 2021-12-19 NOTE — BH NOTE
Pt became extremely anxious after another patient stood at her doorway. Several attempts to calm pt were unsuccessful. Pt was having difficulty verbalizing what she was feeling and when she tried to speak she stuttered. Writer gave her PRN Ativan 0.5mg per order. During administration writer asked if she was familiar with ativan. She spoke clearly and without stutter, \"yes, I've been prescribed it off and on\". Pt then laid down in bed.

## 2021-12-19 NOTE — BH NOTE
Purposeful Rounding    Patient concerns reported:NONE NOTED.  PT IN BED SLEEPING    Nurse made aware:NO    Patient Turned and repositioned: Independent    Patient Toileted: Independent    Fall Precautions in Place: Yellow non-skid socks on, Bed locked in low position, 1/2 bed rails up, Lighting appropriate, Room free of clutter and Clear path to bathroom      Electronically signed by Gabi Danielson on 12/19/21 at 3:14 AM EST

## 2021-12-19 NOTE — FLOWSHEET NOTE
Senior Purposeful Rounding    Position:Back    Physical Environment:Room free from clutter, Clear path to bathroom  and Adequate lighting    Pain Rating/ Nonverbal Pain Behaviors:denies; 0    Pain interventions Attempted: N/A    Patient Toileted:No- Void

## 2021-12-19 NOTE — FLOWSHEET NOTE
Senior Purposeful Rounding    Position:Back    Physical Environment:Room free from clutter, Clear path to bathroom , Adequate lighting and No safety hazards noted    Pain Rating/ Nonverbal Pain Behaviors:denies; 0    Pain interventions Attempted: None    Patient Toileted:No- Void

## 2021-12-19 NOTE — FLOWSHEET NOTE
Senior Purposeful Rounding    Position:Sitting    Physical Environment:Room free from clutter, Clear path to bathroom  and Adequate lighting    Pain Rating/ Nonverbal Pain Behaviors:denies; 0    Pain interventions Attempted: N/A    Patient Toileted:No- Void

## 2021-12-19 NOTE — PROGRESS NOTES
Behavioral Services  Medicare Certification Upon Admission    I certify that this patient's inpatient psychiatric hospital admission is medically necessary for:    [x] (1) Treatment which could reasonably be expected to improve this patient's condition,       [x] (2) Or for diagnostic study;     AND     [x](2) The inpatient psychiatric services are provided while the individual is under the care of a physician and are included in the individualized plan of care.     Estimated length of stay/service 2-3 d    Plan for post-hospital care outpt    Electronically signed by Laverne Funes MD on 12/19/2021 at 8:59 AM

## 2021-12-19 NOTE — PROGRESS NOTES
Department of Psychiatry  AttendingProgress Note  Chief Complaint: anxiety   Sole Knight was on the edge of her bed today . She was pleasant but very deliberate with her responses. Admittd to feeling anxious around the other male patients as she state that one came to her doorway. She feels that groups make her nervous and scared.  visited yesterdya. Slepp \"on and off. \"   Patient's chart was reviewed and collaborated with  about the treatment plan. SUBJECTIVE:    Patient is feeling unchanged. Suicidal ideation:  denies suicidal ideation. Patient does not have medication side effects. ROS: Patient has new complaints: no  Sleeping adequately:  No:    Appetite adequate: Yes  Attending groups: No:   Visitors:Yes    OBJECTIVE    Physical  VITALS:  BP (!) 140/73   Pulse 64   Temp 97.1 °F (36.2 °C) (Temporal)   Resp 14   Ht 5' (1.524 m)   Wt 180 lb 6.4 oz (81.8 kg)   LMP  (LMP Unknown)   SpO2 98%   BMI 35.23 kg/m²     Mental Status Examination:  Patients appearance was street clothes. Thoughts are Paucity of Ideas. Homicidal ideations none. No abnormal movements, tics or mannerisms. Memory intact Aims 0. Concentration Fair. Alert and oriented X 4. Insight and Judgement impaired insight. Patient was cooperative.  Patient gait normal. Mood constricted, affect flat affect Hallucinations Absent, suicidal ideations no specific plan to harm self Speech increased latency of response  Data  Labs:   Admission on 12/16/2021   Component Date Value Ref Range Status    POC Glucose 12/17/2021 168* 70 - 99 mg/dl Final    Performed on 12/17/2021 ACCU-CHEK   Final    POC Glucose 12/17/2021 141* 70 - 99 mg/dl Final    Performed on 12/17/2021 ACCU-CHEK   Final    Potassium 12/18/2021 2.9* 3.5 - 5.1 mmol/L Final    Magnesium 12/18/2021 1.90  1.80 - 2.40 mg/dL Final    Potassium 12/18/2021 4.0  3.5 - 5.1 mmol/L Final    POC Glucose 12/18/2021 122* 70 - 99 mg/dl Final    Performed on 12/18/2021 ACCU-CHEK Final    POC Glucose 12/18/2021 116* 70 - 99 mg/dl Final    Performed on 12/18/2021 ACCU-CHEK   Final    Sodium 12/19/2021 141  136 - 145 mmol/L Final    Potassium reflex Magnesium 12/19/2021 3.4* 3.5 - 5.1 mmol/L Final    Chloride 12/19/2021 104  99 - 110 mmol/L Final    CO2 12/19/2021 26  21 - 32 mmol/L Final    Anion Gap 12/19/2021 11  3 - 16 Final    Glucose 12/19/2021 107* 70 - 99 mg/dL Final    BUN 12/19/2021 21* 7 - 20 mg/dL Final    CREATININE 12/19/2021 1.0  0.6 - 1.2 mg/dL Final    GFR Non- 12/19/2021 54* >60 Final    Comment: >60 mL/min/1.73m2 EGFR, calc. for ages 25 and older using the  MDRD formula (not corrected for weight), is valid for stable  renal function.  GFR  12/19/2021 >60  >60 Final    Comment: Chronic Kidney Disease: less than 60 ml/min/1.73 sq.m. Kidney Failure: less than 15 ml/min/1.73 sq.m. Results valid for patients 18 years and older.       Calcium 12/19/2021 10.0  8.3 - 10.6 mg/dL Final    POC Glucose 12/19/2021 103* 70 - 99 mg/dl Final    Performed on 12/19/2021 ACCU-CHEK   Final    Magnesium 12/19/2021 1.90  1.80 - 2.40 mg/dL Final            Medications  Current Facility-Administered Medications: LORazepam (ATIVAN) tablet 0.5 mg, 0.5 mg, Oral, BID PRN  acetaminophen (TYLENOL) tablet 650 mg, 650 mg, Oral, Q4H PRN  ibuprofen (ADVIL;MOTRIN) tablet 400 mg, 400 mg, Oral, Q6H PRN  magnesium hydroxide (MILK OF MAGNESIA) 400 MG/5ML suspension 30 mL, 30 mL, Oral, Daily PRN  aluminum & magnesium hydroxide-simethicone (MAALOX) 200-200-20 MG/5ML suspension 30 mL, 30 mL, Oral, Q6H PRN  sterile water injection 2.1 mL, 2.1 mL, IntraMUSCular, Q4H PRN  benztropine mesylate (COGENTIN) injection 2 mg, 2 mg, IntraMUSCular, BID PRN  nicotine polacrilex (COMMIT) lozenge 2 mg, 2 mg, Oral, Q1H PRN  traZODone (DESYREL) tablet 50 mg, 50 mg, Oral, Nightly PRN  hydrOXYzine (VISTARIL) capsule 50 mg, 50 mg, Oral, Q6H PRN  OLANZapine (ZYPREXA) tablet 10 mg, 10 mg, Oral, Q8H PRN **OR** OLANZapine (ZYPREXA) injection 10 mg, 10 mg, IntraMUSCular, Q8H PRN  aspirin chewable tablet 81 mg, 81 mg, Oral, Daily  sertraline (ZOLOFT) tablet 150 mg, 150 mg, Oral, Nightly  dorzolamide-timolol (COSOPT) 22.3-6.8 MG/ML ophthalmic solution 1 drop, 1 drop, Ophthalmic, BID  atorvastatin (LIPITOR) tablet 20 mg, 20 mg, Oral, Nightly  metFORMIN (GLUCOPHAGE) tablet 500 mg, 500 mg, Oral, BID WC  metoprolol succinate (TOPROL XL) extended release tablet 25 mg, 25 mg, Oral, Daily  metoprolol succinate (TOPROL XL) extended release tablet 100 mg, 100 mg, Oral, Daily  QUEtiapine (SEROQUEL) tablet 50 mg, 50 mg, Oral, Nightly  QUEtiapine (SEROQUEL) tablet 25 mg, 25 mg, Oral, Daily  amLODIPine (NORVASC) tablet 5 mg, 5 mg, Oral, Daily  latanoprost (XALATAN) 0.005 % ophthalmic solution 1 drop, 1 drop, Both Eyes, Nightly  losartan (COZAAR) tablet 100 mg, 100 mg, Oral, Daily **AND** hydroCHLOROthiazide (HYDRODIURIL) tablet 25 mg, 25 mg, Oral, Daily    ASSESSMENT AND PLAN    Principal Problem:    Generalized anxiety disorder  Active Problems:    Type 2 diabetes mellitus without complication, without long-term current use of insulin (HCC)    Major depression, single episode    Hypokalemia    Hypomagnesemia    Asymptomatic bacteriuria  Resolved Problems:    * No resolved hospital problems. *       1. Patient s symptoms   show no change  2. Probable discharge is next week  3. Discharge planning is incomplete  4. Suicidal ideation is none  5. Total time with patient was 40 minutes and more than 50 % of that time was spent counseling the patient on their symptoms, treatment and expected goals.

## 2021-12-19 NOTE — PLAN OF CARE
Problem: Falls - Risk of:  Goal: Will remain free from falls  Description: Will remain free from falls  Outcome: Ongoing     Problem: Altered Mood, Depressive Behavior:  Goal: Able to verbalize acceptance of life and situations over which he or she has no control  Description: Able to verbalize acceptance of life and situations over which he or she has no control  Outcome: Ongoing     Problem: Nutrition  Goal: Optimal nutrition therapy  Outcome: Ongoing     Pt wearing nonskid socks. Has been free from falls during this shift. Pt stated she feels weak and is fearful of falling, however, when ambulating her gait is steady. She has been seclusive to her room all shift except when visiting  and taking a walk down the faye with writer. She becomes extremely anxious when encouraged to come out of her room but finally did so after significant prompting and reassurance. Writer tried numerous times to assist her with a shower but she declined. She did, however, brush her hair and teeth without prompting. Stated she would shower after her  visited. Initially she was calm and able to have a clear conversation with writer but as the day progressed she became increasingly anxious as she was hyperfocused on the shower she promised to take after  left. Her speech becomes stuttered and has difficulty expressing herself verbally when anxious. Oral intake has improved as she ate the majority of her meals today.

## 2021-12-19 NOTE — FLOWSHEET NOTE
Senior Purposeful Rounding     Position:Repositions Self     Physical Environment:Room free from clutter, Clear path to bathroom , Adequate lighting and No safety hazards noted     Pain Rating/ Nonverbal Pain Behaviors:denies;      Pain interventions Attempted: none     Patient Toileted:Independent

## 2021-12-19 NOTE — FLOWSHEET NOTE
Senior Purposeful Rounding    Position:Sitting-in dayroom with     Physical Environment:Room free from clutter, Clear path to bathroom  and Adequate lighting    Pain Rating/ Nonverbal Pain Behaviors:denies; o    Pain interventions Attempted: N/A    Patient Toileted:No- Void

## 2021-12-19 NOTE — FLOWSHEET NOTE
Senior Purposeful Rounding    Position:Ambulating in faye-required a significant amount of encouragement to walk briefly in the faye with writer. Gait steady. Physical Environment:Adequate lighting and No safety hazards noted    Pain Rating/ Nonverbal Pain Behaviors:denies; 0    Pain interventions Attempted: N/A    Patient Toileted:Continent-voided large amount of urine in toilet.

## 2021-12-19 NOTE — FLOWSHEET NOTE
Senior Purposeful Rounding    Position:Sitting    Physical Environment:Room free from clutter, Clear path to bathroom  and Adequate lighting    Pain Rating/ Nonverbal Pain Behaviors:denies; o    Pain interventions Attempted: N/A    Patient Toileted:No- Void

## 2021-12-19 NOTE — BH NOTE
Purposeful Rounding    Patient concerns reported:NONE NOTED.  PT IN BED SLEEPING    Nurse made aware:NO    Patient Turned and repositioned: Independent    Patient Toileted: Independent    Fall Precautions in Place: Yellow non-skid socks on, Bed locked in low position, 1/2 bed rails up, Lighting appropriate, Room free of clutter and Clear path to bathroom      Electronically signed by Jhon Barraza on 12/19/21 at 5:08 AM EST

## 2021-12-19 NOTE — FLOWSHEET NOTE
Senior Purposeful Rounding    Position:Back    Physical Environment:Room free from clutter, Clear path to bathroom  and Adequate lighting    Pain Rating/ Nonverbal Pain Behaviors:denies; 0    Pain interventions Attempted: N/A    Patient Toileted:No- Void. Writer encouraged her to use the restroom. Stated she urinated very early this morning and did not have to go. She has not been incontinent.

## 2021-12-19 NOTE — PROGRESS NOTES
Pt very anxious and fearful at the beginning of the shift. Trazodone PRN given with HS meds and Ativan PRN given for anxiety @ 2032. Medications were effective.

## 2021-12-19 NOTE — BH NOTE
Purposeful Rounding    Patient concerns reported:NONE NOTED.  PT IN BED RESTING    Nurse made aware:NO    Patient Turned and repositioned: Independent    Patient Toileted: Independent    Fall Precautions in Place: Yellow non-skid socks on, Bed locked in low position, 1/2 bed rails up, Lighting appropriate, Room free of clutter and Clear path to bathroom      Electronically signed by Mercedes Shaffer on 12/18/21 at 9:11 PM EST

## 2021-12-19 NOTE — FLOWSHEET NOTE
Senior Purposeful Rounding    Position:Sitting    Physical Environment:Room free from clutter, Clear path to bathroom  and Adequate lighting    Pain Rating/ Nonverbal Pain Behaviors:denies; 0    Pain interventions Attempted: None    Patient Toileted:No- Void

## 2021-12-19 NOTE — PLAN OF CARE
Pt A+Ox3, anxious, cooperative, and medication compliant. She is isolative to room. She denies SI/HI/AVH and no RTIS noted.

## 2021-12-20 LAB
GLUCOSE BLD-MCNC: 115 MG/DL (ref 70–99)
GLUCOSE BLD-MCNC: 125 MG/DL (ref 70–99)
GLUCOSE BLD-MCNC: 129 MG/DL (ref 70–99)
PERFORMED ON: ABNORMAL

## 2021-12-20 PROCEDURE — 1240000000 HC EMOTIONAL WELLNESS R&B

## 2021-12-20 PROCEDURE — 6370000000 HC RX 637 (ALT 250 FOR IP): Performed by: PSYCHIATRY & NEUROLOGY

## 2021-12-20 PROCEDURE — 6370000000 HC RX 637 (ALT 250 FOR IP)

## 2021-12-20 PROCEDURE — 99233 SBSQ HOSP IP/OBS HIGH 50: CPT

## 2021-12-20 RX ADMIN — HYDROCHLOROTHIAZIDE 25 MG: 25 TABLET ORAL at 09:35

## 2021-12-20 RX ADMIN — ASPIRIN 81 MG: 81 TABLET, CHEWABLE ORAL at 09:32

## 2021-12-20 RX ADMIN — METFORMIN HYDROCHLORIDE 500 MG: 500 TABLET ORAL at 17:05

## 2021-12-20 RX ADMIN — LORAZEPAM 0.5 MG: 0.5 TABLET ORAL at 23:09

## 2021-12-20 RX ADMIN — METOPROLOL SUCCINATE 100 MG: 50 TABLET, EXTENDED RELEASE ORAL at 09:36

## 2021-12-20 RX ADMIN — AMLODIPINE BESYLATE 5 MG: 5 TABLET ORAL at 09:34

## 2021-12-20 RX ADMIN — LATANOPROST 1 DROP: 50 SOLUTION OPHTHALMIC at 20:15

## 2021-12-20 RX ADMIN — ATORVASTATIN CALCIUM 20 MG: 10 TABLET, FILM COATED ORAL at 20:15

## 2021-12-20 RX ADMIN — MAGNESIUM HYDROXIDE 30 ML: 400 SUSPENSION ORAL at 10:22

## 2021-12-20 RX ADMIN — QUETIAPINE FUMARATE 25 MG: 25 TABLET ORAL at 09:32

## 2021-12-20 RX ADMIN — SERTRALINE 150 MG: 100 TABLET, FILM COATED ORAL at 20:15

## 2021-12-20 RX ADMIN — METOPROLOL SUCCINATE 25 MG: 25 TABLET, EXTENDED RELEASE ORAL at 09:34

## 2021-12-20 RX ADMIN — METFORMIN HYDROCHLORIDE 500 MG: 500 TABLET ORAL at 09:35

## 2021-12-20 RX ADMIN — TRAZODONE HYDROCHLORIDE 50 MG: 50 TABLET ORAL at 20:15

## 2021-12-20 RX ADMIN — QUETIAPINE FUMARATE 50 MG: 25 TABLET ORAL at 20:15

## 2021-12-20 RX ADMIN — DORZOLAMIDE HYDROCHLORIDE AND TIMOLOL MALEATE 1 DROP: 20; 5 SOLUTION/ DROPS OPHTHALMIC at 09:35

## 2021-12-20 RX ADMIN — DORZOLAMIDE HYDROCHLORIDE AND TIMOLOL MALEATE 1 DROP: 20; 5 SOLUTION/ DROPS OPHTHALMIC at 20:15

## 2021-12-20 RX ADMIN — LOSARTAN POTASSIUM 100 MG: 100 TABLET, FILM COATED ORAL at 09:35

## 2021-12-20 ASSESSMENT — PAIN SCALES - GENERAL
PAINLEVEL_OUTOF10: 0

## 2021-12-20 NOTE — PLAN OF CARE
Problem: Altered Mood, Depressive Behavior:  Goal: Able to verbalize acceptance of life and situations over which he or she has no control  Description: Able to verbalize acceptance of life and situations over which he or she has no control  Outcome: Ongoing  Goal: Able to verbalize and/or display a decrease in depressive symptoms  Description: Able to verbalize and/or display a decrease in depressive symptoms  Outcome: Ongoing  Goal: Ability to disclose and discuss suicidal ideas will improve  Description: Ability to disclose and discuss suicidal ideas will improve  Outcome: Ongoing  Goal: Able to verbalize support systems  Description: Able to verbalize support systems  Outcome: Ongoing    Pt is alert and oriented x4. Continues to be severely anxious, remains tremulous, stutters when answering questions causing delayed answers and sometimes unable to answer questions at all. Pt still has difficulty making any decisions, even very small ones, and gets easily overwhelmed when having to make decisions. Pt also is very easily overstimulated. Pt reported her anxiety to be a 5/10 which was an improvement from previous shifts and pt turned down any PRN's for anxiety when offered by writer multiple times. Improved appetite. Medication compliant whole. Pt is up w/ SBA due to some weakness in lower extremities. Pt denies SI/HI/AVH, no RTIS noted. Denies pain at this time. Will continue to monitor.

## 2021-12-20 NOTE — BH NOTE
Incoming call from pt's  Minh Garcia.      He reported pt seemed to have a really good day on Saturday and seemed to be about 75% back to herself, but she only seemed about 60% on Sunday. He stated pt does not have a significant psych hx prior to 2016. She did have some problems once in 1995 due to \"sleep deprivation\" that resulted in severe anxiety. She had no other problems until 2016, and she has had 4 episodes since then. There were significant stressors and losses triggering that first episode in 2016, but there has not been any significant precipitating event this time. Ativan has helped \"bring her out of it\" in the past. She would be on the Ativan for a little while, then be weaned off it, and do well for awhile before the net episode hit. She sees Dr. Yarelis Ramirez as an out-pt psychiatrist, and he has recommended psychological testing. They plan to do this at Dr Elías Lizama office in January. The testing will be to get a better understanding of her mental illness and to see if she is any dementia related symptoms. 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

## 2021-12-20 NOTE — PROGRESS NOTES
Department of Psychiatry  AttendingProgress Note  Chief Complaint: Anxiety  Patient's chart was reviewed and collaborated with  about the treatment plan. SUBJECTIVE:    Pt lying in bed, resting eyes, closed. Pt woke easily and was able to sit up to have a conversation. Pt states that she is feeling a \"little better\", and less confused than she was when she first arrived. Speech is clear, and no stuttering or hesitation noticed in conversation. Pt states that she still feels a little scared being on the unit, especially around the other patients. Pt states that she feels like the medications are helping with her anxiety. Pt asked if she has been participating in groups, and pt states she has tried, but felt nervous. Pt asked about PT, but pt states she was not feeling up to it today. Pt states she slept \"ok\" last night. Patient is feeling better. Suicidal ideation:  denies suicidal ideation. Patient does not have medication side effects. ROS: Patient has new complaints: no  Sleeping adequately:  Yes   Appetite adequate: Yes  Attending groups: No: but states that she will try  Visitors:Yes,     OBJECTIVE    Physical  VITALS:  /80   Pulse 64   Temp 97.5 °F (36.4 °C) (Temporal)   Resp 18   Ht 5' (1.524 m)   Wt 180 lb 6.4 oz (81.8 kg)   LMP  (LMP Unknown)   SpO2 97%   BMI 35.23 kg/m²     Mental Status Examination:  Patients appearance was well-appearing and seated in bed. Thoughts are suppressed at times, timid. Homicidal ideations none. No abnormal movements, tics or mannerisms. Memory unable to determine at this time Aims 0. Concentration Fair. Alert and oriented X 4. Insight and Judgement normal insight and judgment. Patient was cooperative. Patient gait in bed.  Mood anxious, affect anxiety Hallucinations Absent, suicidal ideations no specific plan to harm self Speech soft  Data  Labs:   Admission on 12/16/2021   Component Date Value Ref Range Status    POC Glucose 12/17/2021 168* 70 - 99 mg/dl Final    Performed on 12/17/2021 ACCU-CHEK   Final    POC Glucose 12/17/2021 141* 70 - 99 mg/dl Final    Performed on 12/17/2021 ACCU-CHEK   Final    Potassium 12/18/2021 2.9* 3.5 - 5.1 mmol/L Final    Magnesium 12/18/2021 1.90  1.80 - 2.40 mg/dL Final    Potassium 12/18/2021 4.0  3.5 - 5.1 mmol/L Final    POC Glucose 12/18/2021 122* 70 - 99 mg/dl Final    Performed on 12/18/2021 ACCU-CHEK   Final    POC Glucose 12/18/2021 116* 70 - 99 mg/dl Final    Performed on 12/18/2021 ACCU-CHEK   Final    Sodium 12/19/2021 141  136 - 145 mmol/L Final    Potassium reflex Magnesium 12/19/2021 3.4* 3.5 - 5.1 mmol/L Final    Chloride 12/19/2021 104  99 - 110 mmol/L Final    CO2 12/19/2021 26  21 - 32 mmol/L Final    Anion Gap 12/19/2021 11  3 - 16 Final    Glucose 12/19/2021 107* 70 - 99 mg/dL Final    BUN 12/19/2021 21* 7 - 20 mg/dL Final    CREATININE 12/19/2021 1.0  0.6 - 1.2 mg/dL Final    GFR Non- 12/19/2021 54* >60 Final    Comment: >60 mL/min/1.73m2 EGFR, calc. for ages 25 and older using the  MDRD formula (not corrected for weight), is valid for stable  renal function.  GFR  12/19/2021 >60  >60 Final    Comment: Chronic Kidney Disease: less than 60 ml/min/1.73 sq.m. Kidney Failure: less than 15 ml/min/1.73 sq.m. Results valid for patients 18 years and older.       Calcium 12/19/2021 10.0  8.3 - 10.6 mg/dL Final    POC Glucose 12/19/2021 103* 70 - 99 mg/dl Final    Performed on 12/19/2021 ACCU-CHEK   Final    Magnesium 12/19/2021 1.90  1.80 - 2.40 mg/dL Final    POC Glucose 12/19/2021 130* 70 - 99 mg/dl Final    Performed on 12/19/2021 ACCU-CHEK   Final    POC Glucose 12/20/2021 125* 70 - 99 mg/dl Final    Performed on 12/20/2021 ACCU-CHEK   Final    POC Glucose 12/20/2021 115* 70 - 99 mg/dl Final    Performed on 12/20/2021 ACCU-CHEK   Final            Medications  Current Facility-Administered Medications: LORazepam (ATIVAN) tablet 0.5 mg, 0.5 mg, Oral, BID PRN  acetaminophen (TYLENOL) tablet 650 mg, 650 mg, Oral, Q4H PRN  ibuprofen (ADVIL;MOTRIN) tablet 400 mg, 400 mg, Oral, Q6H PRN  magnesium hydroxide (MILK OF MAGNESIA) 400 MG/5ML suspension 30 mL, 30 mL, Oral, Daily PRN  aluminum & magnesium hydroxide-simethicone (MAALOX) 200-200-20 MG/5ML suspension 30 mL, 30 mL, Oral, Q6H PRN  sterile water injection 2.1 mL, 2.1 mL, IntraMUSCular, Q4H PRN  benztropine mesylate (COGENTIN) injection 2 mg, 2 mg, IntraMUSCular, BID PRN  nicotine polacrilex (COMMIT) lozenge 2 mg, 2 mg, Oral, Q1H PRN  traZODone (DESYREL) tablet 50 mg, 50 mg, Oral, Nightly PRN  hydrOXYzine (VISTARIL) capsule 50 mg, 50 mg, Oral, Q6H PRN  OLANZapine (ZYPREXA) tablet 10 mg, 10 mg, Oral, Q8H PRN **OR** OLANZapine (ZYPREXA) injection 10 mg, 10 mg, IntraMUSCular, Q8H PRN  aspirin chewable tablet 81 mg, 81 mg, Oral, Daily  sertraline (ZOLOFT) tablet 150 mg, 150 mg, Oral, Nightly  dorzolamide-timolol (COSOPT) 22.3-6.8 MG/ML ophthalmic solution 1 drop, 1 drop, Ophthalmic, BID  atorvastatin (LIPITOR) tablet 20 mg, 20 mg, Oral, Nightly  metFORMIN (GLUCOPHAGE) tablet 500 mg, 500 mg, Oral, BID WC  metoprolol succinate (TOPROL XL) extended release tablet 25 mg, 25 mg, Oral, Daily  metoprolol succinate (TOPROL XL) extended release tablet 100 mg, 100 mg, Oral, Daily  QUEtiapine (SEROQUEL) tablet 50 mg, 50 mg, Oral, Nightly  QUEtiapine (SEROQUEL) tablet 25 mg, 25 mg, Oral, Daily  amLODIPine (NORVASC) tablet 5 mg, 5 mg, Oral, Daily  latanoprost (XALATAN) 0.005 % ophthalmic solution 1 drop, 1 drop, Both Eyes, Nightly  losartan (COZAAR) tablet 100 mg, 100 mg, Oral, Daily **AND** hydroCHLOROthiazide (HYDRODIURIL) tablet 25 mg, 25 mg, Oral, Daily    ASSESSMENT AND PLAN    Principal Problem:    Generalized anxiety disorder  Active Problems:    Type 2 diabetes mellitus without complication, without long-term current use of insulin (HCC)    Major depression, single episode Hypokalemia    Hypomagnesemia    Asymptomatic bacteriuria  Resolved Problems:    * No resolved hospital problems. *       1. Patient s symptoms   are improving  2. Probable discharge is this week  3. Discharge planning is incomplete  4. Suicidal ideation is unchanged, none present  5. Total time with patient was 40 minutes and more than 50 % of that time was spent counseling the patient on their symptoms, treatment and expected goals.      Brandt KAT-DUTCH

## 2021-12-20 NOTE — PROGRESS NOTES
Occupational Therapy  Attempted to see patient this am, but patient declined. Patient sleeping upon entry and awakened easily, thought about participating in therapy but then declined. States she wants to sleep more at this time. Will continue to follow.   Jim Brunson, OTR/L [id number]

## 2021-12-20 NOTE — PROGRESS NOTES
Pt c/o stomach cramps and constipation. She reports she has not had a BM since she was first admitted. Given milk of magnesia @ 1022. This was effective this afternoon. Pt was continent of a BM x1. BM was hard, formed. Pt reports some improvement in her stomach cramps. Will continue to monitor.

## 2021-12-20 NOTE — PROGRESS NOTES
4 Eyes Skin Assessment     The patient is being assessed for  Admission    I agree that 2 RN's have performed a thorough Head to Toe Skin Assessment on the patient. ALL assessment sites listed below have been assessed. Areas assessed for pressure by both nurses:   [x]   Head, Face, and Ears   [x]   Shoulders, Back, and Chest  [x]   Arms, Elbows, and Hands   [x]   Coccyx, Sacrum, and Ischum  [x]   Legs, Feet, and Heels                                Skin Assessed Under all Medical Devices by both nurses:             All Mepilex Borders were peeled back and area peeked at by both nurses:  n/a  Please list where Mepilex Borders are located:  none                 Does the Patient have Skin Breakdown related to pressure?   No              Philippe Prevention initiated:  No   Wound Care Orders initiated:  No      [facility] nurse consulted for Pressure Injury (Stage 3,4, Unstageable, DTI, NWPT, Complex wounds)and New or Established Ostomies:  No        Nurse 1 eSignature: Electronically signed by Velia Ruiz RN on 12/20/21 at 1:48 AM EST    **SHARE this note so that the co-signing nurse is able to place an eSignature**    Nurse 2 eSignature: {Esignature:[id number]}

## 2021-12-20 NOTE — PLAN OF CARE
Pt A+Ox4, very anxious, fearful, but cooperative and medication compliant. Trazodone PRN given with HS meds. She denies SI/HI/AVH and no RTIS noted. She is isolated to room and denies any needs at this time.

## 2021-12-21 LAB
ANION GAP SERPL CALCULATED.3IONS-SCNC: 10 MMOL/L (ref 3–16)
BUN BLDV-MCNC: 16 MG/DL (ref 7–20)
CALCIUM SERPL-MCNC: 10.6 MG/DL (ref 8.3–10.6)
CHLORIDE BLD-SCNC: 102 MMOL/L (ref 99–110)
CO2: 30 MMOL/L (ref 21–32)
CREAT SERPL-MCNC: 0.9 MG/DL (ref 0.6–1.2)
GFR AFRICAN AMERICAN: >60
GFR NON-AFRICAN AMERICAN: >60
GLUCOSE BLD-MCNC: 108 MG/DL (ref 70–99)
GLUCOSE BLD-MCNC: 126 MG/DL (ref 70–99)
GLUCOSE BLD-MCNC: 99 MG/DL (ref 70–99)
MAGNESIUM: 1.9 MG/DL (ref 1.8–2.4)
PERFORMED ON: ABNORMAL
PERFORMED ON: NORMAL
POTASSIUM REFLEX MAGNESIUM: 3.2 MMOL/L (ref 3.5–5.1)
SODIUM BLD-SCNC: 142 MMOL/L (ref 136–145)

## 2021-12-21 PROCEDURE — 6370000000 HC RX 637 (ALT 250 FOR IP): Performed by: PSYCHIATRY & NEUROLOGY

## 2021-12-21 PROCEDURE — 97116 GAIT TRAINING THERAPY: CPT

## 2021-12-21 PROCEDURE — 1240000000 HC EMOTIONAL WELLNESS R&B

## 2021-12-21 PROCEDURE — 97161 PT EVAL LOW COMPLEX 20 MIN: CPT

## 2021-12-21 PROCEDURE — 6370000000 HC RX 637 (ALT 250 FOR IP)

## 2021-12-21 PROCEDURE — 36415 COLL VENOUS BLD VENIPUNCTURE: CPT

## 2021-12-21 PROCEDURE — 80048 BASIC METABOLIC PNL TOTAL CA: CPT

## 2021-12-21 PROCEDURE — 99233 SBSQ HOSP IP/OBS HIGH 50: CPT

## 2021-12-21 PROCEDURE — 83735 ASSAY OF MAGNESIUM: CPT

## 2021-12-21 RX ORDER — POTASSIUM CHLORIDE 20 MEQ/1
20 TABLET, EXTENDED RELEASE ORAL
Status: DISCONTINUED | OUTPATIENT
Start: 2021-12-21 | End: 2021-12-30 | Stop reason: HOSPADM

## 2021-12-21 RX ORDER — DIAZEPAM 2 MG/1
2 TABLET ORAL 2 TIMES DAILY
Status: DISCONTINUED | OUTPATIENT
Start: 2021-12-21 | End: 2021-12-29

## 2021-12-21 RX ADMIN — LORAZEPAM 0.5 MG: 0.5 TABLET ORAL at 12:23

## 2021-12-21 RX ADMIN — QUETIAPINE FUMARATE 50 MG: 25 TABLET ORAL at 20:30

## 2021-12-21 RX ADMIN — SERTRALINE 150 MG: 100 TABLET, FILM COATED ORAL at 20:30

## 2021-12-21 RX ADMIN — HYDROCHLOROTHIAZIDE 25 MG: 25 TABLET ORAL at 10:31

## 2021-12-21 RX ADMIN — METFORMIN HYDROCHLORIDE 500 MG: 500 TABLET ORAL at 17:54

## 2021-12-21 RX ADMIN — DORZOLAMIDE HYDROCHLORIDE AND TIMOLOL MALEATE 1 DROP: 20; 5 SOLUTION/ DROPS OPHTHALMIC at 10:30

## 2021-12-21 RX ADMIN — POTASSIUM CHLORIDE 20 MEQ: 1500 TABLET, EXTENDED RELEASE ORAL at 13:16

## 2021-12-21 RX ADMIN — ASPIRIN 81 MG: 81 TABLET, CHEWABLE ORAL at 10:32

## 2021-12-21 RX ADMIN — ATORVASTATIN CALCIUM 20 MG: 10 TABLET, FILM COATED ORAL at 20:30

## 2021-12-21 RX ADMIN — METOPROLOL SUCCINATE 100 MG: 50 TABLET, EXTENDED RELEASE ORAL at 10:31

## 2021-12-21 RX ADMIN — LATANOPROST 1 DROP: 50 SOLUTION OPHTHALMIC at 20:31

## 2021-12-21 RX ADMIN — DORZOLAMIDE HYDROCHLORIDE AND TIMOLOL MALEATE 1 DROP: 20; 5 SOLUTION/ DROPS OPHTHALMIC at 20:31

## 2021-12-21 RX ADMIN — METOPROLOL SUCCINATE 25 MG: 25 TABLET, EXTENDED RELEASE ORAL at 10:30

## 2021-12-21 RX ADMIN — QUETIAPINE FUMARATE 25 MG: 25 TABLET ORAL at 10:32

## 2021-12-21 RX ADMIN — AMLODIPINE BESYLATE 5 MG: 5 TABLET ORAL at 10:31

## 2021-12-21 RX ADMIN — LOSARTAN POTASSIUM 100 MG: 100 TABLET, FILM COATED ORAL at 10:31

## 2021-12-21 RX ADMIN — METFORMIN HYDROCHLORIDE 500 MG: 500 TABLET ORAL at 10:31

## 2021-12-21 ASSESSMENT — PAIN SCALES - GENERAL
PAINLEVEL_OUTOF10: 0

## 2021-12-21 NOTE — FLOWSHEET NOTE
Senior Purposeful Rounding     Position:Repositions Self     Physical Environment:Room free from clutter, Clear path to bathroom , Adequate lighting and No safety hazards noted     Pain Rating/ Nonverbal Pain Behaviors: none     Pain interventions Attempted: none     Patient Toileted:Independent

## 2021-12-21 NOTE — PLAN OF CARE
Problem: Altered Mood, Depressive Behavior:  Goal: Able to verbalize acceptance of life and situations over which he or she has no control  Description: Able to verbalize acceptance of life and situations over which he or she has no control  Outcome: Ongoing  Goal: Able to verbalize and/or display a decrease in depressive symptoms  Description: Able to verbalize and/or display a decrease in depressive symptoms  Outcome: Ongoing  Goal: Ability to disclose and discuss suicidal ideas will improve  Description: Ability to disclose and discuss suicidal ideas will improve  Outcome: Ongoing  Goal: Able to verbalize support systems  Description: Able to verbalize support systems  Outcome: Ongoing    Pt is alert and oriented x4. She continues to appear very anxious. Remains isolative to her room but has been opening her door and coming to the doorway at times to make her needs known. Prior to this, she has requested that her door stay closed at all times. Pt is continent, steady gait. Appetite has improved, pt has been eating over 50% of all meals. Pt denies SI/HI/AVH, no RTIS noted. Denies any pain at this time. Will continue to monitor.

## 2021-12-21 NOTE — PROGRESS NOTES
New order for valium scheduled BID. Ativan given @ 0484 31 29 02 just prior to new order therefore valium not given at this time. Writer also attempted to educate pt on new order for valium that she will get tonight. Pt stated that she does not want to take it. When asked why, pt reported that she has heard in the past from other people and doctors that it is not good for you and people easily become dependent on it. Pt was educated on medication and administration safety and medication side effects but pt was still resistant to starting valium tonight at this time.

## 2021-12-21 NOTE — PLAN OF CARE
Pt A+Ox4, anxious, fearful, stutters when trying to express needs. She denies SI/HI/AVH and no RTIS noted. She is isolative to room and denies any needs at this time.

## 2021-12-21 NOTE — BH NOTE
Spoke with pt's  and provided update on pt's status and treatment. He will call pt tomorrow to check in with her.

## 2021-12-21 NOTE — PROGRESS NOTES
Inpatient Physical Therapy Evaluation and Treatment  Discharge Summary    Unit: [facility]  Date:  12/21/2021  Patient Name:    Ge Cueto  Admitting diagnosis:  Major depression, single episode [F32.9]  Admit Date:  12/16/2021  Precautions/Restrictions/WB Status/ Lines/ Wounds/ Oxygen: [facility] precautions    Treatment Time:  10:22 - 10:48  Treatment Number:  1   Timed Code Treatment Minutes: 16 minutes  Total Treatment Minutes:  26  minutes    Patient Goals for Therapy: none stated          Discharge Recommendations: Home PRN assist   DME needs for discharge: Needs Met       Therapy recommendation for EMS Transport: can transport by wheelchair    Therapy recommendations for staff:   Supervision with use of No AD for all transfers and ambulation to/from chair  to/from bathroom  within room  within halls  within community room    History of Present Illness: Corinne Horner, 12/17/2021:   \" Patient seen in room on Adult Behavioral Unit.   Patient is a 67 y. o. female who presents to the [facility] unit from [facility] ED.  Pt came to ED by squad following increasing anxiety that has caused her to be unable to care for herself safely at home and affected sleep and eating as well.  Pt has also not been taking her medications as they were prescribed.  Pt is a poor historian, becoming increasingly anxious while trying to have a conversation.  Pt begins to stutter and become upset when trying to answer questions, stating \"I'm just so confused\".  Pt's  states that he does help with her medications, but did not know how often she should have been taking each medication.  He describes having to help her more with ADL's and she grows increasingly anxious, unable to eat or care for herself. Lum Prost do have home health visit twice a week for PT and bathing.  Pts  does not feel that she is safe at home in her current condition and wants to get her medications straight and help her build her strength back up.  Pt states that she can walk short distances in the home but does not walk much outside of the home.  Pt denies suicidal thoughts. \"     Home Health S4 Level Recommendation:  NA  AM-PAC Mobility Score    AM-PAC Inpatient Mobility Raw Score : 23     Makayla Freitas scored a 23/24 on the AM-PAC short mobility form. Current research shows that an AM-PAC score of 18 or greater is typically associated with a discharge to the patient's home setting. Please see assessment section for further patient specific details. If patient discharges prior to next session this note will serve as a discharge summary. Please see below for the latest assessment towards goals. Preadmission Environment:    Pt. Lives with spouse. Pt has access to  24/7 assistance by spouse. Home environment:  two story home  Steps to enter first floor:  2-3 steps to enter     Steps to second floor:  Full flight of 12-13  Bathroom: walk in shower and shower chair (standard)   Equipment owned:  none     Preadmission Status / PLOF:  History of falls             No  Pt. Able to drive          Yes - Pt reports she has not driven in a while but hopes to get back to it soon. Pt Fully independent with ADL's         No - staff member comes 1x/wk to assist with needs  Pt. Required assistance from family for:  Cleaning, Cooking and Laundry     Pt sleeps in flat bed. Pt. Fully independent for transfers and gait and walked with: No Device  Pt enjoys reading as a hobby. Pt states her spouse can provide 24hr care if needed. Denies falls recently. Pain   No  Location:   Rating: NA /10  Pain Medicine Status: Denies need    Cognition    A&O x4   Able to follow 2 step commands    Subjective  Patient lying supine in bed with no family present. Pt agreeable to this PT eval & tx. Upper Extremity ROM/Strength  Please see OT evaluation.       Lower Extremity ROM / Strength   AROM WFL: Yes  ROM limitations:     BLE strength WFL, but not formally assessed with MMT. Lower Extremity Sensation    [facility]    Lower Extremity Proprioception:   NT    Coordination and Tone  NT    Balance  Sitting:  Normal; Independent  Comments:     Standing: Good ; Independent  Comments:     Bed Mobility   Supine to Sit:    Independent  Sit to Supine:   Independent  Rolling:   Not Tested  Scooting in sitting: Independent  Scooting in supine:  Not Tested    Transfer Training     Sit to stand:   Independent  Stand to sit: Independent  Bed to Chair:   Not Tested with use of N/A    Gait gait completed as indicated below  Distance:      225 ft  Deviations (firm surface/linoleum):  decreased lilia, step through pattern and decreased step length bilaterally  Assistive Device Used:    No AD  Level of Assist:    Independent  Comment: steady, no LOB    Stair Training unable to assess stair climbing 2/2 pt being admitted to a locked unit. [place] inaccessible. Activity Tolerance   Pt completed therapy session with No adverse symptoms noted w/activity    Positioning Needs   Pt in bed, no alarm needed, positioned in proper neutral alignment and pressure relief provided. Call light provided and all needs within reach    Exercises Initiated  Adelita deferred secondary to treatment focus on functional mobility  NA    Other  None. Patient/Family Education   Pt educated on role of inpatient PT, POC, importance of continued activity, DC recommendations, safety awareness and calling for assist with mobility. Assessment  Pt seen for Physical Therapy evaluation in acute care setting. Pt cooperative throughout session, minimally verbal, however answered all PT's questions when asked. Recommending Home PRN assist upon discharge as patient functioning independently    Goals :   No acute PT goals identified. Pt is independent with mobility and is currently functioning at baseline. Rehabilitation Potential: Good    Plan    One time visit.     Signature: Pablito Diaz, PT, DPT, OMT-C

## 2021-12-21 NOTE — PROGRESS NOTES
Department of Psychiatry  AttendingProgress Note  Chief Complaint: \"I feel very anxious\"    Patient's chart was reviewed and collaborated with  about the treatment plan. SUBJECTIVE:    Pt seen, lying in bed. Pt states that she is still very nervous and anxious. Pt having trouble finding her words today, stuttering again when asked questions about her mood. Pt states that staff has asked her about showering, and this makes her very nervous. Pt's  mentioned that the patient is very private, and would often get upset when home health came to give her baths at home. The pt, when asked about the home health baths, states they often wash her arms, legs and back, but would not touch her private areas at her request.  Pt able to hold a good conversation without stuttering or becoming upset when talking about her past and family. Pt states she worked at [employer] for a number of years and worked with and around a lot of people. Pt states that she was never really anxious around large groups until recently. Pt encouraged to attend groups and talk with other patients, but she declined, stating she would prefer not to. Pt states she does look forward to her husbands visits, and spending time with her family at [place]. Patient is feeling unchanged. Suicidal ideation:  denies suicidal ideation. Patient does not have medication side effects. ROS: Patient has new complaints: no  Sleeping adequately:  Yes   Appetite adequate: Yes  Attending groups: No: Pt would prefer not to attend groups  Visitors:Yes    OBJECTIVE    Physical  VITALS:  /77   Pulse 76   Temp 97.3 °F (36.3 °C) (Temporal)   Resp 16   Ht 5' (1.524 m)   Wt 180 lb 6.4 oz (81.8 kg)   LMP  (LMP Unknown)   SpO2 96%   BMI 35.23 kg/m²     Mental Status Examination:  Patients appearance was well-appearing and lying in bed.  Thoughts are Unusual fears and anxiety over social interactiosn with others on the unit. Homicidal ideations none. No abnormal movements, tics or mannerisms. Memory intact Aims 0. Concentration Good. Alert and oriented X 4. Insight and Judgement focused on anxiety and fears. Patient was cooperative. Patient gait unknown, in bed at this time. Mood depressed and anxious, affect anxiety Hallucinations Absent, suicidal ideations no specific plan to harm self Speech soft  Data  Labs:   Admission on 12/16/2021   Component Date Value Ref Range Status    POC Glucose 12/17/2021 168* 70 - 99 mg/dl Final    Performed on 12/17/2021 ACCU-CHEK   Final    POC Glucose 12/17/2021 141* 70 - 99 mg/dl Final    Performed on 12/17/2021 ACCU-CHEK   Final    Potassium 12/18/2021 2.9* 3.5 - 5.1 mmol/L Final    Magnesium 12/18/2021 1.90  1.80 - 2.40 mg/dL Final    Potassium 12/18/2021 4.0  3.5 - 5.1 mmol/L Final    POC Glucose 12/18/2021 122* 70 - 99 mg/dl Final    Performed on 12/18/2021 ACCU-CHEK   Final    POC Glucose 12/18/2021 116* 70 - 99 mg/dl Final    Performed on 12/18/2021 ACCU-CHEK   Final    Sodium 12/19/2021 141  136 - 145 mmol/L Final    Potassium reflex Magnesium 12/19/2021 3.4* 3.5 - 5.1 mmol/L Final    Chloride 12/19/2021 104  99 - 110 mmol/L Final    CO2 12/19/2021 26  21 - 32 mmol/L Final    Anion Gap 12/19/2021 11  3 - 16 Final    Glucose 12/19/2021 107* 70 - 99 mg/dL Final    BUN 12/19/2021 21* 7 - 20 mg/dL Final    CREATININE 12/19/2021 1.0  0.6 - 1.2 mg/dL Final    GFR Non- 12/19/2021 54* >60 Final    Comment: >60 mL/min/1.73m2 EGFR, calc. for ages 25 and older using the  MDRD formula (not corrected for weight), is valid for stable  renal function.  GFR  12/19/2021 >60  >60 Final    Comment: Chronic Kidney Disease: less than 60 ml/min/1.73 sq.m. Kidney Failure: less than 15 ml/min/1.73 sq.m. Results valid for patients 18 years and older.       Calcium 12/19/2021 10.0  8.3 - 10.6 mg/dL Final    POC Glucose 12/19/2021 103* 70 - 99 suspension 30 mL, 30 mL, Oral, Daily PRN  aluminum & magnesium hydroxide-simethicone (MAALOX) 200-200-20 MG/5ML suspension 30 mL, 30 mL, Oral, Q6H PRN  sterile water injection 2.1 mL, 2.1 mL, IntraMUSCular, Q4H PRN  benztropine mesylate (COGENTIN) injection 2 mg, 2 mg, IntraMUSCular, BID PRN  nicotine polacrilex (COMMIT) lozenge 2 mg, 2 mg, Oral, Q1H PRN  traZODone (DESYREL) tablet 50 mg, 50 mg, Oral, Nightly PRN  hydrOXYzine (VISTARIL) capsule 50 mg, 50 mg, Oral, Q6H PRN  OLANZapine (ZYPREXA) tablet 10 mg, 10 mg, Oral, Q8H PRN **OR** OLANZapine (ZYPREXA) injection 10 mg, 10 mg, IntraMUSCular, Q8H PRN  aspirin chewable tablet 81 mg, 81 mg, Oral, Daily  sertraline (ZOLOFT) tablet 150 mg, 150 mg, Oral, Nightly  dorzolamide-timolol (COSOPT) 22.3-6.8 MG/ML ophthalmic solution 1 drop, 1 drop, Ophthalmic, BID  atorvastatin (LIPITOR) tablet 20 mg, 20 mg, Oral, Nightly  metFORMIN (GLUCOPHAGE) tablet 500 mg, 500 mg, Oral, BID WC  metoprolol succinate (TOPROL XL) extended release tablet 25 mg, 25 mg, Oral, Daily  metoprolol succinate (TOPROL XL) extended release tablet 100 mg, 100 mg, Oral, Daily  QUEtiapine (SEROQUEL) tablet 50 mg, 50 mg, Oral, Nightly  QUEtiapine (SEROQUEL) tablet 25 mg, 25 mg, Oral, Daily  amLODIPine (NORVASC) tablet 5 mg, 5 mg, Oral, Daily  latanoprost (XALATAN) 0.005 % ophthalmic solution 1 drop, 1 drop, Both Eyes, Nightly  losartan (COZAAR) tablet 100 mg, 100 mg, Oral, Daily **AND** hydroCHLOROthiazide (HYDRODIURIL) tablet 25 mg, 25 mg, Oral, Daily    ASSESSMENT AND PLAN    Principal Problem:    Generalized anxiety disorder  Active Problems:    Type 2 diabetes mellitus without complication, without long-term current use of insulin ([facility])    Major depression, single episode    Hypokalemia    Hypomagnesemia    Asymptomatic bacteriuria  Resolved Problems:    * No resolved hospital problems. *       1. Patient's symptoms show no change. Plan to switch to a scheduled long acting anxiolytic, DC Ativan. 2. Probable discharge is unknown  3. Discharge planning is incomplete  4. Suicidal ideation is remains none  5. Total time with patient was 40 minutes and more than 50 % of that time was spent counseling the patient on their symptoms, treatment and expected goals.      Katy Padilla - [facility]

## 2021-12-21 NOTE — PROGRESS NOTES
Writer had been discussing getting a shower soon w/ the patient this morning. Writer had previously discussed pt's anxiety about showering yesterday w/ pt. Writer suggested that pt at least be shown the showers this AM so she can be prepared for when she does decide to shower. Pt reported feeling very nervous about getting a shower while she is here. She states that she does not know why she is so nervous but has anxiety about this at home also. PT and PCA took pt to see the showers this morning after which pt became increasingly nervous throughout the rest of the day. Pt given ativan @ 0484 31 [id number] 02 for anxiety. Effective. Pt is currently resting in bed quietly w/ eyes closed. Will continue to monitor.

## 2021-12-22 LAB
GLUCOSE BLD-MCNC: 107 MG/DL (ref 70–99)
GLUCOSE BLD-MCNC: 108 MG/DL (ref 70–99)
PERFORMED ON: ABNORMAL
PERFORMED ON: ABNORMAL

## 2021-12-22 PROCEDURE — 97535 SELF CARE MNGMENT TRAINING: CPT

## 2021-12-22 PROCEDURE — 99233 SBSQ HOSP IP/OBS HIGH 50: CPT

## 2021-12-22 PROCEDURE — 6370000000 HC RX 637 (ALT 250 FOR IP): Performed by: PSYCHIATRY & NEUROLOGY

## 2021-12-22 PROCEDURE — 6370000000 HC RX 637 (ALT 250 FOR IP)

## 2021-12-22 PROCEDURE — 97530 THERAPEUTIC ACTIVITIES: CPT

## 2021-12-22 PROCEDURE — 1240000000 HC EMOTIONAL WELLNESS R&B

## 2021-12-22 RX ORDER — OLANZAPINE 5 MG/1
5 TABLET ORAL EVERY 8 HOURS PRN
Status: DISCONTINUED | OUTPATIENT
Start: 2021-12-22 | End: 2021-12-23

## 2021-12-22 RX ORDER — OLANZAPINE 10 MG/1
5 INJECTION, POWDER, LYOPHILIZED, FOR SOLUTION INTRAMUSCULAR EVERY 8 HOURS PRN
Status: DISCONTINUED | OUTPATIENT
Start: 2021-12-22 | End: 2021-12-23

## 2021-12-22 RX ADMIN — METFORMIN HYDROCHLORIDE 500 MG: 500 TABLET ORAL at 16:23

## 2021-12-22 RX ADMIN — QUETIAPINE FUMARATE 25 MG: 25 TABLET ORAL at 08:59

## 2021-12-22 RX ADMIN — DIAZEPAM 2 MG: 2 TABLET ORAL at 20:36

## 2021-12-22 RX ADMIN — ATORVASTATIN CALCIUM 20 MG: 10 TABLET, FILM COATED ORAL at 20:37

## 2021-12-22 RX ADMIN — SERTRALINE 150 MG: 100 TABLET, FILM COATED ORAL at 20:36

## 2021-12-22 RX ADMIN — DORZOLAMIDE HYDROCHLORIDE AND TIMOLOL MALEATE 1 DROP: 20; 5 SOLUTION/ DROPS OPHTHALMIC at 09:22

## 2021-12-22 RX ADMIN — QUETIAPINE FUMARATE 50 MG: 25 TABLET ORAL at 20:36

## 2021-12-22 RX ADMIN — DIAZEPAM 2 MG: 2 TABLET ORAL at 09:31

## 2021-12-22 RX ADMIN — POTASSIUM CHLORIDE 20 MEQ: 1500 TABLET, EXTENDED RELEASE ORAL at 09:18

## 2021-12-22 RX ADMIN — OLANZAPINE 5 MG: 5 TABLET, FILM COATED ORAL at 16:23

## 2021-12-22 RX ADMIN — LATANOPROST 1 DROP: 50 SOLUTION OPHTHALMIC at 20:33

## 2021-12-22 RX ADMIN — METFORMIN HYDROCHLORIDE 500 MG: 500 TABLET ORAL at 08:56

## 2021-12-22 RX ADMIN — METOPROLOL SUCCINATE 100 MG: 50 TABLET, EXTENDED RELEASE ORAL at 09:05

## 2021-12-22 RX ADMIN — DORZOLAMIDE HYDROCHLORIDE AND TIMOLOL MALEATE 1 DROP: 20; 5 SOLUTION/ DROPS OPHTHALMIC at 20:34

## 2021-12-22 RX ADMIN — LOSARTAN POTASSIUM 100 MG: 100 TABLET, FILM COATED ORAL at 09:01

## 2021-12-22 RX ADMIN — AMLODIPINE BESYLATE 5 MG: 5 TABLET ORAL at 08:56

## 2021-12-22 RX ADMIN — HYDROCHLOROTHIAZIDE 25 MG: 25 TABLET ORAL at 09:01

## 2021-12-22 RX ADMIN — ASPIRIN 81 MG: 81 TABLET, CHEWABLE ORAL at 08:55

## 2021-12-22 RX ADMIN — METOPROLOL SUCCINATE 25 MG: 25 TABLET, EXTENDED RELEASE ORAL at 09:18

## 2021-12-22 ASSESSMENT — PAIN SCALES - GENERAL: PAINLEVEL_OUTOF10: 0

## 2021-12-22 NOTE — FLOWSHEET NOTE
[facility] Rounding    Position:Repositions Self    Physical Environment:Room free from clutter, Clear path to bathroom , Adequate lighting and No safety hazards noted    Pain Rating/ Nonverbal Pain Behaviors:denies; 0    Pain interventions Attempted: n/a    Patient Toileted:Independent

## 2021-12-22 NOTE — BH NOTE
Adamaris Trujillo allowed this nurse to check her blood sugar at bedside. Current blood sugar is 107.

## 2021-12-22 NOTE — BH NOTE
Kalli Granados,  has been continuing to talk with Martina Mendoza. Martina Mendoza has agreed to take medication to help her with this high level of anxiety and agitation. Phone call to provider Laurent Morrison NP, to discuss the current status. Decision was made to change the PRN Zyprexa dose to 5 mg and administer the Zyprexa 5 mg now.

## 2021-12-22 NOTE — PLAN OF CARE
Problem: Falls - Risk of:  Goal: Will remain free from falls  Description: Will remain free from falls  12/21/2021 2253 by Kimmy Pavon RN  Outcome: Ongoing     Problem: Altered Mood, Depressive Behavior:  Goal: Able to verbalize acceptance of life and situations over which he or she has no control  Description: Able to verbalize acceptance of life and situations over which he or she has no control  12/22/2021 0954 by Lazara Whittington RN  Outcome: Ongoing  12/21/2021 2253 by Kimmy Pavon RN  Outcome: Ongoing   While patient was getting medications from writer she became very anxious, to the point of not being able to speak. Patient ate breakfast, and spoke to writer earlier barely anxious. Patient anxiety started when she was told valium is now ordered, and replaced the ativan. Patient refused valium and after becoming very anxious, requested the valium and took it. Patient denies any other problems with her mood. Patient stated she had some acid reflux last night, and has taken gaviscon as needed at home.

## 2021-12-22 NOTE — PROGRESS NOTES
Department of Psychiatry  AttendingProgress Note  Chief Complaint: \"I still feel anxious\"  Patient's chart was reviewed and collaborated with  about the treatment plan. SUBJECTIVE:   Pt was sitting up, eating lunch this afternoon. Pt states that she had been hesitant to take the Diazepam, \"but I took it\". Pt states she thought about it and decided it was what she needed to do. Pt reports still feeling anxious today. Pt is worried that her  has not been able to come and see her since there was a quarantine on the unit. Pt asked if she wanted to call her , and she states that she will in a little bit. Pt isolating herself in her room, despite efforts of staff to get her to attend groups out in the main area. Pt declines. Pt denies any feeling of wanting to hurt herself, but that she is just \"anxious\". Pt unable to describe what the source of the anxiety is. Pt begins to stutter and unable to answer questions when pressed too much. Plan to trial the Diazepam for today and see if the longer acting begins to alleviate anxiety symptoms. Message left with Dr. Heather Ledezma to touch base on pt's care and condition. Patient is feeling unchanged. Suicidal ideation:  denies suicidal ideation. Patient does not have medication side effects. ROS: Patient has new complaints: no  Sleeping adequately:  Yes   Appetite adequate: Yes  Attending groups: No: staff encouraging  Visitors:Yes,  to visit when able and call    OBJECTIVE    Physical  VITALS:  /78   Pulse 66   Temp 96.6 °F (35.9 °C) (Temporal)   Resp 16   Ht 5' (1.524 m)   Wt 180 lb 6.4 oz (81.8 kg)   LMP  (LMP Unknown)   SpO2 94%   BMI 35.23 kg/m²     Mental Status Examination:  Patients appearance was well-appearing, street clothes and seated in bed. Thoughts are Unusual fears, anxious. Homicidal ideations none. No abnormal movements, tics or mannerisms.   Memory impaired states \"I dont' know\" to many questions Aims 0. Concentration Poor. Alert and oriented X 4. Insight and Judgement repressed. Patient was cooperative. Patient gait normal. Mood anxious, affect anxiety Hallucinations Absent, suicidal ideations no specific plan to harm self Speech soft  Data  Labs:   Admission on 12/16/2021   Component Date Value Ref Range Status    POC Glucose 12/17/2021 168* 70 - 99 mg/dl Final    Performed on 12/17/2021 ACCU-CHEK   Final    POC Glucose 12/17/2021 141* 70 - 99 mg/dl Final    Performed on 12/17/2021 ACCU-CHEK   Final    Potassium 12/18/2021 2.9* 3.5 - 5.1 mmol/L Final    Magnesium 12/18/2021 1.90  1.80 - 2.40 mg/dL Final    Potassium 12/18/2021 4.0  3.5 - 5.1 mmol/L Final    POC Glucose 12/18/2021 122* 70 - 99 mg/dl Final    Performed on 12/18/2021 ACCU-CHEK   Final    POC Glucose 12/18/2021 116* 70 - 99 mg/dl Final    Performed on 12/18/2021 ACCU-CHEK   Final    Sodium 12/19/2021 141  136 - 145 mmol/L Final    Potassium reflex Magnesium 12/19/2021 3.4* 3.5 - 5.1 mmol/L Final    Chloride 12/19/2021 104  99 - 110 mmol/L Final    CO2 12/19/2021 26  21 - 32 mmol/L Final    Anion Gap 12/19/2021 11  3 - 16 Final    Glucose 12/19/2021 107* 70 - 99 mg/dL Final    BUN 12/19/2021 21* 7 - 20 mg/dL Final    CREATININE 12/19/2021 1.0  0.6 - 1.2 mg/dL Final    GFR Non- 12/19/2021 54* >60 Final    Comment: >60 mL/min/1.73m2 EGFR, calc. for ages 25 and older using the  MDRD formula (not corrected for weight), is valid for stable  renal function.  GFR  12/19/2021 >60  >60 Final    Comment: Chronic Kidney Disease: less than 60 ml/min/1.73 sq.m. Kidney Failure: less than 15 ml/min/1.73 sq.m. Results valid for patients 18 years and older.       Calcium 12/19/2021 10.0  8.3 - 10.6 mg/dL Final    POC Glucose 12/19/2021 103* 70 - 99 mg/dl Final    Performed on 12/19/2021 ACCU-CHEK   Final    Magnesium 12/19/2021 1.90  1.80 - 2.40 mg/dL Final    POC Glucose 12/19/2021 130* 70 - 99 mg/dl Final    Performed on 12/19/2021 ACCU-CHEK   Final    POC Glucose 12/20/2021 125* 70 - 99 mg/dl Final    Performed on 12/20/2021 ACCU-CHEK   Final    POC Glucose 12/20/2021 115* 70 - 99 mg/dl Final    Performed on 12/20/2021 ACCU-CHEK   Final    POC Glucose 12/20/2021 129* 70 - 99 mg/dl Final    Performed on 12/20/2021 ACCU-CHEK   Final    Sodium 12/21/2021 142  136 - 145 mmol/L Final    Potassium reflex Magnesium 12/21/2021 3.2* 3.5 - 5.1 mmol/L Final    Chloride 12/21/2021 102  99 - 110 mmol/L Final    CO2 12/21/2021 30  21 - 32 mmol/L Final    Anion Gap 12/21/2021 10  3 - 16 Final    Glucose 12/21/2021 108* 70 - 99 mg/dL Final    BUN 12/21/2021 16  7 - 20 mg/dL Final    CREATININE 12/21/2021 0.9  0.6 - 1.2 mg/dL Final    GFR Non- 12/21/2021 >60  >60 Final    Comment: >60 mL/min/1.73m2 EGFR, calc. for ages 25 and older using the  MDRD formula (not corrected for weight), is valid for stable  renal function.  GFR  12/21/2021 >60  >60 Final    Comment: Chronic Kidney Disease: less than 60 ml/min/1.73 sq.m. Kidney Failure: less than 15 ml/min/1.73 sq.m. Results valid for patients 18 years and older.       Calcium 12/21/2021 10.6  8.3 - 10.6 mg/dL Final    POC Glucose 12/21/2021 99  70 - 99 mg/dl Final    Performed on 12/21/2021 ACCU-CHEK   Final    Magnesium 12/21/2021 1.90  1.80 - 2.40 mg/dL Final    POC Glucose 12/21/2021 126* 70 - 99 mg/dl Final    Performed on 12/21/2021 ACCU-CHEK   Final    POC Glucose 12/22/2021 108* 70 - 99 mg/dl Final    Performed on 12/22/2021 ACCU-CHEK   Final            Medications  Current Facility-Administered Medications: potassium chloride (KLOR-CON M) extended release tablet 20 mEq, 20 mEq, Oral, Daily with breakfast  diazePAM (VALIUM) tablet 2 mg, 2 mg, Oral, BID  acetaminophen (TYLENOL) tablet 650 mg, 650 mg, Oral, Q4H PRN  ibuprofen (ADVIL;MOTRIN) tablet 400 mg, 400 mg, Oral, Q6H PRN  magnesium hydroxide (MILK OF MAGNESIA) 400 MG/5ML suspension 30 mL, 30 mL, Oral, Daily PRN  aluminum & magnesium hydroxide-simethicone (MAALOX) 200-200-20 MG/5ML suspension 30 mL, 30 mL, Oral, Q6H PRN  sterile water injection 2.1 mL, 2.1 mL, IntraMUSCular, Q4H PRN  benztropine mesylate (COGENTIN) injection 2 mg, 2 mg, IntraMUSCular, BID PRN  nicotine polacrilex (COMMIT) lozenge 2 mg, 2 mg, Oral, Q1H PRN  traZODone (DESYREL) tablet 50 mg, 50 mg, Oral, Nightly PRN  hydrOXYzine (VISTARIL) capsule 50 mg, 50 mg, Oral, Q6H PRN  OLANZapine (ZYPREXA) tablet 10 mg, 10 mg, Oral, Q8H PRN **OR** OLANZapine (ZYPREXA) injection 10 mg, 10 mg, IntraMUSCular, Q8H PRN  aspirin chewable tablet 81 mg, 81 mg, Oral, Daily  sertraline (ZOLOFT) tablet 150 mg, 150 mg, Oral, Nightly  dorzolamide-timolol (COSOPT) 22.3-6.8 MG/ML ophthalmic solution 1 drop, 1 drop, Ophthalmic, BID  atorvastatin (LIPITOR) tablet 20 mg, 20 mg, Oral, Nightly  metFORMIN (GLUCOPHAGE) tablet 500 mg, 500 mg, Oral, BID WC  metoprolol succinate (TOPROL XL) extended release tablet 25 mg, 25 mg, Oral, Daily  metoprolol succinate (TOPROL XL) extended release tablet 100 mg, 100 mg, Oral, Daily  QUEtiapine (SEROQUEL) tablet 50 mg, 50 mg, Oral, Nightly  QUEtiapine (SEROQUEL) tablet 25 mg, 25 mg, Oral, Daily  amLODIPine (NORVASC) tablet 5 mg, 5 mg, Oral, Daily  latanoprost (XALATAN) 0.005 % ophthalmic solution 1 drop, 1 drop, Both Eyes, Nightly  losartan (COZAAR) tablet 100 mg, 100 mg, Oral, Daily **AND** hydroCHLOROthiazide (HYDRODIURIL) tablet 25 mg, 25 mg, Oral, Daily    ASSESSMENT AND PLAN    Principal Problem:    Generalized anxiety disorder  Active Problems:    Type 2 diabetes mellitus without complication, without long-term current use of insulin (HCC)    Major depression, single episode    Hypokalemia    Hypomagnesemia    Asymptomatic bacteriuria  Resolved Problems:    * No resolved hospital problems. *       1. Patient s symptoms show no change.   New medication started this morning  2. Probable discharge is unknown  3. Discharge planning is incomplete  4. Suicidal ideation is unchanged, remains none  5. Total time with patient was 40 minutes and more than 50 % of that time was spent counseling the patient on their symptoms, treatment and expected goals.      [facility]

## 2021-12-22 NOTE — FLOWSHEET NOTE
Senior Purposeful Rounding     Position:Repositions Self     Physical Environment:Room free from clutter, Clear path to bathroom , Adequate lighting and No safety hazards noted     Pain Rating/ Nonverbal Pain Behaviors: none observed; 0     Pain interventions Attempted: n/a     Patient Toileted:Independent

## 2021-12-22 NOTE — PLAN OF CARE
Problem: Falls - Risk of:  Goal: Will remain free from falls  Description: Will remain free from falls  Outcome: Ongoing     Problem: Altered Mood, Depressive Behavior:  Goal: Able to verbalize acceptance of life and situations over which he or she has no control  Description: Able to verbalize acceptance of life and situations over which he or she has no control  12/21/2021 2253 by Sara Cline RN  Outcome: Ongoing    Pt has been isolative to her room this shift. She was feeling very anxious and often unable to verbalize due to stuttering. Pt educated on valium but she continues to refuse. Pt said that she took it in the past and did not like how it made her feel. Pt was asked to elaborate but she was unable. She was compliant with her other medications. She denies SI/HI/AVH and no RTIS noted.  Denies needs currently

## 2021-12-22 NOTE — PROGRESS NOTES
Inpatient Occupational Therapy Treatment Note    Unit:  [facility]  Date:  12/22/2021  Patient Name:    Kiesha Rangel  Admitting diagnosis:  Major depression, single episode [F32.9]  Admit Date:  12/16/2021  Precautions/Restrictions/WB Status/ Lines/ Wounds/ Oxygen:  Standard BHI Precautions  Fall Risk  History of Present Illness:  Caitlin Gutierrez, 12/17/2021:   \" Patient seen in room on Adult Behavioral Unit.   Patient is a 67 y. o. female who presents to the [facility] unit from [facility] ED.  Pt came to ED by squad following increasing anxiety that has caused her to be unable to care for herself safely at home and affected sleep and eating as well.  Pt has also not been taking her medications as they were prescribed.  Pt is a poor historian, becoming increasingly anxious while trying to have a conversation.  Pt begins to stutter and become upset when trying to answer questions, stating \"I'm just so confused\".  Pt's  states that he does help with her medications, but did not know how often she should have been taking each medication.  He describes having to help her more with ADL's and she grows increasingly anxious, unable to eat or care for herself. Mehul Pressley do have home health visit twice a week for PT and bathing.  Pts  does not feel that she is safe at home in her current condition and wants to get her medications straight and help her build her strength back up.  Pt states that she can walk short distances in the home but does not walk much outside of the home.  Pt denies suicidal thoughts.  \"     Treatment Number:  2    Treatment Time: 4356-6258  Timed Code Treatment Minutes:  31 minutes   Total Treatment Time:    31  minutes    Staff Recommendations:    Assist of 1  Discharge Recommendations:  Home with PRN assist and Home OT and HHPT    DME needs for discharge:  Needs Met    AM-PAC Score: AM-PAC Inpatient Daily Activity Raw Score: 18   Home Health S4 Level: NA      MOCA:  To be assessed    Subjective: Pt was found in bed today. Was agreeable to OT tx. ADLs:  Sleep Hygiene:  Issued sleep hygiene handout to pt and reviewed. Pt found several techniques helpful, including getting into a routine, staying out of bed unless going to sleep,  and avoiding napping. Coping Skills: Discussed importance of coping skills, to distract and improve outlook. Pt was able to list reading, being with her grandchildren, going to the movies, going out to lunch with friends  Self Care: Toileting: NT  Grooming: NT  Dressing: NT    Pain   No  Rating:NA  Location: NA  Pain Medicine Status: No request made      Cognition    A&O to Person, Place, Time and Situation  Able to follow:  1 step commands    Balance:     Not tested    Bed mobility:      Transfer Training:   Sit to stand:   Not Tested  Stand to sit:  Not Tested  Bed to Chair:  Not Tested  Standard toilet:   Not Tested    Activity Tolerance   Pt completed therapy session with No adverse symptoms noted w/activity    Therapeutic Exercise:   NA    Patient Education:   Role of OT, Sleep hygiene and Coping skills    Positioning Needs: In room with needs met. Encouraged pt to enter common room for lunch. Family Present:  No    Assessment: Pt was agreeable to treatment today and was somewhat receptive to OT recommendations. Pt should continue to benefit from skilled OT tx to maximize independence so that she may eventually return home with the least amount of assistance. GOALS  To be met in 3 Visits:  1. Pt. To verbalize 3 coping skills. MET  2. Pt to complete MOCA.     To be met in 5 Visits:  1. Pt. To complete interest checklist.    2. Pt. To verbalize understanding of sleep hygiene education. MET  3. Pt. To verbalize understanding of 3 communication skills. 4. Pt. To complete daily schedule of healthy activities/routines with minimal assist.  5. Full Body bathing: Independent  6. Full Body dressing: Independent  7.  HEP: To complete UE exercises independently, per handout. 8. Pt to actively participate in OT groups at least 1x/week.      Plan: cont with [facility] MS, OTR/L  #[id number]        If patient discharges from this facility prior to next visit, this note will serve as the Discharge Summary

## 2021-12-22 NOTE — BH NOTE
[facility] is anxious and upset due to the realization that she will have a male nurse. Carla Norris RN is currently talking with her and the charge nurse is also meeting with [facility].

## 2021-12-22 NOTE — BH NOTE
Spoke with pt's  Stephanie Laura and provided update on pt's status/treatment and visiting hours over the holiday weekend.

## 2021-12-22 NOTE — BH NOTE
Debora Leobardo cooperated with taking the Zyprexa 5 mg and her scheduled Metformin.  Enid Laura was present at bedside when this nurse administered the medications. Deboraandrew Booth was anxious and agitated but cooperated. Enid Laura continues to be at bedside talking with Debora Booth.

## 2021-12-22 NOTE — BH NOTE
, Horacio Vail is currently meeting with [facility]. [facility] agreed to allow this nurse to check her blood sugar with Horacio Vail present.

## 2021-12-23 LAB
ANION GAP SERPL CALCULATED.3IONS-SCNC: 11 MMOL/L (ref 3–16)
BACTERIA: ABNORMAL /HPF
BILIRUBIN URINE: NEGATIVE
BLOOD, URINE: NEGATIVE
BUN BLDV-MCNC: 20 MG/DL (ref 7–20)
CALCIUM SERPL-MCNC: 10.4 MG/DL (ref 8.3–10.6)
CHLORIDE BLD-SCNC: 104 MMOL/L (ref 99–110)
CLARITY: CLEAR
CO2: 27 MMOL/L (ref 21–32)
COLOR: YELLOW
CREAT SERPL-MCNC: 0.8 MG/DL (ref 0.6–1.2)
EPITHELIAL CELLS, UA: ABNORMAL /HPF (ref 0–5)
GFR AFRICAN AMERICAN: >60
GFR NON-AFRICAN AMERICAN: >60
GLUCOSE BLD-MCNC: 109 MG/DL (ref 70–99)
GLUCOSE BLD-MCNC: 128 MG/DL (ref 70–99)
GLUCOSE URINE: NEGATIVE MG/DL
HYALINE CASTS: ABNORMAL /LPF (ref 0–2)
KETONES, URINE: NEGATIVE MG/DL
LEUKOCYTE ESTERASE, URINE: ABNORMAL
MAGNESIUM: 1.6 MG/DL (ref 1.8–2.4)
MICROSCOPIC EXAMINATION: YES
MUCUS: ABNORMAL /LPF
NITRITE, URINE: NEGATIVE
PERFORMED ON: ABNORMAL
PH UA: 6.5 (ref 5–8)
POTASSIUM REFLEX MAGNESIUM: 3.5 MMOL/L (ref 3.5–5.1)
PROTEIN UA: NEGATIVE MG/DL
RBC UA: ABNORMAL /HPF (ref 0–4)
SODIUM BLD-SCNC: 142 MMOL/L (ref 136–145)
SPECIFIC GRAVITY UA: 1.01 (ref 1–1.03)
URINE REFLEX TO CULTURE: YES
URINE TYPE: ABNORMAL
UROBILINOGEN, URINE: 0.2 E.U./DL
WBC UA: ABNORMAL /HPF (ref 0–5)

## 2021-12-23 PROCEDURE — 80048 BASIC METABOLIC PNL TOTAL CA: CPT

## 2021-12-23 PROCEDURE — 1240000000 HC EMOTIONAL WELLNESS R&B

## 2021-12-23 PROCEDURE — 36415 COLL VENOUS BLD VENIPUNCTURE: CPT

## 2021-12-23 PROCEDURE — 6370000000 HC RX 637 (ALT 250 FOR IP)

## 2021-12-23 PROCEDURE — 6370000000 HC RX 637 (ALT 250 FOR IP): Performed by: PSYCHIATRY & NEUROLOGY

## 2021-12-23 PROCEDURE — 99233 SBSQ HOSP IP/OBS HIGH 50: CPT

## 2021-12-23 PROCEDURE — 83735 ASSAY OF MAGNESIUM: CPT

## 2021-12-23 PROCEDURE — 87086 URINE CULTURE/COLONY COUNT: CPT

## 2021-12-23 PROCEDURE — 81001 URINALYSIS AUTO W/SCOPE: CPT

## 2021-12-23 RX ORDER — OLANZAPINE 5 MG/1
2.5 TABLET ORAL NIGHTLY
Status: DISCONTINUED | OUTPATIENT
Start: 2021-12-23 | End: 2021-12-30 | Stop reason: HOSPADM

## 2021-12-23 RX ORDER — OLANZAPINE 10 MG/1
2.5 INJECTION, POWDER, LYOPHILIZED, FOR SOLUTION INTRAMUSCULAR EVERY 8 HOURS PRN
Status: DISCONTINUED | OUTPATIENT
Start: 2021-12-23 | End: 2021-12-30 | Stop reason: HOSPADM

## 2021-12-23 RX ORDER — CEPHALEXIN 500 MG/1
500 CAPSULE ORAL 2 TIMES DAILY
Status: COMPLETED | OUTPATIENT
Start: 2021-12-23 | End: 2021-12-27

## 2021-12-23 RX ORDER — MIRTAZAPINE 15 MG/1
7.5 TABLET, FILM COATED ORAL NIGHTLY
Status: DISCONTINUED | OUTPATIENT
Start: 2021-12-23 | End: 2021-12-30 | Stop reason: HOSPADM

## 2021-12-23 RX ORDER — LORAZEPAM 0.5 MG/1
0.5 TABLET ORAL ONCE
Status: COMPLETED | OUTPATIENT
Start: 2021-12-23 | End: 2021-12-23

## 2021-12-23 RX ORDER — OLANZAPINE 5 MG/1
2.5 TABLET ORAL EVERY 8 HOURS PRN
Status: DISCONTINUED | OUTPATIENT
Start: 2021-12-23 | End: 2021-12-30 | Stop reason: HOSPADM

## 2021-12-23 RX ORDER — ASCORBIC ACID 500 MG
500 TABLET ORAL DAILY
Status: DISCONTINUED | OUTPATIENT
Start: 2021-12-23 | End: 2021-12-30 | Stop reason: HOSPADM

## 2021-12-23 RX ADMIN — TRAZODONE HYDROCHLORIDE 50 MG: 50 TABLET ORAL at 20:51

## 2021-12-23 RX ADMIN — METFORMIN HYDROCHLORIDE 500 MG: 500 TABLET ORAL at 09:29

## 2021-12-23 RX ADMIN — DIAZEPAM 2 MG: 2 TABLET ORAL at 20:51

## 2021-12-23 RX ADMIN — CEPHALEXIN 500 MG: 500 CAPSULE ORAL at 13:53

## 2021-12-23 RX ADMIN — OLANZAPINE 2.5 MG: 5 TABLET, FILM COATED ORAL at 20:51

## 2021-12-23 RX ADMIN — CEPHALEXIN 500 MG: 500 CAPSULE ORAL at 20:51

## 2021-12-23 RX ADMIN — SERTRALINE 150 MG: 100 TABLET, FILM COATED ORAL at 20:51

## 2021-12-23 RX ADMIN — OXYCODONE HYDROCHLORIDE AND ACETAMINOPHEN 500 MG: 500 TABLET ORAL at 13:53

## 2021-12-23 RX ADMIN — OLANZAPINE 5 MG: 5 TABLET, FILM COATED ORAL at 09:40

## 2021-12-23 RX ADMIN — AMLODIPINE BESYLATE 5 MG: 5 TABLET ORAL at 09:29

## 2021-12-23 RX ADMIN — DORZOLAMIDE HYDROCHLORIDE AND TIMOLOL MALEATE 1 DROP: 20; 5 SOLUTION/ DROPS OPHTHALMIC at 09:30

## 2021-12-23 RX ADMIN — METOPROLOL SUCCINATE 100 MG: 50 TABLET, EXTENDED RELEASE ORAL at 08:53

## 2021-12-23 RX ADMIN — HYDROCHLOROTHIAZIDE 25 MG: 25 TABLET ORAL at 09:29

## 2021-12-23 RX ADMIN — ASPIRIN 81 MG: 81 TABLET, CHEWABLE ORAL at 09:29

## 2021-12-23 RX ADMIN — DIAZEPAM 2 MG: 2 TABLET ORAL at 09:29

## 2021-12-23 RX ADMIN — LATANOPROST 1 DROP: 50 SOLUTION OPHTHALMIC at 20:51

## 2021-12-23 RX ADMIN — METOPROLOL SUCCINATE 25 MG: 25 TABLET, EXTENDED RELEASE ORAL at 08:52

## 2021-12-23 RX ADMIN — ATORVASTATIN CALCIUM 20 MG: 10 TABLET, FILM COATED ORAL at 20:51

## 2021-12-23 RX ADMIN — MIRTAZAPINE 7.5 MG: 15 TABLET, FILM COATED ORAL at 20:51

## 2021-12-23 RX ADMIN — LOSARTAN POTASSIUM 100 MG: 100 TABLET, FILM COATED ORAL at 09:29

## 2021-12-23 RX ADMIN — POTASSIUM CHLORIDE 20 MEQ: 1500 TABLET, EXTENDED RELEASE ORAL at 09:29

## 2021-12-23 RX ADMIN — DORZOLAMIDE HYDROCHLORIDE AND TIMOLOL MALEATE 1 DROP: 20; 5 SOLUTION/ DROPS OPHTHALMIC at 20:51

## 2021-12-23 RX ADMIN — LORAZEPAM 0.5 MG: 0.5 TABLET ORAL at 13:09

## 2021-12-23 ASSESSMENT — PAIN SCALES - GENERAL
PAINLEVEL_OUTOF10: 0

## 2021-12-23 NOTE — PLAN OF CARE
Patient originally with asymptomatic bacteruria on admission. Bacteria, WBCs and Leuk esterase has increase. Patient is now complaining of suprapubic discomfort, +/- dysuria, and increase urinary frequency. UA comparison below    Keflex 500 mg BID x 5 days ordered    Follow urine culture    Results for Mai Prakash (MRN [id number]) as of 12/23/2021 13:31   Ref.  Range 12/16/2021 12:51 12/23/2021 09:40   Color, UA Latest Ref Range: Straw/Yellow  Yellow Yellow   Clarity, UA Latest Ref Range: Clear  Clear Clear   Glucose, UA Latest Ref Range: Negative mg/dL Negative Negative   Bilirubin, Urine Latest Ref Range: Negative  SMALL (A) Negative   Ketones, Urine Latest Ref Range: Negative mg/dL TRACE (A) Negative   Specific Gravity, UA Latest Ref Range: 1.005 - 1.030  1.020 1.010   Blood, Urine Latest Ref Range: Negative  Negative Negative   pH, UA Latest Ref Range: 5.0 - 8.0  6.0 6.5   Protein, UA Latest Ref Range: Negative mg/dL Negative Negative   Urobilinogen, Urine Latest Ref Range: <2.0 E.U./dL 0.2 0.2   Nitrite, Urine Latest Ref Range: Negative  Negative Negative   Leukocyte Esterase, Urine Latest Ref Range: Negative  SMALL (A) LARGE (A)   Urine Type Unknown NotGiven NotGiven   Urine Reflex to Culture Unknown  Yes   Hyaline Casts, UA Latest Ref Range: 0 - 2 /LPF 3-5 (A) 3-5 (A)   Mucus, UA Latest Ref Range: None Seen /LPF  3+ (A)   WBC, UA Latest Ref Range: 0 - 5 /HPF 6-9 (A)  (A)   RBC, UA Latest Ref Range: 0 - 4 /HPF None seen 3-4   Epithelial Cells, UA Latest Ref Range: 0 - 5 /HPF 2-5 6-10 (A)   Renal Epithelial, UA Latest Ref Range: 0 - 1 /HPF 2-5 (A)    Bacteria, UA Latest Ref Range: None Seen /HPF 1+ (A) 2+ (A)   Microscopic Examination Unknown YES YES     Mathieu Qureshi PA-C  12/23/2021 1:35 PM

## 2021-12-23 NOTE — FLOWSHEET NOTE
Senior Purposeful Rounding    Position:Repositions Self    Physical Environment:Room free from clutter, Clear path to bathroom , Adequate lighting and No safety hazards noted    Pain Rating/ Nonverbal Pain Behaviors:0; none observed    Pain interventions Attempted: n/a    Patient Toileted:Independent

## 2021-12-23 NOTE — PLAN OF CARE
[facility] Interdisciplinary Treatment Plan Note     Review Date & Time: 12/23/21 1005    Patient was not in treatment team.    Estimated Length of Stay Update:  3-5 days   Estimated Discharge Date Update: 12/26/21-12/28/21    EDUCATION:   Learner Progress Toward Treatment Goals: Reviewed results and recommendations of this team    Method: Group    Outcome: Verbalized understanding    PATIENT GOALS: none expressed    PLAN/TREATMENT RECOMMENDATIONS UPDATE: continue treatment    GOALS UPDATE:  Time frame for Short-Term Goals: 2 days      Enrique Chao RN

## 2021-12-23 NOTE — PLAN OF CARE
Problem: Falls - Risk of:  Goal: Will remain free from falls  Description: Will remain free from falls  Outcome: Ongoing     Problem: Altered Mood, Depressive Behavior:  Goal: Able to verbalize and/or display a decrease in depressive symptoms  Description: Able to verbalize and/or display a decrease in depressive symptoms  Outcome: Ongoing    Pt was initially very anxious when first entering her room. She had difficulty with speech and was also stuttering. She was afraid to use the bathroom by herself and asked writer if she could stay in the room while she used the bathroom. Pt later given scheduled medications which were effective in controlling her anxiety. She denies SI/HI/AVH and no RTIS noted. Pt did say that she felt paranoid of another male pt on the unit. Pt instructed that she was safe on the unit and she verbalized agreement. She refused snacks. She is currently resting in bed with eyes closed.  Denies needs

## 2021-12-23 NOTE — PROGRESS NOTES
Behavioral Services                                              Medicare Re-Certification    I certify that the inpatient psychiatric hospital services furnished since the previous certification/re-certification were, and continue to be, medically necessary for;    [x] (1) Treatment which could reasonably be expected to improve the patient's condition,    [x] (2) Or for diagnostic study. Estimated length of stay/service 3-5 days      Plan for post-hospital care medication management and therapy    This patient continues to need, on a daily basis, active treatment furnished directly by or requiring the supervision of inpatient psychiatric personnel.     Electronically signed by SHEY Walton CNP on 12/23/2021 at 12:47 PM

## 2021-12-23 NOTE — FLOWSHEET NOTE
Senior Purposeful Rounding     Position:Repositions Self     Physical Environment:Room free from clutter, Clear path to bathroom , Adequate lighting and No safety hazards noted     Pain Rating/ Nonverbal Pain Behaviors:denies; 0     Pain interventions Attempted: n/a     Patient Toileted:Continent

## 2021-12-23 NOTE — PROGRESS NOTES
Department of Psychiatry  AttendingProgress Note  Chief Complaint: \"I still feel very nervous\"    Patient's chart was reviewed and collaborated with  about the treatment plan. SUBJECTIVE:    Pt continues with anxiety, self-isolating, and stuttering when she becomes too upset. Pt did come out to common area this morning and sit with another woman. Pt states that is was very hard for her to do, but she is glad she did it. Pt states that she becomes nervous and anxious when her home health care workers come to her home as well. Pt's speech clears when she talks about herself, , and home life. When asked about coming out of her room or talking with other patients, pt becomes upset, stuttering resumes, and she has trouble finding her words. Pt focused on the men that are on the unit and fearful that they may enter her room. Pt assured that staff is watching for her safety. Pt states she slept \"ok\" last night. Pt also states that she is not eating as she would at home because the food is \"different\" than how she would make it at home. Writer able to speak with Dr. Js Santacruz last night who states these behaviors have gone on for 7-8 weeks now. He suggested a low dose atypical antipsychotic, which was added per recommendation. UA sent to check for underlying infection that may be affecting behaviors. Mirtazapine also added to address anxiety. Patient is feeling unchanged. Suicidal ideation:  denies suicidal ideation. Patient does not have medication side effects.     ROS: Patient has new complaints: no  Sleeping adequately:  Yes   Appetite adequate: No: States the food is \"different\" here  Attending groups: No: Staff continues to encourage   Visitors:Yes    OBJECTIVE    Physical  VITALS:  /86   Pulse 76   Temp 97.1 °F (36.2 °C) (Temporal)   Resp 20   Ht 5' (1.524 m)   Wt 180 lb 6.4 oz (81.8 kg)   LMP  (LMP Unknown)   SpO2 95%   BMI 35.23 kg/m²     Mental Status Examination: Patients appearance was street clothes and lying in bed. Thoughts are Unusual fears. Homicidal ideations none. No abnormal movements, tics or mannerisms. Memory intact Aims 0. Concentration Fair. Alert and oriented X 4. Insight and Judgement impaired insight. Patient was anxious. Patient gait shuffling (per staff). Mood anxious, affect anxiety Hallucinations Absent, suicidal ideations no specific plan to harm self Speech increased latency of response and soft  Data  Labs:   Admission on 12/16/2021   Component Date Value Ref Range Status    POC Glucose 12/17/2021 168* 70 - 99 mg/dl Final    Performed on 12/17/2021 ACCU-CHEK   Final    POC Glucose 12/17/2021 141* 70 - 99 mg/dl Final    Performed on 12/17/2021 ACCU-CHEK   Final    Potassium 12/18/2021 2.9* 3.5 - 5.1 mmol/L Final    Magnesium 12/18/2021 1.90  1.80 - 2.40 mg/dL Final    Potassium 12/18/2021 4.0  3.5 - 5.1 mmol/L Final    POC Glucose 12/18/2021 122* 70 - 99 mg/dl Final    Performed on 12/18/2021 ACCU-CHEK   Final    POC Glucose 12/18/2021 116* 70 - 99 mg/dl Final    Performed on 12/18/2021 ACCU-CHEK   Final    Sodium 12/19/2021 141  136 - 145 mmol/L Final    Potassium reflex Magnesium 12/19/2021 3.4* 3.5 - 5.1 mmol/L Final    Chloride 12/19/2021 104  99 - 110 mmol/L Final    CO2 12/19/2021 26  21 - 32 mmol/L Final    Anion Gap 12/19/2021 11  3 - 16 Final    Glucose 12/19/2021 107* 70 - 99 mg/dL Final    BUN 12/19/2021 21* 7 - 20 mg/dL Final    CREATININE 12/19/2021 1.0  0.6 - 1.2 mg/dL Final    GFR Non- 12/19/2021 54* >60 Final    Comment: >60 mL/min/1.73m2 EGFR, calc. for ages 25 and older using the  MDRD formula (not corrected for weight), is valid for stable  renal function.  GFR  12/19/2021 >60  >60 Final    Comment: Chronic Kidney Disease: less than 60 ml/min/1.73 sq.m. Kidney Failure: less than 15 ml/min/1.73 sq.m. Results valid for patients 18 years and older.       Calcium 12/19/2021 10.0  8.3 - 10.6 mg/dL Final    POC Glucose 12/19/2021 103* 70 - 99 mg/dl Final    Performed on 12/19/2021 ACCU-CHEK   Final    Magnesium 12/19/2021 1.90  1.80 - 2.40 mg/dL Final    POC Glucose 12/19/2021 130* 70 - 99 mg/dl Final    Performed on 12/19/2021 ACCU-CHEK   Final    POC Glucose 12/20/2021 125* 70 - 99 mg/dl Final    Performed on 12/20/2021 ACCU-CHEK   Final    POC Glucose 12/20/2021 115* 70 - 99 mg/dl Final    Performed on 12/20/2021 ACCU-CHEK   Final    POC Glucose 12/20/2021 129* 70 - 99 mg/dl Final    Performed on 12/20/2021 ACCU-CHEK   Final    Sodium 12/21/2021 142  136 - 145 mmol/L Final    Potassium reflex Magnesium 12/21/2021 3.2* 3.5 - 5.1 mmol/L Final    Chloride 12/21/2021 102  99 - 110 mmol/L Final    CO2 12/21/2021 30  21 - 32 mmol/L Final    Anion Gap 12/21/2021 10  3 - 16 Final    Glucose 12/21/2021 108* 70 - 99 mg/dL Final    BUN 12/21/2021 16  7 - 20 mg/dL Final    CREATININE 12/21/2021 0.9  0.6 - 1.2 mg/dL Final    GFR Non- 12/21/2021 >60  >60 Final    Comment: >60 mL/min/1.73m2 EGFR, calc. for ages 25 and older using the  MDRD formula (not corrected for weight), is valid for stable  renal function.  GFR  12/21/2021 >60  >60 Final    Comment: Chronic Kidney Disease: less than 60 ml/min/1.73 sq.m. Kidney Failure: less than 15 ml/min/1.73 sq.m. Results valid for patients 18 years and older.       Calcium 12/21/2021 10.6  8.3 - 10.6 mg/dL Final    POC Glucose 12/21/2021 99  70 - 99 mg/dl Final    Performed on 12/21/2021 ACCU-CHEK   Final    Magnesium 12/21/2021 1.90  1.80 - 2.40 mg/dL Final    POC Glucose 12/21/2021 126* 70 - 99 mg/dl Final    Performed on 12/21/2021 ACCU-CHEK   Final    POC Glucose 12/22/2021 108* 70 - 99 mg/dl Final    Performed on 12/22/2021 ACCU-CHEK   Final    POC Glucose 12/22/2021 107* 70 - 99 mg/dl Final    Performed on 12/22/2021 ACCU-CHEK   Final    Sodium 12/23/2021 142  136 - 145 mmol/L Final    Potassium reflex Magnesium 12/23/2021 3.5  3.5 - 5.1 mmol/L Final    Chloride 12/23/2021 104  99 - 110 mmol/L Final    CO2 12/23/2021 27  21 - 32 mmol/L Final    Anion Gap 12/23/2021 11  3 - 16 Final    Glucose 12/23/2021 128* 70 - 99 mg/dL Final    BUN 12/23/2021 20  7 - 20 mg/dL Final    CREATININE 12/23/2021 0.8  0.6 - 1.2 mg/dL Final    GFR Non- 12/23/2021 >60  >60 Final    Comment: >60 mL/min/1.73m2 EGFR, calc. for ages 25 and older using the  MDRD formula (not corrected for weight), is valid for stable  renal function.  GFR  12/23/2021 >60  >60 Final    Comment: Chronic Kidney Disease: less than 60 ml/min/1.73 sq.m. Kidney Failure: less than 15 ml/min/1.73 sq.m. Results valid for patients 18 years and older.  Calcium 12/23/2021 10.4  8.3 - 10.6 mg/dL Final    POC Glucose 12/23/2021 109* 70 - 99 mg/dl Final    Performed on 12/23/2021 ACCU-CHEK   Final    Color, UA 12/23/2021 Yellow  Straw/Yellow Final    Clarity, UA 12/23/2021 Clear  Clear Final    Glucose, Ur 12/23/2021 Negative  Negative mg/dL Final    Bilirubin Urine 12/23/2021 Negative  Negative Final    Ketones, Urine 12/23/2021 Negative  Negative mg/dL Final    Specific Gravity, UA 12/23/2021 1.010  1.005 - 1.030 Final    Blood, Urine 12/23/2021 Negative  Negative Final    pH, UA 12/23/2021 6.5  5.0 - 8.0 Final    Protein, UA 12/23/2021 Negative  Negative mg/dL Final    Urobilinogen, Urine 12/23/2021 0.2  <2.0 E.U./dL Final    Nitrite, Urine 12/23/2021 Negative  Negative Final    Leukocyte Esterase, Urine 12/23/2021 LARGE* Negative Final    Microscopic Examination 12/23/2021 YES   Final    Urine Type 12/23/2021 NotGiven   Final    Urine received in a container without preservatives.     Urine Reflex to Culture 12/23/2021 Yes   Final    Magnesium 12/23/2021 1.60* 1.80 - 2.40 mg/dL Final    Hyaline Casts, UA 12/23/2021 3-5* 0 - 2 /LPF Final    Mucus, UA 12/23/2021 3+* None Seen /LPF Final    WBC, UA 12/23/2021 * 0 - 5 /HPF Final    RBC, UA 12/23/2021 3-4  0 - 4 /HPF Final    Epithelial Cells, UA 12/23/2021 6-10* 0 - 5 /HPF Final    Bacteria, UA 12/23/2021 2+* None Seen /HPF Final            Medications  Current Facility-Administered Medications: OLANZapine (ZYPREXA) tablet 2.5 mg, 2.5 mg, Oral, Nightly  mirtazapine (REMERON) tablet 7.5 mg, 7.5 mg, Oral, Nightly  OLANZapine (ZYPREXA) tablet 2.5 mg, 2.5 mg, Oral, Q8H PRN **OR** OLANZapine (ZYPREXA) injection 2.5 mg, 2.5 mg, IntraMUSCular, Q8H PRN  potassium chloride (KLOR-CON M) extended release tablet 20 mEq, 20 mEq, Oral, Daily with breakfast  diazePAM (VALIUM) tablet 2 mg, 2 mg, Oral, BID  acetaminophen (TYLENOL) tablet 650 mg, 650 mg, Oral, Q4H PRN  ibuprofen (ADVIL;MOTRIN) tablet 400 mg, 400 mg, Oral, Q6H PRN  magnesium hydroxide (MILK OF MAGNESIA) 400 MG/5ML suspension 30 mL, 30 mL, Oral, Daily PRN  aluminum & magnesium hydroxide-simethicone (MAALOX) 200-200-20 MG/5ML suspension 30 mL, 30 mL, Oral, Q6H PRN  sterile water injection 2.1 mL, 2.1 mL, IntraMUSCular, Q4H PRN  benztropine mesylate (COGENTIN) injection 2 mg, 2 mg, IntraMUSCular, BID PRN  nicotine polacrilex (COMMIT) lozenge 2 mg, 2 mg, Oral, Q1H PRN  traZODone (DESYREL) tablet 50 mg, 50 mg, Oral, Nightly PRN  hydrOXYzine (VISTARIL) capsule 50 mg, 50 mg, Oral, Q6H PRN  aspirin chewable tablet 81 mg, 81 mg, Oral, Daily  sertraline (ZOLOFT) tablet 150 mg, 150 mg, Oral, Nightly  dorzolamide-timolol (COSOPT) 22.3-6.8 MG/ML ophthalmic solution 1 drop, 1 drop, Ophthalmic, BID  atorvastatin (LIPITOR) tablet 20 mg, 20 mg, Oral, Nightly  metFORMIN (GLUCOPHAGE) tablet 500 mg, 500 mg, Oral, BID WC  metoprolol succinate (TOPROL XL) extended release tablet 25 mg, 25 mg, Oral, Daily  metoprolol succinate (TOPROL XL) extended release tablet 100 mg, 100 mg, Oral, Daily  amLODIPine (NORVASC) tablet 5 mg, 5 mg, Oral, Daily  latanoprost (XALATAN) 0.005 % ophthalmic solution 1 drop, 1 drop, Both Eyes, Nightly  losartan (COZAAR) tablet 100 mg, 100 mg, Oral, Daily **AND** hydroCHLOROthiazide (HYDRODIURIL) tablet 25 mg, 25 mg, Oral, Daily    ASSESSMENT AND PLAN    Principal Problem:    Generalized anxiety disorder  Active Problems:    Type 2 diabetes mellitus without complication, without long-term current use of insulin (HCC)    Major depression, single episode    Hypokalemia    Hypomagnesemia    Asymptomatic bacteriuria  Resolved Problems:    * No resolved hospital problems. *       1. Patient symptoms remain, small step forward with patient sitting in common area   2. Probable discharge is next week  3. Discharge planning is incomplete  4. Suicidal ideation is unchanged, remain  5. Total time with patient was 40 minutes and more than 50 % of that time was spent counseling the patient on their symptoms, treatment and expected goals.      Enrique Greene - [facility]

## 2021-12-23 NOTE — PROGRESS NOTES
Educated Pt on how to use the hat to catch the urine needed for urinalysis. Also educated pt that she will need to come out of room today and attempt to be more active.  Pt resistant at first but then came out and sat with fellow female pt to eat breakfast. This is a huge improvements and big step for her

## 2021-12-24 LAB
GLUCOSE BLD-MCNC: 109 MG/DL (ref 70–99)
GLUCOSE BLD-MCNC: 121 MG/DL (ref 70–99)
PERFORMED ON: ABNORMAL
PERFORMED ON: ABNORMAL
URINE CULTURE, ROUTINE: NORMAL

## 2021-12-24 PROCEDURE — 99233 SBSQ HOSP IP/OBS HIGH 50: CPT

## 2021-12-24 PROCEDURE — 6370000000 HC RX 637 (ALT 250 FOR IP)

## 2021-12-24 PROCEDURE — 6370000000 HC RX 637 (ALT 250 FOR IP): Performed by: PSYCHIATRY & NEUROLOGY

## 2021-12-24 PROCEDURE — 1240000000 HC EMOTIONAL WELLNESS R&B

## 2021-12-24 RX ORDER — ONDANSETRON 4 MG/1
4 TABLET, ORALLY DISINTEGRATING ORAL EVERY 8 HOURS PRN
Status: DISCONTINUED | OUTPATIENT
Start: 2021-12-24 | End: 2021-12-30 | Stop reason: HOSPADM

## 2021-12-24 RX ADMIN — MIRTAZAPINE 7.5 MG: 15 TABLET, FILM COATED ORAL at 20:48

## 2021-12-24 RX ADMIN — ATORVASTATIN CALCIUM 20 MG: 10 TABLET, FILM COATED ORAL at 20:52

## 2021-12-24 RX ADMIN — CEPHALEXIN 500 MG: 500 CAPSULE ORAL at 20:52

## 2021-12-24 RX ADMIN — AMLODIPINE BESYLATE 5 MG: 5 TABLET ORAL at 08:58

## 2021-12-24 RX ADMIN — LOSARTAN POTASSIUM 100 MG: 100 TABLET, FILM COATED ORAL at 08:58

## 2021-12-24 RX ADMIN — LATANOPROST 1 DROP: 50 SOLUTION OPHTHALMIC at 20:53

## 2021-12-24 RX ADMIN — CEPHALEXIN 500 MG: 500 CAPSULE ORAL at 08:58

## 2021-12-24 RX ADMIN — METFORMIN HYDROCHLORIDE 500 MG: 500 TABLET ORAL at 17:52

## 2021-12-24 RX ADMIN — DIAZEPAM 2 MG: 2 TABLET ORAL at 20:51

## 2021-12-24 RX ADMIN — METOPROLOL SUCCINATE 25 MG: 25 TABLET, EXTENDED RELEASE ORAL at 09:00

## 2021-12-24 RX ADMIN — DORZOLAMIDE HYDROCHLORIDE AND TIMOLOL MALEATE 1 DROP: 20; 5 SOLUTION/ DROPS OPHTHALMIC at 09:05

## 2021-12-24 RX ADMIN — OXYCODONE HYDROCHLORIDE AND ACETAMINOPHEN 500 MG: 500 TABLET ORAL at 08:59

## 2021-12-24 RX ADMIN — SERTRALINE 150 MG: 100 TABLET, FILM COATED ORAL at 20:50

## 2021-12-24 RX ADMIN — OLANZAPINE 2.5 MG: 5 TABLET, FILM COATED ORAL at 20:51

## 2021-12-24 RX ADMIN — ASPIRIN 81 MG: 81 TABLET, CHEWABLE ORAL at 08:58

## 2021-12-24 RX ADMIN — HYDROCHLOROTHIAZIDE 25 MG: 25 TABLET ORAL at 08:58

## 2021-12-24 RX ADMIN — DORZOLAMIDE HYDROCHLORIDE AND TIMOLOL MALEATE 1 DROP: 20; 5 SOLUTION/ DROPS OPHTHALMIC at 20:53

## 2021-12-24 RX ADMIN — DIAZEPAM 2 MG: 2 TABLET ORAL at 08:59

## 2021-12-24 RX ADMIN — METOPROLOL SUCCINATE 100 MG: 50 TABLET, EXTENDED RELEASE ORAL at 08:58

## 2021-12-24 RX ADMIN — METFORMIN HYDROCHLORIDE 500 MG: 500 TABLET ORAL at 08:58

## 2021-12-24 RX ADMIN — POTASSIUM CHLORIDE 20 MEQ: 1500 TABLET, EXTENDED RELEASE ORAL at 08:59

## 2021-12-24 ASSESSMENT — PAIN SCALES - GENERAL
PAINLEVEL_OUTOF10: 0

## 2021-12-24 NOTE — PLAN OF CARE
Pt A+Ox4, anxious, fearful, isolated to room, stutters and trembles when trying to talk. She is medication compliant and denies SI/HI/AVH and no RTIS noted. She denies any other needs at this time.

## 2021-12-24 NOTE — PLAN OF CARE
Problem: Anxiety:  Goal: Level of anxiety will decrease  Description: Level of anxiety will decrease  Outcome: Ongoing   Pt mood and demeanor improved from yesterday afternoon. She did allow this writer to give her a shower and wash her clothes. Her dirty close smelled foul and she had not changed her panty liner in quite a while. This was even more evident as she was getting showered. Her facial hair was removed to her satisfaction. Denies SI/HI/AVH.  Compliant with meds and ate more than 50% of meals

## 2021-12-24 NOTE — PROGRESS NOTES
Department of Psychiatry  AttendingProgress Note  Chief Complaint: \"I'm ok, I guess\"    Patient's chart was reviewed and collaborated with  about the treatment plan. SUBJECTIVE:    Pt in her room, lying bed. Pt states that she still feels anxious, but ok. Pt states she slept good last night, and ate a good breakfast this morning. Pt walked to the common area this morning for her breakfast, and was speaking with staff at that time. Pt's speech was more clear this morning, no stuttering noticed during questions. Pt states that she thought she had had an accident and needed help getting cleaned up. Pt asked if she wanted to walk to the bathroom, and she asked for a shower. When asked if she was going to take a shower, pt responded \"I think I will need to\". Nursing notified of request.  Pt was started on antibiotics for UTI yesterday. Pt able to discuss this new diagnosis and understands the treatment changes as well. Thoughts remain clear, denies any thoughts of hurting herself, confirms that she feels safe at this time. Patient is feeling better. Suicidal ideation:  denies suicidal ideation. Patient does not have medication side effects. ROS: Patient has new complaints: no  Sleeping adequately:  Yes   Appetite adequate: Yes  Attending groups: No: Pt is walking out of her room now. Today without being asked  Visitors:Yes    OBJECTIVE    Physical  VITALS:  /68   Pulse 88   Temp 97.4 °F (36.3 °C) (Temporal)   Resp 18   Ht 5' (1.524 m)   Wt 180 lb 6.4 oz (81.8 kg)   LMP  (LMP Unknown)   SpO2 95%   BMI 35.23 kg/m²     Mental Status Examination:  Patients appearance was well-appearing, street clothes and lying in bed. Thoughts are Logical. Homicidal ideations none. No abnormal movements, tics or mannerisms. Memory intact Aims 0. Concentration Fair. Alert and oriented X 4. Insight and Judgement impaired judgment. Patient was cooperative. Patient gait in bed at this time. Mood anxious, affect anxiety Hallucinations Absent, suicidal ideations no specific plan to harm self Speech slow but more clear today  Data  Labs:   Admission on 12/16/2021   Component Date Value Ref Range Status    POC Glucose 12/17/2021 168* 70 - 99 mg/dl Final    Performed on 12/17/2021 ACCU-CHEK   Final    POC Glucose 12/17/2021 141* 70 - 99 mg/dl Final    Performed on 12/17/2021 ACCU-CHEK   Final    Potassium 12/18/2021 2.9* 3.5 - 5.1 mmol/L Final    Magnesium 12/18/2021 1.90  1.80 - 2.40 mg/dL Final    Potassium 12/18/2021 4.0  3.5 - 5.1 mmol/L Final    POC Glucose 12/18/2021 122* 70 - 99 mg/dl Final    Performed on 12/18/2021 ACCU-CHEK   Final    POC Glucose 12/18/2021 116* 70 - 99 mg/dl Final    Performed on 12/18/2021 ACCU-CHEK   Final    Sodium 12/19/2021 141  136 - 145 mmol/L Final    Potassium reflex Magnesium 12/19/2021 3.4* 3.5 - 5.1 mmol/L Final    Chloride 12/19/2021 104  99 - 110 mmol/L Final    CO2 12/19/2021 26  21 - 32 mmol/L Final    Anion Gap 12/19/2021 11  3 - 16 Final    Glucose 12/19/2021 107* 70 - 99 mg/dL Final    BUN 12/19/2021 21* 7 - 20 mg/dL Final    CREATININE 12/19/2021 1.0  0.6 - 1.2 mg/dL Final    GFR Non- 12/19/2021 54* >60 Final    Comment: >60 mL/min/1.73m2 EGFR, calc. for ages 25 and older using the  MDRD formula (not corrected for weight), is valid for stable  renal function.  GFR  12/19/2021 >60  >60 Final    Comment: Chronic Kidney Disease: less than 60 ml/min/1.73 sq.m. Kidney Failure: less than 15 ml/min/1.73 sq.m. Results valid for patients 18 years and older.       Calcium 12/19/2021 10.0  8.3 - 10.6 mg/dL Final    POC Glucose 12/19/2021 103* 70 - 99 mg/dl Final    Performed on 12/19/2021 ACCU-CHEK   Final    Magnesium 12/19/2021 1.90  1.80 - 2.40 mg/dL Final    POC Glucose 12/19/2021 130* 70 - 99 mg/dl Final    Performed on 12/19/2021 ACCU-CHEK   Final    POC Glucose 12/20/2021 125* 70 - 99 mg/dl Final    Performed on 12/20/2021 ACCU-CHEK   Final    POC Glucose 12/20/2021 115* 70 - 99 mg/dl Final    Performed on 12/20/2021 ACCU-CHEK   Final    POC Glucose 12/20/2021 129* 70 - 99 mg/dl Final    Performed on 12/20/2021 ACCU-CHEK   Final    Sodium 12/21/2021 142  136 - 145 mmol/L Final    Potassium reflex Magnesium 12/21/2021 3.2* 3.5 - 5.1 mmol/L Final    Chloride 12/21/2021 102  99 - 110 mmol/L Final    CO2 12/21/2021 30  21 - 32 mmol/L Final    Anion Gap 12/21/2021 10  3 - 16 Final    Glucose 12/21/2021 108* 70 - 99 mg/dL Final    BUN 12/21/2021 16  7 - 20 mg/dL Final    CREATININE 12/21/2021 0.9  0.6 - 1.2 mg/dL Final    GFR Non- 12/21/2021 >60  >60 Final    Comment: >60 mL/min/1.73m2 EGFR, calc. for ages 25 and older using the  MDRD formula (not corrected for weight), is valid for stable  renal function.  GFR  12/21/2021 >60  >60 Final    Comment: Chronic Kidney Disease: less than 60 ml/min/1.73 sq.m. Kidney Failure: less than 15 ml/min/1.73 sq.m. Results valid for patients 18 years and older.       Calcium 12/21/2021 10.6  8.3 - 10.6 mg/dL Final    POC Glucose 12/21/2021 99  70 - 99 mg/dl Final    Performed on 12/21/2021 ACCU-CHEK   Final    Magnesium 12/21/2021 1.90  1.80 - 2.40 mg/dL Final    POC Glucose 12/21/2021 126* 70 - 99 mg/dl Final    Performed on 12/21/2021 ACCU-CHEK   Final    POC Glucose 12/22/2021 108* 70 - 99 mg/dl Final    Performed on 12/22/2021 ACCU-CHEK   Final    POC Glucose 12/22/2021 107* 70 - 99 mg/dl Final    Performed on 12/22/2021 ACCU-CHEK   Final    Sodium 12/23/2021 142  136 - 145 mmol/L Final    Potassium reflex Magnesium 12/23/2021 3.5  3.5 - 5.1 mmol/L Final    Chloride 12/23/2021 104  99 - 110 mmol/L Final    CO2 12/23/2021 27  21 - 32 mmol/L Final    Anion Gap 12/23/2021 11  3 - 16 Final    Glucose 12/23/2021 128* 70 - 99 mg/dL Final    BUN 12/23/2021 20  7 - 20 mg/dL Final    CREATININE 12/23/2021 0.8  0.6 - 1.2 mg/dL Final    GFR Non- 12/23/2021 >60  >60 Final    Comment: >60 mL/min/1.73m2 EGFR, calc. for ages 25 and older using the  MDRD formula (not corrected for weight), is valid for stable  renal function.  GFR  12/23/2021 >60  >60 Final    Comment: Chronic Kidney Disease: less than 60 ml/min/1.73 sq.m. Kidney Failure: less than 15 ml/min/1.73 sq.m. Results valid for patients 18 years and older.  Calcium 12/23/2021 10.4  8.3 - 10.6 mg/dL Final    POC Glucose 12/23/2021 109* 70 - 99 mg/dl Final    Performed on 12/23/2021 ACCU-CHEK   Final    Color, UA 12/23/2021 Yellow  Straw/Yellow Final    Clarity, UA 12/23/2021 Clear  Clear Final    Glucose, Ur 12/23/2021 Negative  Negative mg/dL Final    Bilirubin Urine 12/23/2021 Negative  Negative Final    Ketones, Urine 12/23/2021 Negative  Negative mg/dL Final    Specific Gravity, UA 12/23/2021 1.010  1.005 - 1.030 Final    Blood, Urine 12/23/2021 Negative  Negative Final    pH, UA 12/23/2021 6.5  5.0 - 8.0 Final    Protein, UA 12/23/2021 Negative  Negative mg/dL Final    Urobilinogen, Urine 12/23/2021 0.2  <2.0 E.U./dL Final    Nitrite, Urine 12/23/2021 Negative  Negative Final    Leukocyte Esterase, Urine 12/23/2021 LARGE* Negative Final    Microscopic Examination 12/23/2021 YES   Final    Urine Type 12/23/2021 NotGiven   Final    Urine received in a container without preservatives.  Urine Reflex to Culture 12/23/2021 Yes   Final    Magnesium 12/23/2021 1.60* 1.80 - 2.40 mg/dL Final    Hyaline Casts, UA 12/23/2021 3-5* 0 - 2 /LPF Final    Mucus, UA 12/23/2021 3+* None Seen /LPF Final    WBC, UA 12/23/2021 * 0 - 5 /HPF Final    RBC, UA 12/23/2021 3-4  0 - 4 /HPF Final    Epithelial Cells, UA 12/23/2021 6-10* 0 - 5 /HPF Final    Bacteria, UA 12/23/2021 2+* None Seen /HPF Final    Urine Culture, Routine 12/23/2021 <50,000 CFU/ml mixed skin/urogenital timothy.  No further workup   Final    POC Glucose 12/24/2021 121* 70 - 99 mg/dl Final    Performed on 12/24/2021 ACCU-CHEK   Final            Medications  Current Facility-Administered Medications: ondansetron (ZOFRAN-ODT) disintegrating tablet 4 mg, 4 mg, Oral, Q8H PRN  OLANZapine (ZYPREXA) tablet 2.5 mg, 2.5 mg, Oral, Nightly  mirtazapine (REMERON) tablet 7.5 mg, 7.5 mg, Oral, Nightly  OLANZapine (ZYPREXA) tablet 2.5 mg, 2.5 mg, Oral, Q8H PRN **OR** OLANZapine (ZYPREXA) injection 2.5 mg, 2.5 mg, IntraMUSCular, Q8H PRN  cephALEXin (KEFLEX) capsule 500 mg, 500 mg, Oral, BID  ascorbic acid (VITAMIN C) tablet 500 mg, 500 mg, Oral, Daily  potassium chloride (KLOR-CON M) extended release tablet 20 mEq, 20 mEq, Oral, Daily with breakfast  diazePAM (VALIUM) tablet 2 mg, 2 mg, Oral, BID  acetaminophen (TYLENOL) tablet 650 mg, 650 mg, Oral, Q4H PRN  ibuprofen (ADVIL;MOTRIN) tablet 400 mg, 400 mg, Oral, Q6H PRN  magnesium hydroxide (MILK OF MAGNESIA) 400 MG/5ML suspension 30 mL, 30 mL, Oral, Daily PRN  aluminum & magnesium hydroxide-simethicone (MAALOX) 200-200-20 MG/5ML suspension 30 mL, 30 mL, Oral, Q6H PRN  sterile water injection 2.1 mL, 2.1 mL, IntraMUSCular, Q4H PRN  benztropine mesylate (COGENTIN) injection 2 mg, 2 mg, IntraMUSCular, BID PRN  nicotine polacrilex (COMMIT) lozenge 2 mg, 2 mg, Oral, Q1H PRN  traZODone (DESYREL) tablet 50 mg, 50 mg, Oral, Nightly PRN  hydrOXYzine (VISTARIL) capsule 50 mg, 50 mg, Oral, Q6H PRN  aspirin chewable tablet 81 mg, 81 mg, Oral, Daily  sertraline (ZOLOFT) tablet 150 mg, 150 mg, Oral, Nightly  dorzolamide-timolol (COSOPT) 22.3-6.8 MG/ML ophthalmic solution 1 drop, 1 drop, Ophthalmic, BID  atorvastatin (LIPITOR) tablet 20 mg, 20 mg, Oral, Nightly  metFORMIN (GLUCOPHAGE) tablet 500 mg, 500 mg, Oral, BID WC  metoprolol succinate (TOPROL XL) extended release tablet 25 mg, 25 mg, Oral, Daily  metoprolol succinate (TOPROL XL) extended release tablet 100 mg, 100 mg, Oral, Daily  amLODIPine (NORVASC) tablet 5 mg, 5 mg, Oral, Daily  latanoprost (XALATAN) 0.005 % ophthalmic solution 1 drop, 1 drop, Both Eyes, Nightly  losartan (COZAAR) tablet 100 mg, 100 mg, Oral, Daily **AND** hydroCHLOROthiazide (HYDRODIURIL) tablet 25 mg, 25 mg, Oral, Daily    ASSESSMENT AND PLAN    Principal Problem:    Generalized anxiety disorder  Active Problems:    Type 2 diabetes mellitus without complication, without long-term current use of insulin ([facility])    Major depression, single episode    Hypokalemia    Hypomagnesemia    Asymptomatic bacteriuria  Resolved Problems:    * No resolved hospital problems. *       1. Patient's symptoms   are improving  2. Probable discharge is next week  3. Discharge planning is incomplete  4. Suicidal ideation is unchanged, none  5. Total time with patient was 40 minutes and more than 50 % of that time was spent counseling the patient on their symptoms, treatment and expected goals.      Carolann Cristina [facility]

## 2021-12-24 NOTE — PROGRESS NOTES
Protein Oral Supplement    Anthropometric Measures:  · Height: 5' (152.4 cm)  · Current Body Weight: 180 lb 6.4 oz (81.8 kg) (obtained 12/17)   · Admission Body Weight: 180 lb 6.4 oz (81.8 kg) (obtained 12/17)    · Usual Body Weight: 200 lb 3.2 oz (90.8 kg) (obtained 11/4/21)     · Ideal Body Weight: 100 lbs; % Ideal Body Weight 180.4 %   · BMI: 35.2  · BMI Categories: Obese Class 2 (BMI 35.0 -39.9)       Nutrition Diagnosis:   · Unintended weight loss related to inadequate protein-energy intake,psychological cause or life stress as evidenced by poor intake prior to admission,weight loss greater than or equal to 5% in 1 month,intake 26-50%,intake 51-75%,other (comment) (ongoing anxiety)    Nutrition Interventions:   Food and/or Nutrient Delivery:  Continue Current Diet,Continue Oral Nutrition Supplement  Nutrition Education/Counseling:  No recommendation at this time   Coordination of Nutrition Care:  Continue to monitor while inpatient    Goals:  pt will continue to accept and consume > 50% of meals and supps       Nutrition Monitoring and Evaluation:   Behavioral-Environmental Outcomes:  None Identified   Food/Nutrient Intake Outcomes:  Food and Nutrient Intake,Supplement Intake  Physical Signs/Symptoms Outcomes:  Biochemical Data,Weight,Nutrition Focused Physical Findings,Meal Time Behavior     Discharge Planning:    Continue current diet,Continue Oral Nutrition Supplement     Electronically signed by Genaro Ma RD, LD on 12/24/21 at 2:08 PM EST    Contact: [phone]

## 2021-12-24 NOTE — FLOWSHEET NOTE
Senior Purposeful Rounding     Position:Repositions Self     Physical Environment:Room free from clutter, Clear path to bathroom , Adequate lighting and No safety hazards noted     Pain Rating/ Nonverbal Pain Behaviors:denies;      Pain interventions Attempted: none     Patient Toileted: independent

## 2021-12-24 NOTE — PLAN OF CARE
Nutrition Problem #1: Unintended weight loss  Intervention: Food and/or Nutrient Delivery: Continue Current Diet,Continue Oral Nutrition Supplement  Nutritional Goals: pt will continue to accept and consume > 50% of meals and supps

## 2021-12-25 LAB
GLUCOSE BLD-MCNC: 112 MG/DL (ref 70–99)
PERFORMED ON: ABNORMAL

## 2021-12-25 PROCEDURE — 6370000000 HC RX 637 (ALT 250 FOR IP)

## 2021-12-25 PROCEDURE — 6370000000 HC RX 637 (ALT 250 FOR IP): Performed by: PSYCHIATRY & NEUROLOGY

## 2021-12-25 PROCEDURE — 6360000002 HC RX W HCPCS

## 2021-12-25 PROCEDURE — 1240000000 HC EMOTIONAL WELLNESS R&B

## 2021-12-25 RX ADMIN — AMLODIPINE BESYLATE 5 MG: 5 TABLET ORAL at 08:07

## 2021-12-25 RX ADMIN — OLANZAPINE 2.5 MG: 5 TABLET, FILM COATED ORAL at 20:46

## 2021-12-25 RX ADMIN — CEPHALEXIN 500 MG: 500 CAPSULE ORAL at 20:46

## 2021-12-25 RX ADMIN — DIAZEPAM 2 MG: 2 TABLET ORAL at 08:14

## 2021-12-25 RX ADMIN — DORZOLAMIDE HYDROCHLORIDE AND TIMOLOL MALEATE 1 DROP: 20; 5 SOLUTION/ DROPS OPHTHALMIC at 08:14

## 2021-12-25 RX ADMIN — HYDROCHLOROTHIAZIDE 25 MG: 25 TABLET ORAL at 08:08

## 2021-12-25 RX ADMIN — ONDANSETRON 4 MG: 4 TABLET, ORALLY DISINTEGRATING ORAL at 08:07

## 2021-12-25 RX ADMIN — SERTRALINE 150 MG: 100 TABLET, FILM COATED ORAL at 20:46

## 2021-12-25 RX ADMIN — METOPROLOL SUCCINATE 100 MG: 50 TABLET, EXTENDED RELEASE ORAL at 08:06

## 2021-12-25 RX ADMIN — ASPIRIN 81 MG: 81 TABLET, CHEWABLE ORAL at 08:07

## 2021-12-25 RX ADMIN — DIAZEPAM 2 MG: 2 TABLET ORAL at 20:46

## 2021-12-25 RX ADMIN — POTASSIUM CHLORIDE 20 MEQ: 1500 TABLET, EXTENDED RELEASE ORAL at 08:07

## 2021-12-25 RX ADMIN — LOSARTAN POTASSIUM 100 MG: 100 TABLET, FILM COATED ORAL at 08:06

## 2021-12-25 RX ADMIN — METOPROLOL SUCCINATE 25 MG: 25 TABLET, EXTENDED RELEASE ORAL at 08:09

## 2021-12-25 RX ADMIN — CEPHALEXIN 500 MG: 500 CAPSULE ORAL at 08:06

## 2021-12-25 RX ADMIN — BENZTROPINE MESYLATE 2 MG: 1 INJECTION INTRAMUSCULAR; INTRAVENOUS at 08:13

## 2021-12-25 RX ADMIN — DORZOLAMIDE HYDROCHLORIDE AND TIMOLOL MALEATE 1 DROP: 20; 5 SOLUTION/ DROPS OPHTHALMIC at 20:46

## 2021-12-25 RX ADMIN — LATANOPROST 1 DROP: 50 SOLUTION OPHTHALMIC at 20:45

## 2021-12-25 RX ADMIN — METFORMIN HYDROCHLORIDE 500 MG: 500 TABLET ORAL at 08:07

## 2021-12-25 RX ADMIN — MIRTAZAPINE 7.5 MG: 15 TABLET, FILM COATED ORAL at 20:46

## 2021-12-25 RX ADMIN — OXYCODONE HYDROCHLORIDE AND ACETAMINOPHEN 500 MG: 500 TABLET ORAL at 08:07

## 2021-12-25 RX ADMIN — ATORVASTATIN CALCIUM 20 MG: 10 TABLET, FILM COATED ORAL at 20:47

## 2021-12-25 RX ADMIN — METFORMIN HYDROCHLORIDE 500 MG: 500 TABLET ORAL at 18:08

## 2021-12-25 ASSESSMENT — PAIN SCALES - GENERAL: PAINLEVEL_OUTOF10: 0

## 2021-12-25 NOTE — PLAN OF CARE
Problem: Falls - Risk of:  Goal: Will remain free from falls  Description: Will remain free from falls  Outcome: Ongoing     Problem: Altered Mood, Depressive Behavior:  Goal: Able to verbalize acceptance of life and situations over which he or she has no control  Description: Able to verbalize acceptance of life and situations over which he or she has no control  Outcome: Ongoing    Pt has been isolative to her room this shift. Encouraged pt to come in the day room but she refused. She was very anxious at the start of the shift. She was unable to speak full sentences and was stuttering. Her scheduled medications were effective with her anxiety. She ate a small portion of her dinner. She drank some water. She denies SI/HI/AVH and no RTIS noted.  Denies needs currently

## 2021-12-25 NOTE — PLAN OF CARE
Problem: Altered Mood, Depressive Behavior:  Goal: Able to verbalize acceptance of life and situations over which he or she has no control  Description: Able to verbalize acceptance of life and situations over which he or she has no control  Outcome: Ongoing     Problem: Anxiety:  Goal: Level of anxiety will decrease  Description: Level of anxiety will decrease  Outcome: Ongoing     Pt continues to exhibit behaviors of helplessness but these miraculously dissipate. when  present she was able to take a sponge bath and dress herself this morning. Denies SI/HI/AVH at this time.  Took all meds without difficulty, no PRNS today

## 2021-12-26 LAB
GLUCOSE BLD-MCNC: 113 MG/DL (ref 70–99)
GLUCOSE BLD-MCNC: 88 MG/DL (ref 70–99)
PERFORMED ON: ABNORMAL
PERFORMED ON: NORMAL

## 2021-12-26 PROCEDURE — 1240000000 HC EMOTIONAL WELLNESS R&B

## 2021-12-26 PROCEDURE — 6370000000 HC RX 637 (ALT 250 FOR IP): Performed by: PSYCHIATRY & NEUROLOGY

## 2021-12-26 PROCEDURE — 6370000000 HC RX 637 (ALT 250 FOR IP)

## 2021-12-26 PROCEDURE — 99232 SBSQ HOSP IP/OBS MODERATE 35: CPT | Performed by: PSYCHIATRY & NEUROLOGY

## 2021-12-26 RX ADMIN — METFORMIN HYDROCHLORIDE 500 MG: 500 TABLET ORAL at 17:07

## 2021-12-26 RX ADMIN — ATORVASTATIN CALCIUM 20 MG: 10 TABLET, FILM COATED ORAL at 20:51

## 2021-12-26 RX ADMIN — HYDROXYZINE PAMOATE 50 MG: 50 CAPSULE ORAL at 17:07

## 2021-12-26 RX ADMIN — ASPIRIN 81 MG: 81 TABLET, CHEWABLE ORAL at 09:35

## 2021-12-26 RX ADMIN — POTASSIUM CHLORIDE 20 MEQ: 1500 TABLET, EXTENDED RELEASE ORAL at 09:35

## 2021-12-26 RX ADMIN — DIAZEPAM 2 MG: 2 TABLET ORAL at 09:36

## 2021-12-26 RX ADMIN — METOPROLOL SUCCINATE 25 MG: 25 TABLET, EXTENDED RELEASE ORAL at 09:35

## 2021-12-26 RX ADMIN — CEPHALEXIN 500 MG: 500 CAPSULE ORAL at 20:51

## 2021-12-26 RX ADMIN — AMLODIPINE BESYLATE 5 MG: 5 TABLET ORAL at 09:36

## 2021-12-26 RX ADMIN — HYDROCHLOROTHIAZIDE 25 MG: 25 TABLET ORAL at 09:36

## 2021-12-26 RX ADMIN — LOSARTAN POTASSIUM 100 MG: 100 TABLET, FILM COATED ORAL at 09:36

## 2021-12-26 RX ADMIN — METFORMIN HYDROCHLORIDE 500 MG: 500 TABLET ORAL at 09:36

## 2021-12-26 RX ADMIN — METOPROLOL SUCCINATE 100 MG: 50 TABLET, EXTENDED RELEASE ORAL at 09:36

## 2021-12-26 RX ADMIN — CEPHALEXIN 500 MG: 500 CAPSULE ORAL at 09:36

## 2021-12-26 RX ADMIN — OLANZAPINE 2.5 MG: 5 TABLET, FILM COATED ORAL at 20:51

## 2021-12-26 RX ADMIN — DIAZEPAM 2 MG: 2 TABLET ORAL at 20:51

## 2021-12-26 RX ADMIN — MIRTAZAPINE 7.5 MG: 15 TABLET, FILM COATED ORAL at 20:51

## 2021-12-26 RX ADMIN — LATANOPROST 1 DROP: 50 SOLUTION OPHTHALMIC at 20:55

## 2021-12-26 RX ADMIN — SERTRALINE 150 MG: 100 TABLET, FILM COATED ORAL at 20:51

## 2021-12-26 RX ADMIN — DORZOLAMIDE HYDROCHLORIDE AND TIMOLOL MALEATE 1 DROP: 20; 5 SOLUTION/ DROPS OPHTHALMIC at 20:55

## 2021-12-26 RX ADMIN — OXYCODONE HYDROCHLORIDE AND ACETAMINOPHEN 500 MG: 500 TABLET ORAL at 09:36

## 2021-12-26 RX ADMIN — DORZOLAMIDE HYDROCHLORIDE AND TIMOLOL MALEATE 1 DROP: 20; 5 SOLUTION/ DROPS OPHTHALMIC at 09:35

## 2021-12-26 ASSESSMENT — PAIN SCALES - GENERAL
PAINLEVEL_OUTOF10: 0
PAINLEVEL_OUTOF10: 0

## 2021-12-26 NOTE — BH NOTE
Purposeful Rounding    Patient concerns reported:NONE NOTED.  PT IN BED SLEEPING    Nurse made aware:NO    Patient Turned and repositioned: Independent    Patient Toileted: Independent    Fall Precautions in Place: Yellow non-skid socks on, Bed locked in low position, 1/2 bed rails up, Lighting appropriate, Room free of clutter and Clear path to bathroom      Electronically signed by Yordan Bonilla on 12/26/21 at 9:01 AM EST

## 2021-12-26 NOTE — BH NOTE
Senior Purposeful Rounding     Position:Repositions self     Physical Environment:Room free from clutter, Clear path to bathroom , Adequate lighting and No safety hazards noted     Pain Rating/ Nonverbal Pain Behaviors:0, asleep     Pain interventions Attempted: n/a     Patient Toileted:Independent

## 2021-12-26 NOTE — BH NOTE
Senior Purposeful Rounding    Position:Sitting    Physical Environment:Room free from clutter, Clear path to bathroom , Adequate lighting and No safety hazards noted    Pain Rating/ Nonverbal Pain Behaviors:denies; 0    Pain interventions Attempted: n/a    Patient Toileted:Independent

## 2021-12-26 NOTE — BH NOTE
Purposeful Rounding    Patient concerns reported:NONE NOTED.  PT IN BED SLEEPING    Nurse made aware:NO    Patient Turned and repositioned: Independent    Patient Toileted: Independent    Fall Precautions in Place: Yellow non-skid socks on, Bed locked in low position, 1/2 bed rails up, Lighting appropriate, Room free of clutter and Clear path to bathroom      Electronically signed by Riri Larios on 12/26/21 at 7:54 AM EST

## 2021-12-26 NOTE — BH NOTE
Purposeful Rounding    Patient concerns reported:NONE NOTED.  PT IN BED SLEEPING    Nurse made aware:NO    Patient Turned and repositioned: Independent    Patient Toileted: Independent    Fall Precautions in Place: Yellow non-skid socks on, Bed locked in low position, 1/2 bed rails up, Lighting appropriate, Room free of clutter and Clear path to bathroom      Electronically signed by Sandor Burton on 12/26/21 at 5:47 AM EST

## 2021-12-26 NOTE — BH NOTE
Purposeful Rounding    Patient concerns reported:NONE NOTED.  PT IN BED SLEEPING    Nurse made aware:NO    Patient Turned and repositioned: Independent    Patient Toileted: Independent    Fall Precautions in Place: Yellow non-skid socks on, Bed locked in low position, 1/2 bed rails up, Lighting appropriate, Room free of clutter and Clear path to bathroom      Electronically signed by Julio C Ball on 12/26/21 at 1:07 AM EST

## 2021-12-27 LAB
GLUCOSE BLD-MCNC: 124 MG/DL (ref 70–99)
PERFORMED ON: ABNORMAL

## 2021-12-27 PROCEDURE — 6370000000 HC RX 637 (ALT 250 FOR IP)

## 2021-12-27 PROCEDURE — 97535 SELF CARE MNGMENT TRAINING: CPT

## 2021-12-27 PROCEDURE — 6370000000 HC RX 637 (ALT 250 FOR IP): Performed by: PSYCHIATRY & NEUROLOGY

## 2021-12-27 PROCEDURE — 97530 THERAPEUTIC ACTIVITIES: CPT

## 2021-12-27 PROCEDURE — 1240000000 HC EMOTIONAL WELLNESS R&B

## 2021-12-27 PROCEDURE — 99233 SBSQ HOSP IP/OBS HIGH 50: CPT

## 2021-12-27 RX ADMIN — ATORVASTATIN CALCIUM 20 MG: 10 TABLET, FILM COATED ORAL at 20:10

## 2021-12-27 RX ADMIN — METOPROLOL SUCCINATE 100 MG: 50 TABLET, EXTENDED RELEASE ORAL at 08:32

## 2021-12-27 RX ADMIN — CEPHALEXIN 500 MG: 500 CAPSULE ORAL at 08:28

## 2021-12-27 RX ADMIN — LATANOPROST 1 DROP: 50 SOLUTION OPHTHALMIC at 20:10

## 2021-12-27 RX ADMIN — IBUPROFEN 400 MG: 400 TABLET, FILM COATED ORAL at 08:29

## 2021-12-27 RX ADMIN — OLANZAPINE 2.5 MG: 5 TABLET, FILM COATED ORAL at 20:10

## 2021-12-27 RX ADMIN — ONDANSETRON 4 MG: 4 TABLET, ORALLY DISINTEGRATING ORAL at 08:31

## 2021-12-27 RX ADMIN — OLANZAPINE 2.5 MG: 5 TABLET, FILM COATED ORAL at 08:29

## 2021-12-27 RX ADMIN — AMLODIPINE BESYLATE 5 MG: 5 TABLET ORAL at 08:31

## 2021-12-27 RX ADMIN — OXYCODONE HYDROCHLORIDE AND ACETAMINOPHEN 500 MG: 500 TABLET ORAL at 08:31

## 2021-12-27 RX ADMIN — SERTRALINE 150 MG: 100 TABLET, FILM COATED ORAL at 20:10

## 2021-12-27 RX ADMIN — DIAZEPAM 2 MG: 2 TABLET ORAL at 08:29

## 2021-12-27 RX ADMIN — METFORMIN HYDROCHLORIDE 500 MG: 500 TABLET ORAL at 08:29

## 2021-12-27 RX ADMIN — MIRTAZAPINE 7.5 MG: 15 TABLET, FILM COATED ORAL at 20:10

## 2021-12-27 RX ADMIN — METFORMIN HYDROCHLORIDE 500 MG: 500 TABLET ORAL at 16:02

## 2021-12-27 RX ADMIN — DIAZEPAM 2 MG: 2 TABLET ORAL at 20:10

## 2021-12-27 RX ADMIN — HYDROCHLOROTHIAZIDE 25 MG: 25 TABLET ORAL at 08:29

## 2021-12-27 RX ADMIN — DORZOLAMIDE HYDROCHLORIDE AND TIMOLOL MALEATE 1 DROP: 20; 5 SOLUTION/ DROPS OPHTHALMIC at 08:38

## 2021-12-27 RX ADMIN — POTASSIUM CHLORIDE 20 MEQ: 1500 TABLET, EXTENDED RELEASE ORAL at 08:29

## 2021-12-27 RX ADMIN — LOSARTAN POTASSIUM 100 MG: 100 TABLET, FILM COATED ORAL at 08:31

## 2021-12-27 RX ADMIN — METOPROLOL SUCCINATE 25 MG: 25 TABLET, EXTENDED RELEASE ORAL at 08:34

## 2021-12-27 RX ADMIN — CEPHALEXIN 500 MG: 500 CAPSULE ORAL at 20:10

## 2021-12-27 RX ADMIN — DORZOLAMIDE HYDROCHLORIDE AND TIMOLOL MALEATE 1 DROP: 20; 5 SOLUTION/ DROPS OPHTHALMIC at 20:10

## 2021-12-27 RX ADMIN — ASPIRIN 81 MG: 81 TABLET, CHEWABLE ORAL at 08:29

## 2021-12-27 ASSESSMENT — PAIN SCALES - GENERAL: PAINLEVEL_OUTOF10: 2

## 2021-12-27 NOTE — PROGRESS NOTES
Inpatient Occupational Therapy Treatment Note    Unit:  [facility]  Date:  12/27/2021  Patient Name:    Whitney Flower  Admitting diagnosis:  Major depression, single episode [F32.9]  Admit Date:  12/16/2021  Precautions/Restrictions/WB Status/ Lines/ Wounds/ Oxygen:  Standard BHI Precautions  Fall Risk     History of Present Illness:  Zelda Wright, 12/17/2021:   \" Patient seen in room on Adult Behavioral Unit.   Patient is a 67 y. o. female who presents to the [facility] unit from [facility] ED.  Pt came to ED by squad following increasing anxiety that has caused her to be unable to care for herself safely at home and affected sleep and eating as well.  Pt has also not been taking her medications as they were prescribed.  Pt is a poor historian, becoming increasingly anxious while trying to have a conversation.  Pt begins to stutter and become upset when trying to answer questions, stating \"I'm just so confused\".  Pt's  states that he does help with her medications, but did not know how often she should have been taking each medication.  He describes having to help her more with ADL's and she grows increasingly anxious, unable to eat or care for herself. [facility] do have home health visit twice a week for PT and bathing.  Pts  does not feel that she is safe at home in her current condition and wants to get her medications straight and help her build her strength back up.  Pt states that she can walk short distances in the home but does not walk much outside of the home.  Pt denies suicidal thoughts.  \"     Treatment Number:  3    Treatment Time: 8884-3460  Timed Code Treatment Minutes:  55 minutes   Total Treatment Time:    55  minutes    Staff Recommendations:    Assist of 1  Discharge Recommendations:  Home with PRN assist and Home OT and HHPT    DME needs for discharge:  Needs Met    AM-PAC Score: AM-PAC Inpatient Daily Activity Raw Score: 19   Home Health S4 Level: NA      MOCA:  To be assessed    Subjective:  Pt was found in bed today. Was agreeable to OT tx with encouragement. ADLs:  Self Care: Toileting: NT  Grooming: SBA and cues/encouragement to brush teeth, wash/brush hair (used shampoo cap), apply deodorant  Dressing: SBA and cues for safety (sit to manage socks/pants)/encouragement to change all clothes in conjunction with bathing (bath wipes). MIN A don gym shoes (needs shoehorn). Bathing: SBA and cues/encouragement to complete bathing with bath wipes; patient reports she does not feel bath wipes were adequate and was provided with washcloth/foaming cleanser to clean periarea and underarms a second time. Patient initially reports she needs a shower but doesn't think she can do that. Offered bath wipes and shampoo cap as another alternative and patient became very overwhelmed (stuttering, fidgeting, unable to make a decision). With encouragement, patient participated in bathing/dressing/grooming routine. Pain   No  Rating:NA  Location: NA  Pain Medicine Status: No request made      Cognition    Appears A&O but orientation not directly assessed. Able to follow:  1 step commands    Balance:     Not tested    Bed mobility:  SBA supine to sit    Transfer Training:   Sit to stand:   SBA  Stand to sit:  SBA  Bed to Chair:  SBA  Standard toilet:   Not Tested-declined to sit on toilet when needing to sit for portions of bathing/dressing \"too dirty\"-provided with chair covered in a towel in the bathroom    Activity Tolerance   Pt completed therapy session with No adverse symptoms noted w/activity    Therapeutic Exercise:   NA    Patient Education:   Role of OT and ADL retraining    Positioning Needs: In common room with needs met. Joining in group reminiscing session. Family Present:  No    Assessment: Pt was agreeable to treatment today and was somewhat receptive to OT recommendations.   Pt should continue to benefit from skilled OT tx to maximize independence so that she may eventually return home with the least amount of assistance. GOALS  To be met in 3 Visits:  1. Pt. To verbalize 3 coping skills. MET  2. Pt to complete MOCA.     To be met in 5 Visits:  1. Pt. To complete interest checklist.    2. Pt. To verbalize understanding of sleep hygiene education. MET  3. Pt. To verbalize understanding of 3 communication skills. 4. Pt. To complete daily schedule of healthy activities/routines with minimal assist.  5. Full Body bathing: Independent  6. Full Body dressing: Independent  7. HEP: To complete UE exercises independently, per handout. 8. Pt to actively participate in OT groups at least 1x/week.      Plan: cont with [address], OTR/L 0176          If patient discharges from this facility prior to next visit, this note will serve as the Discharge Summary

## 2021-12-27 NOTE — PROGRESS NOTES
Department of Psychiatry  AttendingProgress Note    Chief Complaint: \"I think I'm still anxious\"    Patient was seen out in the milieu on [place] and has a visit from her family. Today she is in her room. She becomes anxious with me in the room with her. She isn't sure she is much better. SUBJECTIVE:    Patient is feeling unchanged. Suicidal ideation:  denies suicidal ideation. Patient does not have medication side effects. ROS: Patient has new complaints: no  Sleeping adequately:  Yes   Appetite adequate: Yes  Attending groups: No: Anxious. Visitors:Yes    OBJECTIVE    Physical  VITALS:  BP (!) 153/88   Pulse 75   Temp 96.9 °F (36.1 °C) (Temporal)   Resp 16   Ht 5' (1.524 m)   Wt 180 lb 6.4 oz (81.8 kg)   LMP  (LMP Unknown)   SpO2 95%   BMI 35.23 kg/m²     Mental Status Examination:  Patients appearance was street clothes and lying in bed. Thoughts are Logical and Unusual fears. Homicidal ideations none. No abnormal movements, tics or mannerisms. Memory impaired recent memory Aims 0. Concentration Fair. Alert and oriented X 4. Insight and Judgement impaired insight. Patient was cooperative. Patient gait not tested.  Mood anxious, affect anxiety Hallucinations Absent, suicidal ideations no specific plan to harm self Speech increased latency of response  Data  Labs:   Admission on 12/16/2021   Component Date Value Ref Range Status    POC Glucose 12/17/2021 168* 70 - 99 mg/dl Final    Performed on 12/17/2021 ACCU-CHEK   Final    POC Glucose 12/17/2021 141* 70 - 99 mg/dl Final    Performed on 12/17/2021 ACCU-CHEK   Final    Potassium 12/18/2021 2.9* 3.5 - 5.1 mmol/L Final    Magnesium 12/18/2021 1.90  1.80 - 2.40 mg/dL Final    Potassium 12/18/2021 4.0  3.5 - 5.1 mmol/L Final    POC Glucose 12/18/2021 122* 70 - 99 mg/dl Final    Performed on 12/18/2021 ACCU-CHEK   Final    POC Glucose 12/18/2021 116* 70 - 99 mg/dl Final    Performed on 12/18/2021 ACCU-CHEK   Final    Sodium 12/19/2021 141  136 - 145 mmol/L Final    Potassium reflex Magnesium 12/19/2021 3.4* 3.5 - 5.1 mmol/L Final    Chloride 12/19/2021 104  99 - 110 mmol/L Final    CO2 12/19/2021 26  21 - 32 mmol/L Final    Anion Gap 12/19/2021 11  3 - 16 Final    Glucose 12/19/2021 107* 70 - 99 mg/dL Final    BUN 12/19/2021 21* 7 - 20 mg/dL Final    CREATININE 12/19/2021 1.0  0.6 - 1.2 mg/dL Final    GFR Non- 12/19/2021 54* >60 Final    Comment: >60 mL/min/1.73m2 EGFR, calc. for ages 25 and older using the  MDRD formula (not corrected for weight), is valid for stable  renal function.  GFR  12/19/2021 >60  >60 Final    Comment: Chronic Kidney Disease: less than 60 ml/min/1.73 sq.m. Kidney Failure: less than 15 ml/min/1.73 sq.m. Results valid for patients 18 years and older.       Calcium 12/19/2021 10.0  8.3 - 10.6 mg/dL Final    POC Glucose 12/19/2021 103* 70 - 99 mg/dl Final    Performed on 12/19/2021 ACCU-CHEK   Final    Magnesium 12/19/2021 1.90  1.80 - 2.40 mg/dL Final    POC Glucose 12/19/2021 130* 70 - 99 mg/dl Final    Performed on 12/19/2021 ACCU-CHEK   Final    POC Glucose 12/20/2021 125* 70 - 99 mg/dl Final    Performed on 12/20/2021 ACCU-CHEK   Final    POC Glucose 12/20/2021 115* 70 - 99 mg/dl Final    Performed on 12/20/2021 ACCU-CHEK   Final    POC Glucose 12/20/2021 129* 70 - 99 mg/dl Final    Performed on 12/20/2021 ACCU-CHEK   Final    Sodium 12/21/2021 142  136 - 145 mmol/L Final    Potassium reflex Magnesium 12/21/2021 3.2* 3.5 - 5.1 mmol/L Final    Chloride 12/21/2021 102  99 - 110 mmol/L Final    CO2 12/21/2021 30  21 - 32 mmol/L Final    Anion Gap 12/21/2021 10  3 - 16 Final    Glucose 12/21/2021 108* 70 - 99 mg/dL Final    BUN 12/21/2021 16  7 - 20 mg/dL Final    CREATININE 12/21/2021 0.9  0.6 - 1.2 mg/dL Final    GFR Non- 12/21/2021 >60  >60 Final    Comment: >60 mL/min/1.73m2 EGFR, calc. for ages 25 and older using the  MDRD formula (not corrected for weight), is valid for stable  renal function.  GFR  12/21/2021 >60  >60 Final    Comment: Chronic Kidney Disease: less than 60 ml/min/1.73 sq.m. Kidney Failure: less than 15 ml/min/1.73 sq.m. Results valid for patients 18 years and older.  Calcium 12/21/2021 10.6  8.3 - 10.6 mg/dL Final    POC Glucose 12/21/2021 99  70 - 99 mg/dl Final    Performed on 12/21/2021 ACCU-CHEK   Final    Magnesium 12/21/2021 1.90  1.80 - 2.40 mg/dL Final    POC Glucose 12/21/2021 126* 70 - 99 mg/dl Final    Performed on 12/21/2021 ACCU-CHEK   Final    POC Glucose 12/22/2021 108* 70 - 99 mg/dl Final    Performed on 12/22/2021 ACCU-CHEK   Final    POC Glucose 12/22/2021 107* 70 - 99 mg/dl Final    Performed on 12/22/2021 ACCU-CHEK   Final    Sodium 12/23/2021 142  136 - 145 mmol/L Final    Potassium reflex Magnesium 12/23/2021 3.5  3.5 - 5.1 mmol/L Final    Chloride 12/23/2021 104  99 - 110 mmol/L Final    CO2 12/23/2021 27  21 - 32 mmol/L Final    Anion Gap 12/23/2021 11  3 - 16 Final    Glucose 12/23/2021 128* 70 - 99 mg/dL Final    BUN 12/23/2021 20  7 - 20 mg/dL Final    CREATININE 12/23/2021 0.8  0.6 - 1.2 mg/dL Final    GFR Non- 12/23/2021 >60  >60 Final    Comment: >60 mL/min/1.73m2 EGFR, calc. for ages 25 and older using the  MDRD formula (not corrected for weight), is valid for stable  renal function.  GFR  12/23/2021 >60  >60 Final    Comment: Chronic Kidney Disease: less than 60 ml/min/1.73 sq.m. Kidney Failure: less than 15 ml/min/1.73 sq.m. Results valid for patients 18 years and older.       Calcium 12/23/2021 10.4  8.3 - 10.6 mg/dL Final    POC Glucose 12/23/2021 109* 70 - 99 mg/dl Final    Performed on 12/23/2021 ACCU-CHEK   Final    Color, UA 12/23/2021 Yellow  Straw/Yellow Final    Clarity, UA 12/23/2021 Clear  Clear Final    Glucose, Ur 12/23/2021 Negative  Negative mg/dL Final  Bilirubin Urine 12/23/2021 Negative  Negative Final    Ketones, Urine 12/23/2021 Negative  Negative mg/dL Final    Specific Gravity, UA 12/23/2021 1.010  1.005 - 1.030 Final    Blood, Urine 12/23/2021 Negative  Negative Final    pH, UA 12/23/2021 6.5  5.0 - 8.0 Final    Protein, UA 12/23/2021 Negative  Negative mg/dL Final    Urobilinogen, Urine 12/23/2021 0.2  <2.0 E.U./dL Final    Nitrite, Urine 12/23/2021 Negative  Negative Final    Leukocyte Esterase, Urine 12/23/2021 LARGE* Negative Final    Microscopic Examination 12/23/2021 YES   Final    Urine Type 12/23/2021 NotGiven   Final    Urine received in a container without preservatives.  Urine Reflex to Culture 12/23/2021 Yes   Final    Magnesium 12/23/2021 1.60* 1.80 - 2.40 mg/dL Final    Hyaline Casts, UA 12/23/2021 3-5* 0 - 2 /LPF Final    Mucus, UA 12/23/2021 3+* None Seen /LPF Final    WBC, UA 12/23/2021 * 0 - 5 /HPF Final    RBC, UA 12/23/2021 3-4  0 - 4 /HPF Final    Epithelial Cells, UA 12/23/2021 6-10* 0 - 5 /HPF Final    Bacteria, UA 12/23/2021 2+* None Seen /HPF Final    Urine Culture, Routine 12/23/2021 <50,000 CFU/ml mixed skin/urogenital timothy.  No further workup   Final    POC Glucose 12/24/2021 121* 70 - 99 mg/dl Final    Performed on 12/24/2021 ACCU-CHEK   Final    POC Glucose 12/24/2021 109* 70 - 99 mg/dl Final    Performed on 12/24/2021 ACCU-CHEK   Final    POC Glucose 12/25/2021 112* 70 - 99 mg/dl Final    Performed on 12/25/2021 ACCU-CHEK   Final    POC Glucose 12/26/2021 113* 70 - 99 mg/dl Final    Performed on 12/26/2021 ACCU-CHEK   Final            Medications  Current Facility-Administered Medications: ondansetron (ZOFRAN-ODT) disintegrating tablet 4 mg, 4 mg, Oral, Q8H PRN  OLANZapine (ZYPREXA) tablet 2.5 mg, 2.5 mg, Oral, Nightly  mirtazapine (REMERON) tablet 7.5 mg, 7.5 mg, Oral, Nightly  OLANZapine (ZYPREXA) tablet 2.5 mg, 2.5 mg, Oral, Q8H PRN **OR** OLANZapine (ZYPREXA) injection 2.5 mg, 2.5 mg, IntraMUSCular, Q8H PRN  cephALEXin (KEFLEX) capsule 500 mg, 500 mg, Oral, BID  ascorbic acid (VITAMIN C) tablet 500 mg, 500 mg, Oral, Daily  potassium chloride (KLOR-CON M) extended release tablet 20 mEq, 20 mEq, Oral, Daily with breakfast  diazePAM (VALIUM) tablet 2 mg, 2 mg, Oral, BID  acetaminophen (TYLENOL) tablet 650 mg, 650 mg, Oral, Q4H PRN  ibuprofen (ADVIL;MOTRIN) tablet 400 mg, 400 mg, Oral, Q6H PRN  magnesium hydroxide (MILK OF MAGNESIA) 400 MG/5ML suspension 30 mL, 30 mL, Oral, Daily PRN  aluminum & magnesium hydroxide-simethicone (MAALOX) 200-200-20 MG/5ML suspension 30 mL, 30 mL, Oral, Q6H PRN  sterile water injection 2.1 mL, 2.1 mL, IntraMUSCular, Q4H PRN  benztropine mesylate (COGENTIN) injection 2 mg, 2 mg, IntraMUSCular, BID PRN  nicotine polacrilex (COMMIT) lozenge 2 mg, 2 mg, Oral, Q1H PRN  traZODone (DESYREL) tablet 50 mg, 50 mg, Oral, Nightly PRN  hydrOXYzine (VISTARIL) capsule 50 mg, 50 mg, Oral, Q6H PRN  aspirin chewable tablet 81 mg, 81 mg, Oral, Daily  sertraline (ZOLOFT) tablet 150 mg, 150 mg, Oral, Nightly  dorzolamide-timolol (COSOPT) 22.3-6.8 MG/ML ophthalmic solution 1 drop, 1 drop, Ophthalmic, BID  atorvastatin (LIPITOR) tablet 20 mg, 20 mg, Oral, Nightly  metFORMIN (GLUCOPHAGE) tablet 500 mg, 500 mg, Oral, BID WC  metoprolol succinate (TOPROL XL) extended release tablet 25 mg, 25 mg, Oral, Daily  metoprolol succinate (TOPROL XL) extended release tablet 100 mg, 100 mg, Oral, Daily  amLODIPine (NORVASC) tablet 5 mg, 5 mg, Oral, Daily  latanoprost (XALATAN) 0.005 % ophthalmic solution 1 drop, 1 drop, Both Eyes, Nightly  losartan (COZAAR) tablet 100 mg, 100 mg, Oral, Daily **AND** hydroCHLOROthiazide (HYDRODIURIL) tablet 25 mg, 25 mg, Oral, Daily    ASSESSMENT AND PLAN    Principal Problem:    Generalized anxiety disorder  Active Problems:    Type 2 diabetes mellitus without complication, without long-term current use of insulin (HCC)    Major depression, single episode    Hypokalemia

## 2021-12-27 NOTE — BH NOTE
This clinician went to see if Julianna Him would like to attended group and she said she did not wish to at this time.     ROBERT Frederick

## 2021-12-27 NOTE — PLAN OF CARE
Problem: Altered Mood, Depressive Behavior:  Goal: Able to verbalize and/or display a decrease in depressive symptoms  Description: Able to verbalize and/or display a decrease in depressive symptoms  Outcome: Ongoing   Caprice Linder  presents with depressed mood and flat affect. She spent most of the day sitting at her desk in her room with the door closed. Caprice Linder did attend one of the daytime groups. She ate her lunch in the day room and then went back to her desk. Caprice Linder did brighten when her  came to visit. They had a good visit. Caprice Linder denied suicidal and homicidal ideations today. She denied hallucinations.

## 2021-12-27 NOTE — PLAN OF CARE
Problem: Altered Mood, Depressive Behavior:  Goal: Able to verbalize acceptance of life and situations over which he or she has no control  Description: Able to verbalize acceptance of life and situations over which he or she has no control  Outcome: Ongoing     Problem: Altered Mood, Depressive Behavior:  Goal: Able to verbalize and/or display a decrease in depressive symptoms  Description: Able to verbalize and/or display a decrease in depressive symptoms  Outcome: Ongoing    Pt has been isolative to her room. Encouraged pt to come out in the day room but she refused. She was very anxious at the beginning of the shift and unable to talk properly. She asked staff to help her use the bathroom. Writer assisted pt to use breathing techniques to try and relax herself. She was medication compliant. She refused snacks. She is currently resting in her room.  Denies needs

## 2021-12-27 NOTE — FLOWSHEET NOTE
Senior Purposeful Rounding     Position:Repositions Self     Physical Environment:Room free from clutter, Clear path to bathroom , Adequate lighting and No safety hazards noted     Pain Rating/ Nonverbal Pain Behaviors:0; denies     Pain interventions Attempted: n/a     Patient Toileted:Independent

## 2021-12-27 NOTE — GROUP NOTE
Group Therapy Note    Date: 12/27/2021    Group Start Time: 1100  Group End Time: 5138  Group Topic: Cognitive Skills    [address], [facility]        Group Therapy Note    Attendees: 4    Participants engaged in reminiscing therapy. Notes:  Abhilash Villegas participated in this clinician's group for the first time and appears to be making progress.     Status After Intervention:  Improved    Participation Level: Interactive    Participation Quality: Appropriate and Sharing      Speech:  normal      Thought Process/Content: Logical      Affective Functioning: Congruent      Mood: anxious      Level of consciousness:  Alert and Attentive      Response to Learning: Progressing to goal      Endings: None Reported    Modes of Intervention: Exploration      Discipline Responsible: /Counselor      Signature:  ROBERT Patel

## 2021-12-27 NOTE — PROGRESS NOTES
Department of Psychiatry  AttendingProgress Note  Chief Complaint: \"I'm good\"    Patient's chart was reviewed and collaborated with  about the treatment plan. SUBJECTIVE:  Pt up, eating in the common area this afternoon. Pt asked how she was feeling \"I'm pretty good\". Pt eating and smiling. Pt asked how her Ernesto was and a states \"It was really good\". Pt also asked for another shower this afternoon. Pt's mood has improved, staff able to get her to come out for meals, talking with other patients, and personal care has improved as well. No changes to medications at this time. Pt's  to visit today. Possible discharge this week. Patient is feeling better. Suicidal ideation:  denies suicidal ideation. Patient does not have medication side effects. ROS: Patient has new complaints: no  Sleeping adequately:  Yes   Appetite adequate: Yes  Attending groups: No:   Visitors:Yes    OBJECTIVE    Physical  VITALS:  BP (!) 141/80   Pulse 77   Temp 97.8 °F (36.6 °C) (Temporal)   Resp 16   Ht 5' (1.524 m)   Wt 180 lb 6.4 oz (81.8 kg)   LMP  (LMP Unknown)   SpO2 98%   BMI 35.23 kg/m²     Mental Status Examination:  Patients appearance was well-appearing, street clothes and in chair. Thoughts are Logical. Homicidal ideations none. No abnormal movements, tics or mannerisms. Memory intact Aims 0. Concentration Good. Alert and oriented X 4. Insight and Judgement has improved. Patient was cooperative.  Patient gait normal. Mood euthymic, affect normal affect Hallucinations Absent, suicidal ideations no specific plan to harm self Speech normal pitch and normal volume    Data  Labs:   Admission on 12/16/2021   Component Date Value Ref Range Status    POC Glucose 12/17/2021 168* 70 - 99 mg/dl Final    Performed on 12/17/2021 ACCU-CHEK   Final    POC Glucose 12/17/2021 141* 70 - 99 mg/dl Final    Performed on 12/17/2021 ACCU-CHEK   Final    Potassium 12/18/2021 2.9* 3.5 - 5.1 mmol/L Final    Magnesium 12/18/2021 1.90  1.80 - 2.40 mg/dL Final    Potassium 12/18/2021 4.0  3.5 - 5.1 mmol/L Final    POC Glucose 12/18/2021 122* 70 - 99 mg/dl Final    Performed on 12/18/2021 ACCU-CHEK   Final    POC Glucose 12/18/2021 116* 70 - 99 mg/dl Final    Performed on 12/18/2021 ACCU-CHEK   Final    Sodium 12/19/2021 141  136 - 145 mmol/L Final    Potassium reflex Magnesium 12/19/2021 3.4* 3.5 - 5.1 mmol/L Final    Chloride 12/19/2021 104  99 - 110 mmol/L Final    CO2 12/19/2021 26  21 - 32 mmol/L Final    Anion Gap 12/19/2021 11  3 - 16 Final    Glucose 12/19/2021 107* 70 - 99 mg/dL Final    BUN 12/19/2021 21* 7 - 20 mg/dL Final    CREATININE 12/19/2021 1.0  0.6 - 1.2 mg/dL Final    GFR Non- 12/19/2021 54* >60 Final    Comment: >60 mL/min/1.73m2 EGFR, calc. for ages 25 and older using the  MDRD formula (not corrected for weight), is valid for stable  renal function.  GFR  12/19/2021 >60  >60 Final    Comment: Chronic Kidney Disease: less than 60 ml/min/1.73 sq.m. Kidney Failure: less than 15 ml/min/1.73 sq.m. Results valid for patients 18 years and older.       Calcium 12/19/2021 10.0  8.3 - 10.6 mg/dL Final    POC Glucose 12/19/2021 103* 70 - 99 mg/dl Final    Performed on 12/19/2021 ACCU-CHEK   Final    Magnesium 12/19/2021 1.90  1.80 - 2.40 mg/dL Final    POC Glucose 12/19/2021 130* 70 - 99 mg/dl Final    Performed on 12/19/2021 ACCU-CHEK   Final    POC Glucose 12/20/2021 125* 70 - 99 mg/dl Final    Performed on 12/20/2021 ACCU-CHEK   Final    POC Glucose 12/20/2021 115* 70 - 99 mg/dl Final    Performed on 12/20/2021 ACCU-CHEK   Final    POC Glucose 12/20/2021 129* 70 - 99 mg/dl Final    Performed on 12/20/2021 ACCU-CHEK   Final    Sodium 12/21/2021 142  136 - 145 mmol/L Final    Potassium reflex Magnesium 12/21/2021 3.2* 3.5 - 5.1 mmol/L Final    Chloride 12/21/2021 102  99 - 110 mmol/L Final    CO2 12/21/2021 30  21 - 32 mmol/L Final    Anion Gap 12/21/2021 10  3 - 16 Final    Glucose 12/21/2021 108* 70 - 99 mg/dL Final    BUN 12/21/2021 16  7 - 20 mg/dL Final    CREATININE 12/21/2021 0.9  0.6 - 1.2 mg/dL Final    GFR Non- 12/21/2021 >60  >60 Final    Comment: >60 mL/min/1.73m2 EGFR, calc. for ages 25 and older using the  MDRD formula (not corrected for weight), is valid for stable  renal function.  GFR  12/21/2021 >60  >60 Final    Comment: Chronic Kidney Disease: less than 60 ml/min/1.73 sq.m. Kidney Failure: less than 15 ml/min/1.73 sq.m. Results valid for patients 18 years and older.  Calcium 12/21/2021 10.6  8.3 - 10.6 mg/dL Final    POC Glucose 12/21/2021 99  70 - 99 mg/dl Final    Performed on 12/21/2021 ACCU-CHEK   Final    Magnesium 12/21/2021 1.90  1.80 - 2.40 mg/dL Final    POC Glucose 12/21/2021 126* 70 - 99 mg/dl Final    Performed on 12/21/2021 ACCU-CHEK   Final    POC Glucose 12/22/2021 108* 70 - 99 mg/dl Final    Performed on 12/22/2021 ACCU-CHEK   Final    POC Glucose 12/22/2021 107* 70 - 99 mg/dl Final    Performed on 12/22/2021 ACCU-CHEK   Final    Sodium 12/23/2021 142  136 - 145 mmol/L Final    Potassium reflex Magnesium 12/23/2021 3.5  3.5 - 5.1 mmol/L Final    Chloride 12/23/2021 104  99 - 110 mmol/L Final    CO2 12/23/2021 27  21 - 32 mmol/L Final    Anion Gap 12/23/2021 11  3 - 16 Final    Glucose 12/23/2021 128* 70 - 99 mg/dL Final    BUN 12/23/2021 20  7 - 20 mg/dL Final    CREATININE 12/23/2021 0.8  0.6 - 1.2 mg/dL Final    GFR Non- 12/23/2021 >60  >60 Final    Comment: >60 mL/min/1.73m2 EGFR, calc. for ages 25 and older using the  MDRD formula (not corrected for weight), is valid for stable  renal function.  GFR  12/23/2021 >60  >60 Final    Comment: Chronic Kidney Disease: less than 60 ml/min/1.73 sq.m. Kidney Failure: less than 15 ml/min/1.73 sq.m.   Results valid for patients 18 years and older.      Calcium 12/23/2021 10.4  8.3 - 10.6 mg/dL Final    POC Glucose 12/23/2021 109* 70 - 99 mg/dl Final    Performed on 12/23/2021 ACCU-CHEK   Final    Color, UA 12/23/2021 Yellow  Straw/Yellow Final    Clarity, UA 12/23/2021 Clear  Clear Final    Glucose, Ur 12/23/2021 Negative  Negative mg/dL Final    Bilirubin Urine 12/23/2021 Negative  Negative Final    Ketones, Urine 12/23/2021 Negative  Negative mg/dL Final    Specific Gravity, UA 12/23/2021 1.010  1.005 - 1.030 Final    Blood, Urine 12/23/2021 Negative  Negative Final    pH, UA 12/23/2021 6.5  5.0 - 8.0 Final    Protein, UA 12/23/2021 Negative  Negative mg/dL Final    Urobilinogen, Urine 12/23/2021 0.2  <2.0 E.U./dL Final    Nitrite, Urine 12/23/2021 Negative  Negative Final    Leukocyte Esterase, Urine 12/23/2021 LARGE* Negative Final    Microscopic Examination 12/23/2021 YES   Final    Urine Type 12/23/2021 NotGiven   Final    Urine received in a container without preservatives.  Urine Reflex to Culture 12/23/2021 Yes   Final    Magnesium 12/23/2021 1.60* 1.80 - 2.40 mg/dL Final    Hyaline Casts, UA 12/23/2021 3-5* 0 - 2 /LPF Final    Mucus, UA 12/23/2021 3+* None Seen /LPF Final    WBC, UA 12/23/2021 * 0 - 5 /HPF Final    RBC, UA 12/23/2021 3-4  0 - 4 /HPF Final    Epithelial Cells, UA 12/23/2021 6-10* 0 - 5 /HPF Final    Bacteria, UA 12/23/2021 2+* None Seen /HPF Final    Urine Culture, Routine 12/23/2021 <50,000 CFU/ml mixed skin/urogenital timothy.  No further workup   Final    POC Glucose 12/24/2021 121* 70 - 99 mg/dl Final    Performed on 12/24/2021 ACCU-CHEK   Final    POC Glucose 12/24/2021 109* 70 - 99 mg/dl Final    Performed on 12/24/2021 ACCU-CHEK   Final    POC Glucose 12/25/2021 112* 70 - 99 mg/dl Final    Performed on 12/25/2021 ACCU-CHEK   Final    POC Glucose 12/26/2021 113* 70 - 99 mg/dl Final    Performed on 12/26/2021 ACCU-CHEK   Final    POC Glucose 12/26/2021 88  70 - 99 mg/dl Final    Performed on 12/26/2021 ACCU-CHEK   Final            Medications  Current Facility-Administered Medications: ondansetron (ZOFRAN-ODT) disintegrating tablet 4 mg, 4 mg, Oral, Q8H PRN  OLANZapine (ZYPREXA) tablet 2.5 mg, 2.5 mg, Oral, Nightly  mirtazapine (REMERON) tablet 7.5 mg, 7.5 mg, Oral, Nightly  OLANZapine (ZYPREXA) tablet 2.5 mg, 2.5 mg, Oral, Q8H PRN **OR** OLANZapine (ZYPREXA) injection 2.5 mg, 2.5 mg, IntraMUSCular, Q8H PRN  cephALEXin (KEFLEX) capsule 500 mg, 500 mg, Oral, BID  ascorbic acid (VITAMIN C) tablet 500 mg, 500 mg, Oral, Daily  potassium chloride (KLOR-CON M) extended release tablet 20 mEq, 20 mEq, Oral, Daily with breakfast  diazePAM (VALIUM) tablet 2 mg, 2 mg, Oral, BID  acetaminophen (TYLENOL) tablet 650 mg, 650 mg, Oral, Q4H PRN  ibuprofen (ADVIL;MOTRIN) tablet 400 mg, 400 mg, Oral, Q6H PRN  magnesium hydroxide (MILK OF MAGNESIA) 400 MG/5ML suspension 30 mL, 30 mL, Oral, Daily PRN  aluminum & magnesium hydroxide-simethicone (MAALOX) 200-200-20 MG/5ML suspension 30 mL, 30 mL, Oral, Q6H PRN  sterile water injection 2.1 mL, 2.1 mL, IntraMUSCular, Q4H PRN  benztropine mesylate (COGENTIN) injection 2 mg, 2 mg, IntraMUSCular, BID PRN  nicotine polacrilex (COMMIT) lozenge 2 mg, 2 mg, Oral, Q1H PRN  traZODone (DESYREL) tablet 50 mg, 50 mg, Oral, Nightly PRN  hydrOXYzine (VISTARIL) capsule 50 mg, 50 mg, Oral, Q6H PRN  aspirin chewable tablet 81 mg, 81 mg, Oral, Daily  sertraline (ZOLOFT) tablet 150 mg, 150 mg, Oral, Nightly  dorzolamide-timolol (COSOPT) 22.3-6.8 MG/ML ophthalmic solution 1 drop, 1 drop, Ophthalmic, BID  atorvastatin (LIPITOR) tablet 20 mg, 20 mg, Oral, Nightly  metFORMIN (GLUCOPHAGE) tablet 500 mg, 500 mg, Oral, BID WC  metoprolol succinate (TOPROL XL) extended release tablet 25 mg, 25 mg, Oral, Daily  metoprolol succinate (TOPROL XL) extended release tablet 100 mg, 100 mg, Oral, Daily  amLODIPine (NORVASC) tablet 5 mg, 5 mg, Oral, Daily  latanoprost (XALATAN) 0.005 % ophthalmic solution 1 drop, 1 drop, Both Eyes, Nightly  losartan (COZAAR) tablet 100 mg, 100 mg, Oral, Daily **AND** hydroCHLOROthiazide (HYDRODIURIL) tablet 25 mg, 25 mg, Oral, Daily    ASSESSMENT AND PLAN    Principal Problem:    Generalized anxiety disorder  Active Problems:    Type 2 diabetes mellitus without complication, without long-term current use of insulin (HCC)    Major depression, single episode    Hypokalemia    Hypomagnesemia    Asymptomatic bacteriuria  Resolved Problems:    * No resolved hospital problems. *       1. Patient's symptoms   are improving  2. Probable discharge is this week  3. Discharge planning is incomplete  4. Suicidal ideation is unchanged, none  5. Total time with patient was 40 minutes and more than 50 % of that time was spent counseling the patient on their symptoms, treatment and expected goals.      Devang Grijalva - [facility] Hospital[facility]ADISLAV

## 2021-12-27 NOTE — BH NOTE
Purposeful Rounding    Patient concerns reported:  Anxiety    Nurse made aware: Yes    Patient Turned and repositioned: Independent    Patient Toileted: Independent    Fall Precautions in Place:  Yellow non-skid socks on, Bed locked in low position, 1/2 bed rails up, Lighting appropriate, Room free of clutter and Clear path to bathroom      Electronically signed by Dat Wolff on 12/26/21 at 8:57 PM EST

## 2021-12-28 LAB
GLUCOSE BLD-MCNC: 182 MG/DL (ref 70–99)
PERFORMED ON: ABNORMAL

## 2021-12-28 PROCEDURE — 1240000000 HC EMOTIONAL WELLNESS R&B

## 2021-12-28 PROCEDURE — 6370000000 HC RX 637 (ALT 250 FOR IP)

## 2021-12-28 PROCEDURE — 6370000000 HC RX 637 (ALT 250 FOR IP): Performed by: PSYCHIATRY & NEUROLOGY

## 2021-12-28 PROCEDURE — 99233 SBSQ HOSP IP/OBS HIGH 50: CPT

## 2021-12-28 RX ADMIN — METOPROLOL SUCCINATE 100 MG: 50 TABLET, EXTENDED RELEASE ORAL at 08:40

## 2021-12-28 RX ADMIN — OLANZAPINE 2.5 MG: 5 TABLET, FILM COATED ORAL at 20:35

## 2021-12-28 RX ADMIN — ATORVASTATIN CALCIUM 20 MG: 10 TABLET, FILM COATED ORAL at 20:35

## 2021-12-28 RX ADMIN — METOPROLOL SUCCINATE 25 MG: 25 TABLET, EXTENDED RELEASE ORAL at 08:41

## 2021-12-28 RX ADMIN — ASPIRIN 81 MG: 81 TABLET, CHEWABLE ORAL at 08:41

## 2021-12-28 RX ADMIN — METFORMIN HYDROCHLORIDE 500 MG: 500 TABLET ORAL at 17:22

## 2021-12-28 RX ADMIN — SERTRALINE 150 MG: 100 TABLET, FILM COATED ORAL at 20:35

## 2021-12-28 RX ADMIN — DIAZEPAM 2 MG: 2 TABLET ORAL at 20:34

## 2021-12-28 RX ADMIN — MIRTAZAPINE 7.5 MG: 15 TABLET, FILM COATED ORAL at 20:35

## 2021-12-28 RX ADMIN — LOSARTAN POTASSIUM 100 MG: 100 TABLET, FILM COATED ORAL at 08:41

## 2021-12-28 RX ADMIN — DORZOLAMIDE HYDROCHLORIDE AND TIMOLOL MALEATE 1 DROP: 20; 5 SOLUTION/ DROPS OPHTHALMIC at 20:35

## 2021-12-28 RX ADMIN — OXYCODONE HYDROCHLORIDE AND ACETAMINOPHEN 500 MG: 500 TABLET ORAL at 08:42

## 2021-12-28 RX ADMIN — HYDROCHLOROTHIAZIDE 25 MG: 25 TABLET ORAL at 08:42

## 2021-12-28 RX ADMIN — AMLODIPINE BESYLATE 5 MG: 5 TABLET ORAL at 08:42

## 2021-12-28 RX ADMIN — HYDROXYZINE PAMOATE 50 MG: 50 CAPSULE ORAL at 17:22

## 2021-12-28 RX ADMIN — DORZOLAMIDE HYDROCHLORIDE AND TIMOLOL MALEATE 1 DROP: 20; 5 SOLUTION/ DROPS OPHTHALMIC at 08:40

## 2021-12-28 RX ADMIN — METFORMIN HYDROCHLORIDE 500 MG: 500 TABLET ORAL at 08:42

## 2021-12-28 RX ADMIN — POTASSIUM CHLORIDE 20 MEQ: 1500 TABLET, EXTENDED RELEASE ORAL at 08:42

## 2021-12-28 RX ADMIN — DIAZEPAM 2 MG: 2 TABLET ORAL at 08:42

## 2021-12-28 RX ADMIN — LATANOPROST 1 DROP: 50 SOLUTION OPHTHALMIC at 20:35

## 2021-12-28 ASSESSMENT — PAIN SCALES - GENERAL: PAINLEVEL_OUTOF10: 0

## 2021-12-28 NOTE — FLOWSHEET NOTE
Senior Purposeful Rounding     Position:Repositions Self     Physical Environment:Room free from clutter, Clear path to bathroom , Adequate lighting and No safety hazards noted     Pain Rating/ Nonverbal Pain Behaviors:0; none     Pain interventions Attempted: NA     Patient Toileted:Independent

## 2021-12-28 NOTE — PROGRESS NOTES
Department of Psychiatry  AttendingProgress Note  Chief Complaint: Anxiety/Depression    Patient's chart was reviewed and collaborated with  about the treatment plan. SUBJECTIVE:    Pt lying in bed today. Pt states she is tired today, wanted to take a nap. Pt asked how she is feeling, stating \"I guess I'm ok\". Pt's speech was clear and mood was euthymic as we spoke. Pt asked about she would feel about going home. Pt did express concern about being ready to leave. Pt does not want her  to have to take on too much by himself. Pt also expressed concern that she would be ready for home. We were able to talk about all the things that she has been doing here, self care etc. Pt states that she thinks she is able to clean herself, walk to the bathroom, etc.  Pt became anxious as we talked about the the change. Pt assured that she would not leave until it was safe and things at home were ready. I will speak with social work about what we would need to do to ensure her home environment is prepared. Patient is feeling better. Suicidal ideation:  denies suicidal ideation. Patient does not have medication side effects. ROS: Patient has new complaints: no  Sleeping adequately:  Yes   Appetite adequate: Yes  Attending groups: No:   Visitors:Yes    OBJECTIVE    Physical  VITALS:  /83   Pulse 76   Temp 97.5 °F (36.4 °C) (Temporal)   Resp 16   Ht 5' (1.524 m)   Wt 180 lb 6.4 oz (81.8 kg)   LMP  (LMP Unknown)   SpO2 98%   BMI 35.23 kg/m²     Mental Status Examination:  Patients appearance was well-appearing, street clothes, lying in bed and good grooming. Thoughts are anxious in regards to change. Homicidal ideations none. No abnormal movements, tics or mannerisms. Memory intact Aims 0. Concentration Fair. Alert and oriented X 4. Insight and Judgement impaired insight. Patient was cooperative.  Patient gait normal. Mood anxious, affect anxiety, although improved Hallucinations Operative Note





- Note:


Operative Date: 04/04/17 (cecilio)


Pre-Operative Diagnosis: left hip djd


Operation: left cesar thr


Post-Operative Diagnosis: Same as Pre-op


Surgeon: Sarthak Melendez


Assistant: Mati Jain


Anesthesiologist/CRNA: Hector Rader


Anesthesia: Spinal, Local


Specimens Removed: bone fragments


Estimated Blood Loss (mls): 200


Operative Report Dictated: Yes Absent, suicidal ideations no specific plan to harm self Speech normal pitch and soft    Data  Labs:   Admission on 12/16/2021   Component Date Value Ref Range Status    POC Glucose 12/17/2021 168* 70 - 99 mg/dl Final    Performed on 12/17/2021 ACCU-CHEK   Final    POC Glucose 12/17/2021 141* 70 - 99 mg/dl Final    Performed on 12/17/2021 ACCU-CHEK   Final    Potassium 12/18/2021 2.9* 3.5 - 5.1 mmol/L Final    Magnesium 12/18/2021 1.90  1.80 - 2.40 mg/dL Final    Potassium 12/18/2021 4.0  3.5 - 5.1 mmol/L Final    POC Glucose 12/18/2021 122* 70 - 99 mg/dl Final    Performed on 12/18/2021 ACCU-CHEK   Final    POC Glucose 12/18/2021 116* 70 - 99 mg/dl Final    Performed on 12/18/2021 ACCU-CHEK   Final    Sodium 12/19/2021 141  136 - 145 mmol/L Final    Potassium reflex Magnesium 12/19/2021 3.4* 3.5 - 5.1 mmol/L Final    Chloride 12/19/2021 104  99 - 110 mmol/L Final    CO2 12/19/2021 26  21 - 32 mmol/L Final    Anion Gap 12/19/2021 11  3 - 16 Final    Glucose 12/19/2021 107* 70 - 99 mg/dL Final    BUN 12/19/2021 21* 7 - 20 mg/dL Final    CREATININE 12/19/2021 1.0  0.6 - 1.2 mg/dL Final    GFR Non- 12/19/2021 54* >60 Final    Comment: >60 mL/min/1.73m2 EGFR, calc. for ages 25 and older using the  MDRD formula (not corrected for weight), is valid for stable  renal function.  GFR  12/19/2021 >60  >60 Final    Comment: Chronic Kidney Disease: less than 60 ml/min/1.73 sq.m. Kidney Failure: less than 15 ml/min/1.73 sq.m. Results valid for patients 18 years and older.       Calcium 12/19/2021 10.0  8.3 - 10.6 mg/dL Final    POC Glucose 12/19/2021 103* 70 - 99 mg/dl Final    Performed on 12/19/2021 ACCU-CHEK   Final    Magnesium 12/19/2021 1.90  1.80 - 2.40 mg/dL Final    POC Glucose 12/19/2021 130* 70 - 99 mg/dl Final    Performed on 12/19/2021 ACCU-CHEK   Final    POC Glucose 12/20/2021 125* 70 - 99 mg/dl Final    Performed on 12/20/2021 ACCU-CHEK Final    POC Glucose 12/20/2021 115* 70 - 99 mg/dl Final    Performed on 12/20/2021 ACCU-CHEK   Final    POC Glucose 12/20/2021 129* 70 - 99 mg/dl Final    Performed on 12/20/2021 ACCU-CHEK   Final    Sodium 12/21/2021 142  136 - 145 mmol/L Final    Potassium reflex Magnesium 12/21/2021 3.2* 3.5 - 5.1 mmol/L Final    Chloride 12/21/2021 102  99 - 110 mmol/L Final    CO2 12/21/2021 30  21 - 32 mmol/L Final    Anion Gap 12/21/2021 10  3 - 16 Final    Glucose 12/21/2021 108* 70 - 99 mg/dL Final    BUN 12/21/2021 16  7 - 20 mg/dL Final    CREATININE 12/21/2021 0.9  0.6 - 1.2 mg/dL Final    GFR Non- 12/21/2021 >60  >60 Final    Comment: >60 mL/min/1.73m2 EGFR, calc. for ages 25 and older using the  MDRD formula (not corrected for weight), is valid for stable  renal function.  GFR  12/21/2021 >60  >60 Final    Comment: Chronic Kidney Disease: less than 60 ml/min/1.73 sq.m. Kidney Failure: less than 15 ml/min/1.73 sq.m. Results valid for patients 18 years and older.       Calcium 12/21/2021 10.6  8.3 - 10.6 mg/dL Final    POC Glucose 12/21/2021 99  70 - 99 mg/dl Final    Performed on 12/21/2021 ACCU-CHEK   Final    Magnesium 12/21/2021 1.90  1.80 - 2.40 mg/dL Final    POC Glucose 12/21/2021 126* 70 - 99 mg/dl Final    Performed on 12/21/2021 ACCU-CHEK   Final    POC Glucose 12/22/2021 108* 70 - 99 mg/dl Final    Performed on 12/22/2021 ACCU-CHEK   Final    POC Glucose 12/22/2021 107* 70 - 99 mg/dl Final    Performed on 12/22/2021 ACCU-CHEK   Final    Sodium 12/23/2021 142  136 - 145 mmol/L Final    Potassium reflex Magnesium 12/23/2021 3.5  3.5 - 5.1 mmol/L Final    Chloride 12/23/2021 104  99 - 110 mmol/L Final    CO2 12/23/2021 27  21 - 32 mmol/L Final    Anion Gap 12/23/2021 11  3 - 16 Final    Glucose 12/23/2021 128* 70 - 99 mg/dL Final    BUN 12/23/2021 20  7 - 20 mg/dL Final    CREATININE 12/23/2021 0.8  0.6 - 1.2 mg/dL Final    GFR Non- 12/23/2021 >60  >60 Final    Comment: >60 mL/min/1.73m2 EGFR, calc. for ages 25 and older using the  MDRD formula (not corrected for weight), is valid for stable  renal function.  GFR  12/23/2021 >60  >60 Final    Comment: Chronic Kidney Disease: less than 60 ml/min/1.73 sq.m. Kidney Failure: less than 15 ml/min/1.73 sq.m. Results valid for patients 18 years and older.  Calcium 12/23/2021 10.4  8.3 - 10.6 mg/dL Final    POC Glucose 12/23/2021 109* 70 - 99 mg/dl Final    Performed on 12/23/2021 ACCU-CHEK   Final    Color, UA 12/23/2021 Yellow  Straw/Yellow Final    Clarity, UA 12/23/2021 Clear  Clear Final    Glucose, Ur 12/23/2021 Negative  Negative mg/dL Final    Bilirubin Urine 12/23/2021 Negative  Negative Final    Ketones, Urine 12/23/2021 Negative  Negative mg/dL Final    Specific Gravity, UA 12/23/2021 1.010  1.005 - 1.030 Final    Blood, Urine 12/23/2021 Negative  Negative Final    pH, UA 12/23/2021 6.5  5.0 - 8.0 Final    Protein, UA 12/23/2021 Negative  Negative mg/dL Final    Urobilinogen, Urine 12/23/2021 0.2  <2.0 E.U./dL Final    Nitrite, Urine 12/23/2021 Negative  Negative Final    Leukocyte Esterase, Urine 12/23/2021 LARGE* Negative Final    Microscopic Examination 12/23/2021 YES   Final    Urine Type 12/23/2021 NotGiven   Final    Urine received in a container without preservatives.  Urine Reflex to Culture 12/23/2021 Yes   Final    Magnesium 12/23/2021 1.60* 1.80 - 2.40 mg/dL Final    Hyaline Casts, UA 12/23/2021 3-5* 0 - 2 /LPF Final    Mucus, UA 12/23/2021 3+* None Seen /LPF Final    WBC, UA 12/23/2021 * 0 - 5 /HPF Final    RBC, UA 12/23/2021 3-4  0 - 4 /HPF Final    Epithelial Cells, UA 12/23/2021 6-10* 0 - 5 /HPF Final    Bacteria, UA 12/23/2021 2+* None Seen /HPF Final    Urine Culture, Routine 12/23/2021 <50,000 CFU/ml mixed skin/urogenital timothy.  No further workup   Final    POC Glucose 12/24/2021 121* 70 - 99 mg/dl Final    Performed on 12/24/2021 ACCU-CHEK   Final    POC Glucose 12/24/2021 109* 70 - 99 mg/dl Final    Performed on 12/24/2021 ACCU-CHEK   Final    POC Glucose 12/25/2021 112* 70 - 99 mg/dl Final    Performed on 12/25/2021 ACCU-CHEK   Final    POC Glucose 12/26/2021 113* 70 - 99 mg/dl Final    Performed on 12/26/2021 ACCU-CHEK   Final    POC Glucose 12/26/2021 88  70 - 99 mg/dl Final    Performed on 12/26/2021 ACCU-CHEK   Final    POC Glucose 12/27/2021 124* 70 - 99 mg/dl Final    Performed on 12/27/2021 ACCU-CHEK   Final            Medications  Current Facility-Administered Medications: ondansetron (ZOFRAN-ODT) disintegrating tablet 4 mg, 4 mg, Oral, Q8H PRN  OLANZapine (ZYPREXA) tablet 2.5 mg, 2.5 mg, Oral, Nightly  mirtazapine (REMERON) tablet 7.5 mg, 7.5 mg, Oral, Nightly  OLANZapine (ZYPREXA) tablet 2.5 mg, 2.5 mg, Oral, Q8H PRN **OR** OLANZapine (ZYPREXA) injection 2.5 mg, 2.5 mg, IntraMUSCular, Q8H PRN  ascorbic acid (VITAMIN C) tablet 500 mg, 500 mg, Oral, Daily  potassium chloride (KLOR-CON M) extended release tablet 20 mEq, 20 mEq, Oral, Daily with breakfast  diazePAM (VALIUM) tablet 2 mg, 2 mg, Oral, BID  acetaminophen (TYLENOL) tablet 650 mg, 650 mg, Oral, Q4H PRN  ibuprofen (ADVIL;MOTRIN) tablet 400 mg, 400 mg, Oral, Q6H PRN  magnesium hydroxide (MILK OF MAGNESIA) 400 MG/5ML suspension 30 mL, 30 mL, Oral, Daily PRN  aluminum & magnesium hydroxide-simethicone (MAALOX) 200-200-20 MG/5ML suspension 30 mL, 30 mL, Oral, Q6H PRN  sterile water injection 2.1 mL, 2.1 mL, IntraMUSCular, Q4H PRN  benztropine mesylate (COGENTIN) injection 2 mg, 2 mg, IntraMUSCular, BID PRN  nicotine polacrilex (COMMIT) lozenge 2 mg, 2 mg, Oral, Q1H PRN  traZODone (DESYREL) tablet 50 mg, 50 mg, Oral, Nightly PRN  hydrOXYzine (VISTARIL) capsule 50 mg, 50 mg, Oral, Q6H PRN  aspirin chewable tablet 81 mg, 81 mg, Oral, Daily  sertraline (ZOLOFT) tablet 150 mg, 150 mg, Oral, Nightly  dorzolamide-timolol (COSOPT) 22.3-6.8 MG/ML ophthalmic solution 1 drop, 1 drop, Ophthalmic, BID  atorvastatin (LIPITOR) tablet 20 mg, 20 mg, Oral, Nightly  metFORMIN (GLUCOPHAGE) tablet 500 mg, 500 mg, Oral, BID WC  metoprolol succinate (TOPROL XL) extended release tablet 25 mg, 25 mg, Oral, Daily  metoprolol succinate (TOPROL XL) extended release tablet 100 mg, 100 mg, Oral, Daily  amLODIPine (NORVASC) tablet 5 mg, 5 mg, Oral, Daily  latanoprost (XALATAN) 0.005 % ophthalmic solution 1 drop, 1 drop, Both Eyes, Nightly  losartan (COZAAR) tablet 100 mg, 100 mg, Oral, Daily **AND** hydroCHLOROthiazide (HYDRODIURIL) tablet 25 mg, 25 mg, Oral, Daily    ASSESSMENT AND PLAN    Principal Problem:    Generalized anxiety disorder  Active Problems:    Type 2 diabetes mellitus without complication, without long-term current use of insulin (HCC)    Major depression, single episode    Hypokalemia    Hypomagnesemia    Asymptomatic bacteriuria  Resolved Problems:    * No resolved hospital problems. *       1. Patient's symptoms   are improving  2. Probable discharge is this week  3. Discharge planning is incomplete  4. Suicidal ideation is unchanged, not present  5. Total time with patient was 40 minutes and more than 50 % of that time was spent counseling the patient on their symptoms, treatment and expected goals.      Carolann Cristina [facility] Center[facility]ADISLAV

## 2021-12-28 NOTE — FLOWSHEET NOTE
Senior Purposeful Rounding    Position:Repositions Self    Physical Environment:Room free from clutter, Clear path to bathroom , Adequate lighting and No safety hazards noted    Pain Rating/ Nonverbal Pain Behaviors:denies; none    Pain interventions Attempted: NA    Patient Toileted:Continent and Independent

## 2021-12-28 NOTE — PROGRESS NOTES
Pt and writer had planned for pt to take a shower this evening. When writer went to pt's room when it was time, pt reported that she had been standing up in her room getting herself worked up about getting a shower for the past hour. Pt was hyperventilating, unable to answer questions d/t stuttering and anxiety. Pt agreed to take vistaril at this time to help calm down for shower later this evening. Vistaril given @ 61 54 78 for anxiety. Will monitor for effectiveness.

## 2021-12-28 NOTE — FLOWSHEET NOTE
Senior Purposeful Rounding    Position:Sitting    Physical Environment:Room free from clutter, Clear path to bathroom , Adequate lighting and No safety hazards noted    Pain Rating/ Nonverbal Pain Behaviors:denies; none    Pain interventions Attempted: None    Patient Toileted:Continent and Independent

## 2021-12-28 NOTE — BH NOTE
Left message at [facility] requesting a return call to schedule follow-up appointment with Dr. Cesar Guzman. Contacted PCP and scheduled follow-up appointment.

## 2021-12-28 NOTE — PLAN OF CARE
Patient isolative to room. Patient seems to have trouble finding words until PM medication administered. Patient denies SI/HI, AVH and pain. Patient medication compliant. Patient remains free of falls at this time and no self injurious behaviors. Patient accu-check 124.

## 2021-12-28 NOTE — PLAN OF CARE
Problem: Altered Mood, Depressive Behavior:  Goal: Able to verbalize acceptance of life and situations over which he or she has no control  Description: Able to verbalize acceptance of life and situations over which he or she has no control  12/28/2021 0911 by Marta Shore RN  Outcome: Ongoing  12/27/2021 2134 by Adam Daniel RN  Outcome: Ongoing  Goal: Able to verbalize and/or display a decrease in depressive symptoms  Description: Able to verbalize and/or display a decrease in depressive symptoms  12/28/2021 0911 by Marta Shore RN  Outcome: Ongoing  12/27/2021 2134 by Adam Daniel RN  Outcome: Ongoing  Goal: Ability to disclose and discuss suicidal ideas will improve  Description: Ability to disclose and discuss suicidal ideas will improve  12/28/2021 0911 by Marta Shore RN  Outcome: Ongoing  12/27/2021 2134 by Adam Daniel RN  Outcome: Ongoing  Goal: Able to verbalize support systems  Description: Able to verbalize support systems  12/28/2021 0911 by Marta Shore RN  Outcome: Ongoing  12/27/2021 2134 by Adam Daniel RN  Outcome: Ongoing    Pt is alert and oriented x3-4. Continues to report feeling anxious and depressed. Still stuttering and having difficulties answering questions at times but appears to be able to have normal conversations when her family is visiting. Has been isolative to her room. Good appetite. Medication compliant whole without difficulty. Reported that she did not sleep well last night, so wanted to lay back down after breakfast and continues to rest at this time. Denies SI/HI/AVH, no RTIS noted. Denies any pain at this time. Will continue to monitor.

## 2021-12-29 LAB
ANION GAP SERPL CALCULATED.3IONS-SCNC: 14 MMOL/L (ref 3–16)
BILIRUBIN URINE: NEGATIVE
BLOOD, URINE: NEGATIVE
BUN BLDV-MCNC: 21 MG/DL (ref 7–20)
CALCIUM SERPL-MCNC: 10.3 MG/DL (ref 8.3–10.6)
CHLORIDE BLD-SCNC: 103 MMOL/L (ref 99–110)
CLARITY: CLEAR
CO2: 22 MMOL/L (ref 21–32)
COLOR: YELLOW
CREAT SERPL-MCNC: 1 MG/DL (ref 0.6–1.2)
EPITHELIAL CELLS, UA: ABNORMAL /HPF (ref 0–5)
GFR AFRICAN AMERICAN: >60
GFR NON-AFRICAN AMERICAN: 54
GLUCOSE BLD-MCNC: 101 MG/DL (ref 70–99)
GLUCOSE BLD-MCNC: 120 MG/DL (ref 70–99)
GLUCOSE BLD-MCNC: 144 MG/DL (ref 70–99)
GLUCOSE URINE: NEGATIVE MG/DL
HYALINE CASTS: ABNORMAL /LPF (ref 0–2)
KETONES, URINE: NEGATIVE MG/DL
LEUKOCYTE ESTERASE, URINE: ABNORMAL
MAGNESIUM: 1.7 MG/DL (ref 1.8–2.4)
MICROSCOPIC EXAMINATION: YES
MUCUS: ABNORMAL /LPF
NITRITE, URINE: NEGATIVE
PERFORMED ON: ABNORMAL
PERFORMED ON: ABNORMAL
PH UA: 6 (ref 5–8)
POTASSIUM SERPL-SCNC: 4.2 MMOL/L (ref 3.5–5.1)
PROTEIN UA: NEGATIVE MG/DL
RBC UA: ABNORMAL /HPF (ref 0–4)
SODIUM BLD-SCNC: 139 MMOL/L (ref 136–145)
SPECIFIC GRAVITY UA: 1.01 (ref 1–1.03)
URINE REFLEX TO CULTURE: ABNORMAL
URINE TYPE: ABNORMAL
UROBILINOGEN, URINE: 0.2 E.U./DL
WBC UA: ABNORMAL /HPF (ref 0–5)

## 2021-12-29 PROCEDURE — 6370000000 HC RX 637 (ALT 250 FOR IP): Performed by: PSYCHIATRY & NEUROLOGY

## 2021-12-29 PROCEDURE — 6370000000 HC RX 637 (ALT 250 FOR IP)

## 2021-12-29 PROCEDURE — 81001 URINALYSIS AUTO W/SCOPE: CPT

## 2021-12-29 PROCEDURE — 1240000000 HC EMOTIONAL WELLNESS R&B

## 2021-12-29 PROCEDURE — 83735 ASSAY OF MAGNESIUM: CPT

## 2021-12-29 PROCEDURE — 80048 BASIC METABOLIC PNL TOTAL CA: CPT

## 2021-12-29 PROCEDURE — 36415 COLL VENOUS BLD VENIPUNCTURE: CPT

## 2021-12-29 PROCEDURE — 99233 SBSQ HOSP IP/OBS HIGH 50: CPT

## 2021-12-29 RX ORDER — DIAZEPAM 2 MG/1
2 TABLET ORAL 3 TIMES DAILY
Status: DISCONTINUED | OUTPATIENT
Start: 2021-12-29 | End: 2021-12-30 | Stop reason: HOSPADM

## 2021-12-29 RX ADMIN — DIAZEPAM 2 MG: 2 TABLET ORAL at 13:45

## 2021-12-29 RX ADMIN — OXYCODONE HYDROCHLORIDE AND ACETAMINOPHEN 500 MG: 500 TABLET ORAL at 09:10

## 2021-12-29 RX ADMIN — DIAZEPAM 2 MG: 2 TABLET ORAL at 19:53

## 2021-12-29 RX ADMIN — POTASSIUM CHLORIDE 20 MEQ: 1500 TABLET, EXTENDED RELEASE ORAL at 09:09

## 2021-12-29 RX ADMIN — OLANZAPINE 2.5 MG: 5 TABLET, FILM COATED ORAL at 19:53

## 2021-12-29 RX ADMIN — METFORMIN HYDROCHLORIDE 500 MG: 500 TABLET ORAL at 09:08

## 2021-12-29 RX ADMIN — DIAZEPAM 2 MG: 2 TABLET ORAL at 09:10

## 2021-12-29 RX ADMIN — SERTRALINE 150 MG: 100 TABLET, FILM COATED ORAL at 19:52

## 2021-12-29 RX ADMIN — AMLODIPINE BESYLATE 5 MG: 5 TABLET ORAL at 09:10

## 2021-12-29 RX ADMIN — LOSARTAN POTASSIUM 100 MG: 100 TABLET, FILM COATED ORAL at 09:09

## 2021-12-29 RX ADMIN — ASPIRIN 81 MG: 81 TABLET, CHEWABLE ORAL at 09:10

## 2021-12-29 RX ADMIN — MIRTAZAPINE 7.5 MG: 15 TABLET, FILM COATED ORAL at 19:51

## 2021-12-29 RX ADMIN — LATANOPROST 1 DROP: 50 SOLUTION OPHTHALMIC at 19:51

## 2021-12-29 RX ADMIN — MAGNESIUM GLUCONATE 500 MG ORAL TABLET 400 MG: 500 TABLET ORAL at 13:58

## 2021-12-29 RX ADMIN — METFORMIN HYDROCHLORIDE 500 MG: 500 TABLET ORAL at 16:52

## 2021-12-29 RX ADMIN — HYDROCHLOROTHIAZIDE 25 MG: 25 TABLET ORAL at 09:10

## 2021-12-29 RX ADMIN — METOPROLOL SUCCINATE 25 MG: 25 TABLET, EXTENDED RELEASE ORAL at 09:10

## 2021-12-29 RX ADMIN — ATORVASTATIN CALCIUM 20 MG: 10 TABLET, FILM COATED ORAL at 19:52

## 2021-12-29 RX ADMIN — DORZOLAMIDE HYDROCHLORIDE AND TIMOLOL MALEATE 1 DROP: 20; 5 SOLUTION/ DROPS OPHTHALMIC at 09:10

## 2021-12-29 RX ADMIN — DORZOLAMIDE HYDROCHLORIDE AND TIMOLOL MALEATE 1 DROP: 20; 5 SOLUTION/ DROPS OPHTHALMIC at 19:51

## 2021-12-29 RX ADMIN — METOPROLOL SUCCINATE 100 MG: 50 TABLET, EXTENDED RELEASE ORAL at 09:09

## 2021-12-29 RX ADMIN — HYDROXYZINE PAMOATE 50 MG: 50 CAPSULE ORAL at 16:52

## 2021-12-29 ASSESSMENT — PAIN SCALES - GENERAL
PAINLEVEL_OUTOF10: 0

## 2021-12-29 NOTE — FLOWSHEET NOTE
Senior Purposeful Rounding    Position:Right Side and Repositions Self    Physical Environment:Room free from clutter, Clear path to bathroom , Adequate lighting and No safety hazards noted    Pain Rating/ Nonverbal Pain Behaviors:denies; 0    Pain interventions Attempted: none    Patient Toileted:Continent

## 2021-12-29 NOTE — PROGRESS NOTES
Department of Psychiatry  AttendingProgress Note  Chief Complaint: Anxiety    Patient's chart was reviewed and collaborated with  about the treatment plan. SUBJECTIVE:    Pt seen lying in bed today. Pt able to talk with me clearly, no stuttering noted. Pt expressed that she was looking forward to going home, but remained a little nervous about it. Pt states that she does not want to be a burden on anyone. Pt unable to specify what activities she feels she cannot do at home. Pt, if persuaded by staff, is able to do ADL's, come out into group common areas, and talk with other patients. Pt able to work through the MMSE with me without issue, scoring a 28. Pt's  has been contacted about her return home and he requested repeat blood and urine to ensure that her levels were ok before returning home. Labs were ordered as requested. Pt ok with pt being discharged tomorrow. Pt updated on the tests and discharge plan. Pt became anxious, stuttering after mention of the tests. When asked what part of the test she was nervous about, pt could not say. Pt asked if she could come out into the dining area for lunch, causing another increase in anxiety, stating that she could not do it. I was able to sit with the patient for a few minutes to help her calm down. Pt again states that she wants to go home. Pt able to stand and walk into the bathroom without assistance. Breathing and speech cleared as patient was up and moving. Pt denies needing help in the restroom. Pt denies suicidal thoughts at this time. Pt verbalized understanding of the discharge plan. Patient is feeling better. Suicidal ideation:  denies suicidal ideation. Patient does not have medication side effects. ROS: Patient has new complaints: no  Sleeping adequately:  Yes   Appetite adequate:  Yes  Attending groups: No:   Visitors:Yes    OBJECTIVE    Physical  VITALS:  /68   Pulse 68   Temp 97.4 °F (36.3 °C) (Temporal)   Resp 17   Ht 5' (1.524 m)   Wt 180 lb 6.4 oz (81.8 kg)   LMP  (LMP Unknown)   SpO2 97%   BMI 35.23 kg/m²     Mental Status Examination:  Patients appearance was well-appearing, street clothes and lying in bed. Thoughts are Logical. Homicidal ideations none. No abnormal movements, tics or mannerisms. Memory intact Aims 0. Concentration Fair. Alert and oriented X 4. Insight and Judgement impaired insight. Patient was cooperative. Patient gait normal. Mood anxious, affect anxiety Hallucinations Absent, suicidal ideations no specific plan to harm self Speech soft    Data  Labs:   Admission on 12/16/2021   Component Date Value Ref Range Status    POC Glucose 12/17/2021 168* 70 - 99 mg/dl Final    Performed on 12/17/2021 ACCU-CHEK   Final    POC Glucose 12/17/2021 141* 70 - 99 mg/dl Final    Performed on 12/17/2021 ACCU-CHEK   Final    Potassium 12/18/2021 2.9* 3.5 - 5.1 mmol/L Final    Magnesium 12/18/2021 1.90  1.80 - 2.40 mg/dL Final    Potassium 12/18/2021 4.0  3.5 - 5.1 mmol/L Final    POC Glucose 12/18/2021 122* 70 - 99 mg/dl Final    Performed on 12/18/2021 ACCU-CHEK   Final    POC Glucose 12/18/2021 116* 70 - 99 mg/dl Final    Performed on 12/18/2021 ACCU-CHEK   Final    Sodium 12/19/2021 141  136 - 145 mmol/L Final    Potassium reflex Magnesium 12/19/2021 3.4* 3.5 - 5.1 mmol/L Final    Chloride 12/19/2021 104  99 - 110 mmol/L Final    CO2 12/19/2021 26  21 - 32 mmol/L Final    Anion Gap 12/19/2021 11  3 - 16 Final    Glucose 12/19/2021 107* 70 - 99 mg/dL Final    BUN 12/19/2021 21* 7 - 20 mg/dL Final    CREATININE 12/19/2021 1.0  0.6 - 1.2 mg/dL Final    GFR Non- 12/19/2021 54* >60 Final    Comment: >60 mL/min/1.73m2 EGFR, calc. for ages 25 and older using the  MDRD formula (not corrected for weight), is valid for stable  renal function.       GFR  12/19/2021 >60  >60 Final    Comment: Chronic Kidney Disease: less than 60 ml/min/1.73 sq.m.          Kidney Failure: less than 15 ml/min/1.73 sq.m. Results valid for patients 18 years and older.  Calcium 12/19/2021 10.0  8.3 - 10.6 mg/dL Final    POC Glucose 12/19/2021 103* 70 - 99 mg/dl Final    Performed on 12/19/2021 ACCU-CHEK   Final    Magnesium 12/19/2021 1.90  1.80 - 2.40 mg/dL Final    POC Glucose 12/19/2021 130* 70 - 99 mg/dl Final    Performed on 12/19/2021 ACCU-CHEK   Final    POC Glucose 12/20/2021 125* 70 - 99 mg/dl Final    Performed on 12/20/2021 ACCU-CHEK   Final    POC Glucose 12/20/2021 115* 70 - 99 mg/dl Final    Performed on 12/20/2021 ACCU-CHEK   Final    POC Glucose 12/20/2021 129* 70 - 99 mg/dl Final    Performed on 12/20/2021 ACCU-CHEK   Final    Sodium 12/21/2021 142  136 - 145 mmol/L Final    Potassium reflex Magnesium 12/21/2021 3.2* 3.5 - 5.1 mmol/L Final    Chloride 12/21/2021 102  99 - 110 mmol/L Final    CO2 12/21/2021 30  21 - 32 mmol/L Final    Anion Gap 12/21/2021 10  3 - 16 Final    Glucose 12/21/2021 108* 70 - 99 mg/dL Final    BUN 12/21/2021 16  7 - 20 mg/dL Final    CREATININE 12/21/2021 0.9  0.6 - 1.2 mg/dL Final    GFR Non- 12/21/2021 >60  >60 Final    Comment: >60 mL/min/1.73m2 EGFR, calc. for ages 25 and older using the  MDRD formula (not corrected for weight), is valid for stable  renal function.  GFR  12/21/2021 >60  >60 Final    Comment: Chronic Kidney Disease: less than 60 ml/min/1.73 sq.m. Kidney Failure: less than 15 ml/min/1.73 sq.m. Results valid for patients 18 years and older.       Calcium 12/21/2021 10.6  8.3 - 10.6 mg/dL Final    POC Glucose 12/21/2021 99  70 - 99 mg/dl Final    Performed on 12/21/2021 ACCU-CHEK   Final    Magnesium 12/21/2021 1.90  1.80 - 2.40 mg/dL Final    POC Glucose 12/21/2021 126* 70 - 99 mg/dl Final    Performed on 12/21/2021 ACCU-CHEK   Final    POC Glucose 12/22/2021 108* 70 - 99 mg/dl Final    Performed on 12/22/2021 ACCU-CHEK   Final    POC Glucose 12/22/2021 107* 70 - 99 mg/dl Final    Performed on 12/22/2021 ACCU-CHEK   Final    Sodium 12/23/2021 142  136 - 145 mmol/L Final    Potassium reflex Magnesium 12/23/2021 3.5  3.5 - 5.1 mmol/L Final    Chloride 12/23/2021 104  99 - 110 mmol/L Final    CO2 12/23/2021 27  21 - 32 mmol/L Final    Anion Gap 12/23/2021 11  3 - 16 Final    Glucose 12/23/2021 128* 70 - 99 mg/dL Final    BUN 12/23/2021 20  7 - 20 mg/dL Final    CREATININE 12/23/2021 0.8  0.6 - 1.2 mg/dL Final    GFR Non- 12/23/2021 >60  >60 Final    Comment: >60 mL/min/1.73m2 EGFR, calc. for ages 25 and older using the  MDRD formula (not corrected for weight), is valid for stable  renal function.  GFR  12/23/2021 >60  >60 Final    Comment: Chronic Kidney Disease: less than 60 ml/min/1.73 sq.m. Kidney Failure: less than 15 ml/min/1.73 sq.m. Results valid for patients 18 years and older.  Calcium 12/23/2021 10.4  8.3 - 10.6 mg/dL Final    POC Glucose 12/23/2021 109* 70 - 99 mg/dl Final    Performed on 12/23/2021 ACCU-CHEK   Final    Color, UA 12/23/2021 Yellow  Straw/Yellow Final    Clarity, UA 12/23/2021 Clear  Clear Final    Glucose, Ur 12/23/2021 Negative  Negative mg/dL Final    Bilirubin Urine 12/23/2021 Negative  Negative Final    Ketones, Urine 12/23/2021 Negative  Negative mg/dL Final    Specific Gravity, UA 12/23/2021 1.010  1.005 - 1.030 Final    Blood, Urine 12/23/2021 Negative  Negative Final    pH, UA 12/23/2021 6.5  5.0 - 8.0 Final    Protein, UA 12/23/2021 Negative  Negative mg/dL Final    Urobilinogen, Urine 12/23/2021 0.2  <2.0 E.U./dL Final    Nitrite, Urine 12/23/2021 Negative  Negative Final    Leukocyte Esterase, Urine 12/23/2021 LARGE* Negative Final    Microscopic Examination 12/23/2021 YES   Final    Urine Type 12/23/2021 NotGiven   Final    Urine received in a container without preservatives.     Urine Reflex to Culture 12/23/2021 Yes   Final    Magnesium 12/23/2021 1.60* 1.80 - 2.40 mg/dL Final    Hyaline Casts, UA 12/23/2021 3-5* 0 - 2 /LPF Final    Mucus, UA 12/23/2021 3+* None Seen /LPF Final    WBC, UA 12/23/2021 * 0 - 5 /HPF Final    RBC, UA 12/23/2021 3-4  0 - 4 /HPF Final    Epithelial Cells, UA 12/23/2021 6-10* 0 - 5 /HPF Final    Bacteria, UA 12/23/2021 2+* None Seen /HPF Final    Urine Culture, Routine 12/23/2021 <50,000 CFU/ml mixed skin/urogenital timothy.  No further workup   Final    POC Glucose 12/24/2021 121* 70 - 99 mg/dl Final    Performed on 12/24/2021 ACCU-CHEK   Final    POC Glucose 12/24/2021 109* 70 - 99 mg/dl Final    Performed on 12/24/2021 ACCU-CHEK   Final    POC Glucose 12/25/2021 112* 70 - 99 mg/dl Final    Performed on 12/25/2021 ACCU-CHEK   Final    POC Glucose 12/26/2021 113* 70 - 99 mg/dl Final    Performed on 12/26/2021 ACCU-CHEK   Final    POC Glucose 12/26/2021 88  70 - 99 mg/dl Final    Performed on 12/26/2021 ACCU-CHEK   Final    POC Glucose 12/27/2021 124* 70 - 99 mg/dl Final    Performed on 12/27/2021 ACCU-CHEK   Final    POC Glucose 12/28/2021 182* 70 - 99 mg/dl Final    Performed on 12/28/2021 ACCU-CHEK   Final    POC Glucose 12/29/2021 101* 70 - 99 mg/dl Final    Performed on 12/29/2021 ACCU-CHEK   Final            Medications  Current Facility-Administered Medications: ondansetron (ZOFRAN-ODT) disintegrating tablet 4 mg, 4 mg, Oral, Q8H PRN  OLANZapine (ZYPREXA) tablet 2.5 mg, 2.5 mg, Oral, Nightly  mirtazapine (REMERON) tablet 7.5 mg, 7.5 mg, Oral, Nightly  OLANZapine (ZYPREXA) tablet 2.5 mg, 2.5 mg, Oral, Q8H PRN **OR** OLANZapine (ZYPREXA) injection 2.5 mg, 2.5 mg, IntraMUSCular, Q8H PRN  ascorbic acid (VITAMIN C) tablet 500 mg, 500 mg, Oral, Daily  potassium chloride (KLOR-CON M) extended release tablet 20 mEq, 20 mEq, Oral, Daily with breakfast  diazePAM (VALIUM) tablet 2 mg, 2 mg, Oral, BID  acetaminophen (TYLENOL) tablet 650 mg, 650 mg, Oral, Q4H PRN  ibuprofen (ADVIL;MOTRIN) tablet 400 mg, 400 mg, Oral, Q6H PRN  magnesium hydroxide (MILK OF MAGNESIA) 400 MG/5ML suspension 30 mL, 30 mL, Oral, Daily PRN  aluminum & magnesium hydroxide-simethicone (MAALOX) 200-200-20 MG/5ML suspension 30 mL, 30 mL, Oral, Q6H PRN  sterile water injection 2.1 mL, 2.1 mL, IntraMUSCular, Q4H PRN  benztropine mesylate (COGENTIN) injection 2 mg, 2 mg, IntraMUSCular, BID PRN  nicotine polacrilex (COMMIT) lozenge 2 mg, 2 mg, Oral, Q1H PRN  traZODone (DESYREL) tablet 50 mg, 50 mg, Oral, Nightly PRN  hydrOXYzine (VISTARIL) capsule 50 mg, 50 mg, Oral, Q6H PRN  aspirin chewable tablet 81 mg, 81 mg, Oral, Daily  sertraline (ZOLOFT) tablet 150 mg, 150 mg, Oral, Nightly  dorzolamide-timolol (COSOPT) 22.3-6.8 MG/ML ophthalmic solution 1 drop, 1 drop, Ophthalmic, BID  atorvastatin (LIPITOR) tablet 20 mg, 20 mg, Oral, Nightly  metFORMIN (GLUCOPHAGE) tablet 500 mg, 500 mg, Oral, BID WC  metoprolol succinate (TOPROL XL) extended release tablet 25 mg, 25 mg, Oral, Daily  metoprolol succinate (TOPROL XL) extended release tablet 100 mg, 100 mg, Oral, Daily  amLODIPine (NORVASC) tablet 5 mg, 5 mg, Oral, Daily  latanoprost (XALATAN) 0.005 % ophthalmic solution 1 drop, 1 drop, Both Eyes, Nightly  losartan (COZAAR) tablet 100 mg, 100 mg, Oral, Daily **AND** hydroCHLOROthiazide (HYDRODIURIL) tablet 25 mg, 25 mg, Oral, Daily    ASSESSMENT AND PLAN    Principal Problem:    Generalized anxiety disorder  Active Problems:    Type 2 diabetes mellitus without complication, without long-term current use of insulin (HCC)    Major depression, single episode    Hypokalemia    Hypomagnesemia    Asymptomatic bacteriuria  Resolved Problems:    * No resolved hospital problems. *       1. Patient's symptoms are improving  2. Probable discharge is tomorrow  3. Discharge planning is incomplete  4. Suicidal ideation is unchanged, none  5.  Total time with patient was 40 minutes and more than 50 % of that time was spent counseling the patient on their symptoms, treatment and expected goals.      Carolann Bulla - PMHNP

## 2021-12-29 NOTE — BH NOTE
Purposeful Rounding    Patient concerns reported:NONE NOTED.  PT IN ROOM EATING    Nurse made aware:NO    Patient Turned and repositioned: Independent    Patient Toileted: Independent    Fall Precautions in Place: Yellow non-skid socks on, Bed locked in low position, 1/2 bed rails up, Lighting appropriate, Room free of clutter and Clear path to bathroom      Electronically signed by Joshua Gilbert on 12/28/21 at 7:49 PM EST

## 2021-12-29 NOTE — FLOWSHEET NOTE
Senior Purposeful Rounding    Position:Right Side and Repositions Self    Physical Environment:Room free from clutter, Clear path to bathroom , Adequate lighting and No safety hazards noted    Pain Rating/ Nonverbal Pain Behaviors:denies; 0    Pain interventions Attempted: none    Patient Toileted:No- Void;  Independent

## 2021-12-29 NOTE — BH NOTE
Purposeful Rounding    Patient concerns reported:NONE NOTED.  PT IN BED SLEEPING    Nurse made aware:NO    Patient Turned and repositioned: Independent    Patient Toileted: Independent    Fall Precautions in Place: Yellow non-skid socks on, Bed locked in low position, 1/2 bed rails up, Lighting appropriate, Room free of clutter and Clear path to bathroom      Electronically signed by Brooke Sandoval on 12/29/21 at 3:52 AM EST

## 2021-12-29 NOTE — BH NOTE
Spoke with pt's  regarding pt's progress and treatment plan. Discussed plan for discharge tomorrow, and he will be here around visiting hours to pick her up. He stated he thinks it will be good for pt to get back home in her own environment.

## 2021-12-29 NOTE — BH NOTE
Purposeful Rounding    Patient concerns reported:NONE NOTED.  PT IN BED SLEEPING    Nurse made aware:NO    Patient Turned and repositioned: Independent    Patient Toileted: Independent    Fall Precautions in Place: Yellow non-skid socks on, Bed locked in low position, 1/2 bed rails up, Lighting appropriate, Room free of clutter and Clear path to bathroom      Electronically signed by Joshua Gilbert on 12/29/21 at 5:37 AM EST

## 2021-12-29 NOTE — FLOWSHEET NOTE
Senior Purposeful Rounding    Position:Sitting and Repositions Self    Physical Environment:Room free from clutter, Clear path to bathroom , Adequate lighting and No safety hazards noted    Pain Rating/ Nonverbal Pain Behaviors:denies; 0    Pain interventions Attempted: none    Patient Toileted:Continent, Bowel Movement and Independent

## 2021-12-29 NOTE — GROUP NOTE
Group Therapy Note    Date: 12/29/2021    Group Start Time: 0100  Group End Time: 5869  Group Topic: Recreational    [id number]        Group Therapy Note    Attendees: 3    Members of the group were asked to build with Kinetic Madisyn and engaged in various conversations. Notes:  Kandis Cervantes sat at the table and  Colored. Kandis Cervantes visibly trembled for the full duration and had difficulty speaking.        Status After Intervention:  Unchanged    Participation Level: Minimal    Participation Quality: Resistant      Speech:  Slurred, hesitant       Thought Process/Content: Linear      Affective Functioning: Constricted/Restricted      Mood: anxious      Level of consciousness:  Inattentive      Response to Learning: Able to verbalize current knowledge/experience      Endings: None Reported    Modes of Intervention: Activity      Discipline Responsible: [facility] [employer] [facility]      Signature:  AURY Raymond

## 2021-12-29 NOTE — BH NOTE
Purposeful Rounding    Patient concerns reported:NONE NOTED.  PT IN BED SLEEPING    Nurse made aware:NO    Patient Turned and repositioned: Independent    Patient Toileted: Independent    Fall Precautions in Place:       Electronically signed by Freddy Terrell on 12/28/21 at 9:44 PM EST

## 2021-12-29 NOTE — FLOWSHEET NOTE
Senior Purposeful Rounding    Position:Sitting and Repositions Self, In shower    Physical Environment:Room free from clutter, Clear path to bathroom , Adequate lighting and No safety hazards noted    Pain Rating/ Nonverbal Pain Behaviors:denies; none    Pain interventions Attempted: NA    Patient Toileted:Independent

## 2021-12-29 NOTE — PLAN OF CARE
Problem: Falls - Risk of:  Goal: Will remain free from falls  Description: Will remain free from falls  Outcome: Ongoing     Problem: Altered Mood, Depressive Behavior:  Goal: Able to verbalize and/or display a decrease in depressive symptoms  Description: Able to verbalize and/or display a decrease in depressive symptoms  12/28/2021 2231 by Cecilia Ramírez RN  Outcome: Ongoing    Pt has been isolative to room. She denies SI/HI/AVH and no RTIS noted. She has been very anxious and refusing to come out of her room. She did eat snacks and drank water. She was medication compliant. She is currently resting in her bed.  Denies needs currently

## 2021-12-29 NOTE — FLOWSHEET NOTE
Senior Purposeful Rounding    Position:Ambulating in faye    Physical Environment:Room free from clutter, Clear path to bathroom , Adequate lighting and No safety hazards noted    Pain Rating/ Nonverbal Pain Behaviors:denies; 0    Pain interventions Attempted: none    Patient Toileted:Continent and Independent

## 2021-12-30 VITALS
SYSTOLIC BLOOD PRESSURE: 96 MMHG | TEMPERATURE: 97.3 F | BODY MASS INDEX: 35.42 KG/M2 | WEIGHT: 180.4 LBS | RESPIRATION RATE: 18 BRPM | HEIGHT: 60 IN | HEART RATE: 73 BPM | DIASTOLIC BLOOD PRESSURE: 57 MMHG | OXYGEN SATURATION: 95 %

## 2021-12-30 PROCEDURE — 5130000000 HC BRIDGE APPOINTMENT

## 2021-12-30 PROCEDURE — 97535 SELF CARE MNGMENT TRAINING: CPT

## 2021-12-30 PROCEDURE — 99238 HOSP IP/OBS DSCHRG MGMT 30/<: CPT

## 2021-12-30 PROCEDURE — 6370000000 HC RX 637 (ALT 250 FOR IP)

## 2021-12-30 PROCEDURE — 6370000000 HC RX 637 (ALT 250 FOR IP): Performed by: PSYCHIATRY & NEUROLOGY

## 2021-12-30 RX ORDER — OLANZAPINE 2.5 MG/1
2.5 TABLET ORAL NIGHTLY
Qty: 30 TABLET | Refills: 0 | Status: SHIPPED | OUTPATIENT
Start: 2021-12-30

## 2021-12-30 RX ORDER — TRAZODONE HYDROCHLORIDE 50 MG/1
50 TABLET ORAL NIGHTLY PRN
Qty: 30 TABLET | Refills: 0 | Status: SHIPPED | OUTPATIENT
Start: 2021-12-30 | End: 2022-01-04

## 2021-12-30 RX ORDER — DIAZEPAM 2 MG/1
2 TABLET ORAL 3 TIMES DAILY
Qty: 30 TABLET | Refills: 0 | Status: SHIPPED | OUTPATIENT
Start: 2021-12-30 | End: 2022-01-04

## 2021-12-30 RX ORDER — MIRTAZAPINE 7.5 MG/1
7.5 TABLET, FILM COATED ORAL NIGHTLY
Qty: 30 TABLET | Refills: 0 | Status: SHIPPED | OUTPATIENT
Start: 2021-12-30

## 2021-12-30 RX ADMIN — DIAZEPAM 2 MG: 2 TABLET ORAL at 13:04

## 2021-12-30 RX ADMIN — OXYCODONE HYDROCHLORIDE AND ACETAMINOPHEN 500 MG: 500 TABLET ORAL at 09:21

## 2021-12-30 RX ADMIN — METFORMIN HYDROCHLORIDE 500 MG: 500 TABLET ORAL at 09:21

## 2021-12-30 RX ADMIN — ASPIRIN 81 MG: 81 TABLET, CHEWABLE ORAL at 09:21

## 2021-12-30 RX ADMIN — DIAZEPAM 2 MG: 2 TABLET ORAL at 09:21

## 2021-12-30 ASSESSMENT — PAIN SCALES - GENERAL
PAINLEVEL_OUTOF10: 0

## 2021-12-30 NOTE — PROGRESS NOTES
Inpatient Occupational Therapy Treatment Note    Unit:  [facility]  Date:  12/30/2021  Patient Name:    Cristy Hollis  Admitting diagnosis:  Major depression, single episode [F32.9]  Admit Date:  12/16/2021  Precautions/Restrictions/WB Status/ Lines/ Wounds/ Oxygen:  Standard BHI Precautions  Fall Risk     History of Present Illness:  Nico Stephens, 12/17/2021:   \" Patient seen in room on Adult Behavioral Unit.   Patient is a 67 y. o. female who presents to the [facility] unit from [facility] ED.  Pt came to ED by squad following increasing anxiety that has caused her to be unable to care for herself safely at home and affected sleep and eating as well.  Pt has also not been taking her medications as they were prescribed.  Pt is a poor historian, becoming increasingly anxious while trying to have a conversation.  Pt begins to stutter and become upset when trying to answer questions, stating \"I'm just so confused\".  Pt's  states that he does help with her medications, but did not know how often she should have been taking each medication.  He describes having to help her more with ADL's and she grows increasingly anxious, unable to eat or care for herself. Brianna Colón do have home health visit twice a week for PT and bathing.  Pts  does not feel that she is safe at home in her current condition and wants to get her medications straight and help her build her strength back up.  Pt states that she can walk short distances in the home but does not walk much outside of the home.  Pt denies suicidal thoughts.  \"     Treatment Number:  3    Treatment Time: 476-659  Timed Code Treatment Minutes:  minutes   Total Treatment Time:    55  minutes    Staff Recommendations:    Assist of 1    Discharge Recommendations:  Home with PRN assist and Home OT and HHPT    DME needs for discharge:  Needs Met    AM-PAC Score: AM-PAC Inpatient Daily Activity Raw Score: 19   Home Health S4 Level: NA      MOCA:  To be assessed    Subjective: Pt was found in common room today eating her breakfast.  Was agreeable to OT tx with encouragement. ADLs:  Self Care:  Feeding: IND  Interests/coping skills: Attempted interest checklist today. Pt had been eating but pushed the tray away and agreed to attempt OT tx. She presented as anxious and stuttered throughout the discussion. Asked if she enjoyed several activities but she became more visibly anxious. Instead of continuing the assessment, Pt was asked if she could think of things she enjoyed doing. Family was mentioned and she agreed that she likes visiting with her grandchildren. Instructed pt to think about things she enjoys and to make a list on her own (d/c is planned for later today). Instructed her that these activities can be used as coping skills. Pt verbalized understanding, then asked if she could finish eating. Treatment was concluded at this point. Pain   No  Rating:NA  Location: NA  Pain Medicine Status: No request made      Cognition    Appears A&O but orientation not directly assessed. Able to follow:  1 step commands    Balance:     Not tested    Bed mobility:  Not tested    Transfer Training:   Sit to stand:   Not tested  Stand to sit:  Not tested  Bed to Chair:  Not tested  Standard toilet:   Not tested    Activity Tolerance   Pt completed therapy session with No adverse symptoms noted w/activity    Therapeutic Exercise:   NA    Patient Education:   Role of OT and coping skills    Positioning Needs: In common room with needs met. Family Present:  No    Assessment: Pt was agreeable to treatment today and was somewhat receptive to OT recommendations. Pt should continue to benefit from skilled OT tx to maximize independence so that she may eventually return home with the least amount of assistance. GOALS  To be met in 3 Visits:  1. Pt. To verbalize 3 coping skills. MET  2. Pt to complete MOCA.     To be met in 5 Visits:  1. Pt.  To complete interest checklist.    2. Pt. To verbalize understanding of sleep hygiene education. MET  3. Pt. To verbalize understanding of 3 communication skills. 4. Pt. To complete daily schedule of healthy activities/routines with minimal assist.  5. Full Body bathing: Independent  6. Full Body dressing: Independent  7. HEP: To complete UE exercises independently, per handout. 8. Pt to actively participate in OT groups at least 1x/week.      Plan: cont with [facility] MS, OTR/L  #[id number]            If patient discharges from this facility prior to next visit, this note will serve as the Discharge Summary

## 2021-12-30 NOTE — DISCHARGE SUMMARY
Discharge Summary     Admit Date: 12/16/2021   Discharge Date:    12/30/2021    Spent over 40 minutes with patient and staff on [address] more than 50 % of that time was spent counseling the patient on their symptoms, treatment, and expected goals. Final Dx: Generalized anxiety disorder     At Discharge: 61-70 mild symptoms   All conditions and active problems were treated while patient was hospitalized. Condition on DC  Mood and affect are stable and pt is not suicidal     VITALS:  BP (!) 96/57   Pulse 73   Temp 97.3 °F (36.3 °C) (Temporal)   Resp 18   Ht 5' (1.524 m)   Wt 180 lb 6.4 oz (81.8 kg)   LMP  (LMP Unknown)   SpO2 95%   BMI 35.23 kg/m²     Brief Summary Present Illness   Devin Tabor is a 67 y.o. female who presented to [facility]  ED with complaints of anxiety. Patient states that she has been having issues with her anxiety.  does assist with history patient has been essentially incapacitated from her anxiety, not taking care of her self or eating. Patient has not been compliant with some of her medications that were ordered to treat her anxiety. Pt stated she was anxious and depressed upon admission to [facility]. Pt will self-isolate if not encouraged to come out of her room and interact with others. Pt will begin to stutter when nervous and states she does not know what to do. Memory is intact. Pt able to speak clearly and without issue when talking about her past, her family, and things she enjoys to do. When speaking about the current situation and things that need to be done, pt will become nervous and states she doesn't think she can do it. Pt has been able to perform her ADL's independently and verbalizes that she can do it. Pt verbalized her desire to go home and states she is happy to be going home with her  today.   Pt verbalized understanding of the appointments that have been set up for her and the home care that will be in place for her 2.40 mg/dL Final    POC Glucose 12/19/2021 130* 70 - 99 mg/dl Final    Performed on 12/19/2021 ACCU-CHEK   Final    POC Glucose 12/20/2021 125* 70 - 99 mg/dl Final    Performed on 12/20/2021 ACCU-CHEK   Final    POC Glucose 12/20/2021 115* 70 - 99 mg/dl Final    Performed on 12/20/2021 ACCU-CHEK   Final    POC Glucose 12/20/2021 129* 70 - 99 mg/dl Final    Performed on 12/20/2021 ACCU-CHEK   Final    Sodium 12/21/2021 142  136 - 145 mmol/L Final    Potassium reflex Magnesium 12/21/2021 3.2* 3.5 - 5.1 mmol/L Final    Chloride 12/21/2021 102  99 - 110 mmol/L Final    CO2 12/21/2021 30  21 - 32 mmol/L Final    Anion Gap 12/21/2021 10  3 - 16 Final    Glucose 12/21/2021 108* 70 - 99 mg/dL Final    BUN 12/21/2021 16  7 - 20 mg/dL Final    CREATININE 12/21/2021 0.9  0.6 - 1.2 mg/dL Final    GFR Non- 12/21/2021 >60  >60 Final    Comment: >60 mL/min/1.73m2 EGFR, calc. for ages 25 and older using the  MDRD formula (not corrected for weight), is valid for stable  renal function.  GFR  12/21/2021 >60  >60 Final    Comment: Chronic Kidney Disease: less than 60 ml/min/1.73 sq.m. Kidney Failure: less than 15 ml/min/1.73 sq.m. Results valid for patients 18 years and older.       Calcium 12/21/2021 10.6  8.3 - 10.6 mg/dL Final    POC Glucose 12/21/2021 99  70 - 99 mg/dl Final    Performed on 12/21/2021 ACCU-CHEK   Final    Magnesium 12/21/2021 1.90  1.80 - 2.40 mg/dL Final    POC Glucose 12/21/2021 126* 70 - 99 mg/dl Final    Performed on 12/21/2021 ACCU-CHEK   Final    POC Glucose 12/22/2021 108* 70 - 99 mg/dl Final    Performed on 12/22/2021 ACCU-CHEK   Final    POC Glucose 12/22/2021 107* 70 - 99 mg/dl Final    Performed on 12/22/2021 ACCU-CHEK   Final    Sodium 12/23/2021 142  136 - 145 mmol/L Final    Potassium reflex Magnesium 12/23/2021 3.5  3.5 - 5.1 mmol/L Final    Chloride 12/23/2021 104  99 - 110 mmol/L Final    CO2 12/23/2021 27  21 - 32 mmol/L Final    Bacteria, UA 12/23/2021 2+* None Seen /HPF Final    Urine Culture, Routine 12/23/2021 <50,000 CFU/ml mixed skin/urogenital timothy. No further workup   Final    POC Glucose 12/24/2021 121* 70 - 99 mg/dl Final    Performed on 12/24/2021 ACCU-CHEK   Final    POC Glucose 12/24/2021 109* 70 - 99 mg/dl Final    Performed on 12/24/2021 ACCU-CHEK   Final    POC Glucose 12/25/2021 112* 70 - 99 mg/dl Final    Performed on 12/25/2021 ACCU-CHEK   Final    POC Glucose 12/26/2021 113* 70 - 99 mg/dl Final    Performed on 12/26/2021 ACCU-CHEK   Final    POC Glucose 12/26/2021 88  70 - 99 mg/dl Final    Performed on 12/26/2021 ACCU-CHEK   Final    POC Glucose 12/27/2021 124* 70 - 99 mg/dl Final    Performed on 12/27/2021 ACCU-CHEK   Final    POC Glucose 12/28/2021 182* 70 - 99 mg/dl Final    Performed on 12/28/2021 ACCU-CHEK   Final    POC Glucose 12/29/2021 101* 70 - 99 mg/dl Final    Performed on 12/29/2021 ACCU-CHEK   Final    Sodium 12/29/2021 139  136 - 145 mmol/L Final    Potassium 12/29/2021 4.2  3.5 - 5.1 mmol/L Final    Chloride 12/29/2021 103  99 - 110 mmol/L Final    CO2 12/29/2021 22  21 - 32 mmol/L Final    Anion Gap 12/29/2021 14  3 - 16 Final    Glucose 12/29/2021 120* 70 - 99 mg/dL Final    BUN 12/29/2021 21* 7 - 20 mg/dL Final    CREATININE 12/29/2021 1.0  0.6 - 1.2 mg/dL Final    GFR Non- 12/29/2021 54* >60 Final    Comment: >60 mL/min/1.73m2 EGFR, calc. for ages 25 and older using the  MDRD formula (not corrected for weight), is valid for stable  renal function.  GFR  12/29/2021 >60  >60 Final    Comment: Chronic Kidney Disease: less than 60 ml/min/1.73 sq.m. Kidney Failure: less than 15 ml/min/1.73 sq.m. Results valid for patients 18 years and older.       Calcium 12/29/2021 10.3  8.3 - 10.6 mg/dL Final    Magnesium 12/29/2021 1.70* 1.80 - 2.40 mg/dL Final    Color, UA 12/29/2021 Yellow  Straw/Yellow Final    Clarity, UA 12/29/2021 Clear  Clear Final    Glucose, Ur 12/29/2021 Negative  Negative mg/dL Final    Bilirubin Urine 12/29/2021 Negative  Negative Final    Ketones, Urine 12/29/2021 Negative  Negative mg/dL Final    Specific Laura, UA 12/29/2021 1.010  1.005 - 1.030 Final    Blood, Urine 12/29/2021 Negative  Negative Final    pH, UA 12/29/2021 6.0  5.0 - 8.0 Final    Protein, UA 12/29/2021 Negative  Negative mg/dL Final    Urobilinogen, Urine 12/29/2021 0.2  <2.0 E.U./dL Final    Nitrite, Urine 12/29/2021 Negative  Negative Final    Leukocyte Esterase, Urine 12/29/2021 TRACE* Negative Final    Microscopic Examination 12/29/2021 YES   Final    Urine Type 12/29/2021 NotGiven   Final    Urine Reflex to Culture 12/29/2021 Not Indicated   Final    Hyaline Casts, UA 12/29/2021 0-2  0 - 2 /LPF Final    Mucus, UA 12/29/2021 Rare* None Seen /LPF Final    WBC, UA 12/29/2021 6-9* 0 - 5 /HPF Final    RBC, UA 12/29/2021 0-2  0 - 4 /HPF Final    Epithelial Cells, UA 12/29/2021 2-5  0 - 5 /HPF Final    POC Glucose 12/29/2021 144* 70 - 99 mg/dl Final    Performed on 12/29/2021 ACCU-CHEK   Final          Mental Status Exam at Discharge:  Level of consciousness:  awake  Appearance:  well-appearing, in chair, good grooming and good hygiene well-developed, well-nourished  Behavior/Motor:  no abnormalities noted normal gait and station AIMS: 0  Attitude toward examiner:  cooperative, attentive and good eye contact  Speech:  spontaneous, normal rate, normal volume and well articulated  Mood:  dysthymic  Affect:  mood congruent Anxiety: mild  Hallucinations: Absent  Thought processes:  coherent Attention span, Concentration & Attention:  attention span and concentration were age appropriate  Thought content:  No evidence of delusions OCD: none    Insight: normal insight and judgment Cognition:  oriented to person, place, and time  Fund of Knowledge: average  IQ:average Memory: intact  Suicide:  No specific plan to harm self  Sleep: sleeps through the night  Appetite: ok     Reassess Suicide Risk:  no specific plan to harm self Pt has phone numbers to contact if suicidal thoughts recur and states pt will return to the hospital if suicidal feelings return. Hospital Routine Meds:     diazePAM  2 mg Oral TID    OLANZapine  2.5 mg Oral Nightly    mirtazapine  7.5 mg Oral Nightly    ascorbic acid  500 mg Oral Daily    potassium chloride  20 mEq Oral Daily with breakfast    aspirin  81 mg Oral Daily    sertraline  150 mg Oral Nightly    dorzolamide-timolol  1 drop Ophthalmic BID    atorvastatin  20 mg Oral Nightly    metFORMIN  500 mg Oral BID WC    metoprolol succinate  25 mg Oral Daily    metoprolol succinate  100 mg Oral Daily    amLODIPine  5 mg Oral Daily    latanoprost  1 drop Both Eyes Nightly    losartan  100 mg Oral Daily    And    hydroCHLOROthiazide  25 mg Oral Daily      Hospital PRN Meds: ondansetron, OLANZapine **OR** OLANZapine, acetaminophen, ibuprofen, magnesium hydroxide, aluminum & magnesium hydroxide-simethicone, sterile water, benztropine mesylate, nicotine polacrilex, traZODone, hydrOXYzine   Discharge Meds:    Current Discharge Medication List           Details   diazePAM (VALIUM) 2 MG tablet Take 1 tablet by mouth 3 times daily for 10 days.   Qty: 30 tablet, Refills: 0    Associated Diagnoses: Generalized anxiety disorder      mirtazapine (REMERON) 7.5 MG tablet Take 1 tablet by mouth nightly  Qty: 30 tablet, Refills: 0      traZODone (DESYREL) 50 MG tablet Take 1 tablet by mouth nightly as needed for Sleep  Qty: 30 tablet, Refills: 0      OLANZapine (ZYPREXA) 2.5 MG tablet Take 1 tablet by mouth nightly  Qty: 30 tablet, Refills: 0              Details   latanoprost (XALATAN) 0.005 % ophthalmic solution Place 1 drop into both eyes nightly      losartan-hydroCHLOROthiazide (HYZAAR) 100-25 MG per tablet TAKE ONE TABLET BY MOUTH DAILY  Qty: 30 tablet, Refills: 5    Associated Diagnoses: Essential hypertension      amLODIPine (NORVASC) 5 MG tablet TAKE ONE TABLET BY MOUTH DAILY  Qty: 30 tablet, Refills: 5    Associated Diagnoses: Essential hypertension      !! metoprolol succinate (TOPROL XL) 100 MG extended release tablet TAKE ONE TABLET BY MOUTH DAILY, TAKE WITH 25MG DOSE FOR A TOTAL OF 125MG DAILY  Qty: 30 tablet, Refills: 3    Associated Diagnoses: Essential hypertension      !! metoprolol succinate (TOPROL XL) 25 MG extended release tablet TAKE ONE TABLET BY MOUTH DAILY  Qty: 30 tablet, Refills: 3    Associated Diagnoses: Essential hypertension      metFORMIN (GLUCOPHAGE) 500 MG tablet TAKE ONE TABLET BY MOUTH TWICE A DAY WITH MEALS  Qty: 60 tablet, Refills: 5    Associated Diagnoses: Prediabetes      atorvastatin (LIPITOR) 20 MG tablet TAKE ONE TABLET BY MOUTH DAILY  Qty: 30 tablet, Refills: 5    Associated Diagnoses: Other hyperlipidemia      dorzolamide-timolol 22.3-6.8 MG/ML SOLN Apply 1 drop to eye 2 times daily USE DROPS ON RIGHT EYE ONLY BID.      sertraline (ZOLOFT) 100 MG tablet Take 1.5 tablets by mouth nightly  Qty: 135 tablet, Refills: 1    Associated Diagnoses: Depression, unspecified depression type      aspirin 81 MG tablet Take 1 tablet by mouth daily  Qty: 90 tablet, Refills: 1       !! - Potential duplicate medications found. Please discuss with provider. Disposition - Residence Home with      Follow Up:  See Discharge Instructions     Total time with patient was 40 minutes and more than 50 % of that time was spent counseling the patient on their symptoms, treatment and expected goals.      Elie Matos - PMHNP

## 2021-12-30 NOTE — BH NOTE
Clinician worked with patient 1:1 to help calm her down by offering coloring pages and markers and relaxation techniques until her  arrived to pick her up and take her home.     32 Brandie Gallardo, ROBERT

## 2021-12-30 NOTE — PLAN OF CARE
Patient denies SI/HI, AVH and pain. Patient visibly anxious, shaking and difficulty finding words. Vistaril 50mg PRN given, effect. Patient medication compliant. Patient remains free of falls at this time. Isolative to room and self.

## 2021-12-30 NOTE — BH NOTE
Purposeful Rounding    Patient concerns reported: None noted, patient in bed resting    Nurse made aware: N/A    Patient Turned and repositioned: Independent    Patient Toileted: Independent    Fall Precautions in Place:  Yellow non-skid socks on, Bed locked in low position, 1/2 bed rails up, Lighting appropriate, Room free of clutter and Clear path to bathroom         Electronically signed by Stacia Urrutia on 12/29/21 at 9:13 PM EST

## 2021-12-30 NOTE — PROGRESS NOTES
[facility]  Discharge Note    Pt discharged with followings belongings:   Dental Appliances: None  Vision - Corrective Lenses: None  Hearing Aid: None  Jewelry: Ring (x2)  Body Piercings Removed: N/A  Clothing: Carylon Mistry / coat,Pants,Shirt,Bathrobe,Socks,Undergarments (Comment),Pajamas  Were All Patient Medications Collected?: Not Applicable  Other Valuables: None   Valuables sent home with  and Patient education on aftercare instructions was reviewed  at 2:17 PM .Patient verbalize understanding of AVS:  yes. Status EXAM upon discharge:  Status and Exam  Normal: No  Facial Expression: Worried,Sad  Affect: Stable  Level of Consciousness: Alert  Mood:Normal: No  Mood: Anxious,Depressed  Motor Activity:Normal: No  Motor Activity: Decreased,Tremors  Interview Behavior: Evasive  Preception: [place] to Person,Gr[place] to Place  Attention:Normal: No  Attention: Unable to Concentrate  Thought Processes: Blocking  Thought Content:Normal: No  Thought Content: Preoccupations  Hallucinations: None  Delusions: No  Delusions:  Other(See Comment) (paranoia)  Memory:Normal: No  Memory: Poor Recent  Insight and Judgment: No  Insight and Judgment: Poor Judgment,Poor Insight  Present Suicidal Ideation: No  Present Homicidal Ideation: No      Metabolic Screening:    Lab Results   Component Value Date    LABA1C 5.7 11/04/2021       Lab Results   Component Value Date    CHOL 142 05/08/2020    CHOL 150 04/30/2019    CHOL 224 (H) 04/04/2018    CHOL 237 (H) 05/02/2017    CHOL 190 03/16/2015    CHOL 190 08/09/2012    CHOL 188 02/03/2012    CHOL 169 03/29/2011    CHOL 183 07/15/2010     Lab Results   Component Value Date    TRIG 97 05/08/2020    TRIG 82 04/30/2019    TRIG 63 04/04/2018    TRIG 103 05/02/2017    TRIG 93 03/16/2015    TRIG 85 08/09/2012    TRIG 103 02/03/2012    TRIG 97 03/29/2011    TRIG 98 07/15/2010     Lab Results   Component Value Date    HDL 63 (H) 05/08/2020    HDL 70 (H) 04/30/2019    HDL 77 (H) 04/04/2018    HDL 69 (H) 05/02/2017    HDL 67 (H) 03/16/2015    HDL 66 (H) 08/09/2012    HDL 66 (H) 02/03/2012    HDL 60 03/29/2011    HDL 55 07/15/2010     No components found for: LDLCAL  Lab Results   Component Value Date    LABVLDL 19 05/08/2020    LABVLDL 16 04/30/2019    LABVLDL 13 04/04/2018    LABVLDL 21 05/02/2017    LABVLDL 19 03/16/2015    LABVLDL 17 08/09/2012    LABVLDL 21 02/03/2012    LABVLDL 19 03/29/2011    LABVLDL 20 07/15/2010     Bridge Appointment completed: Reviewed Discharge Instructions with patient. Patient verbalizes understanding and agreement with the discharge plan using the teachback method.      Referral for Outpatient Tobacco Cessation Counseling, upon discharge (federico X if applicable and completed):    ( )  Hospital staff assisted patient to call Quit Line or faxed referral                                   during hospitalization                  ( )  Recognizing danger situations (included triggers and roadblocks), if not completed on admission                    ( )  Coping skills (new ways to manage stress, exercise, relaxation techniques, changing routine, distraction), if not completed on admission                                                           ( )  Basic information about quitting (benefits of quitting, techniques in how to quit, available resources, if not completed on admission  ( ) Referral for counseling faxed to Fr[facility]   ( ) Patient refused referral  ( ) Patient refused counseling  ( x) Patient refused smoking cessation medication upon discharge    Vaccinations (federico X if applicable and completed):  ( ) Patient states already received influenza vaccine elsewhere  ( ) Patient received influenza vaccine during this hospitalization  (x ) Patient refused influenza vaccine at this time    Jean Carlos Vásquez RN

## 2021-12-30 NOTE — FLOWSHEET NOTE
Senior Purposeful Rounding     Position:Repositions Self     Physical Environment:Room free from clutter, Clear path to bathroom , Adequate lighting and No safety hazards noted     Pain Rating/ Nonverbal Pain Behaviors:denies; none     Pain interventions Attempted: NA     Patient Toileted:Independent

## 2021-12-30 NOTE — GROUP NOTE
Group Therapy Note    Date: 12/30/2021    Group Start Time: 1100  Group End Time: 5168  Group Topic: Psychoeducation    [facility]        Group Therapy Note    Topic: Emotions & their impact on Behaviors and Thoughts    Attendees: 3         Notes:  Frannie Johnson was present across group, attentive to peers and facilitator during discussions, nodding in agreement with no verbal engagement despite prompts from facilitator. At close of session pt reported desire to return to room \"to be alone\".     Status After Intervention:  Improved    Participation Level: None    Participation Quality: Appropriate and Attentive      Speech:  mute      Thought Process/Content: Linear      Affective Functioning: Blunted      Mood: anxious      Level of consciousness:  Attentive      Response to Learning: Capable of insight and Progressing to goal      Endings: None Reported    Modes of Intervention: Education, Socialization and Exploration      Discipline Responsible: Psychoeducational Specialist      Signature:  Kyra Rutherford MM, MT-BC

## 2022-01-03 ENCOUNTER — CARE COORDINATION (OUTPATIENT)
Dept: CARE COORDINATION | Age: 77
End: 2022-01-03

## 2022-01-03 NOTE — CARE COORDINATION
[facility] outreached to patient  Ghulam Abdi who said patient was recently discharged from [facility]. Ghulam Abdi said patient has not had much improvement and that patient was terrified of the hospital. M apologized that patient had that type of experience. [facility] did offer care coordination to Ghulam Abdi for his wife and he declined at this time. Ghulam Abdi said Nadira Oliveira is coming to work with patient at this time. [facility] provided contact information in case Ghulam Abdi would like care coordination for wife in the future. [facility] will inform PCP of this outreach.

## 2022-01-04 ENCOUNTER — VIRTUAL VISIT (OUTPATIENT)
Dept: FAMILY MEDICINE CLINIC | Age: 77
End: 2022-01-04
Payer: MEDICARE

## 2022-01-04 DIAGNOSIS — E78.49 OTHER HYPERLIPIDEMIA: ICD-10-CM

## 2022-01-04 DIAGNOSIS — F41.9 ANXIETY: ICD-10-CM

## 2022-01-04 DIAGNOSIS — R79.0 LOW MAGNESIUM LEVEL: ICD-10-CM

## 2022-01-04 DIAGNOSIS — Z09 HOSPITAL DISCHARGE FOLLOW-UP: Primary | ICD-10-CM

## 2022-01-04 PROCEDURE — G2012 BRIEF CHECK IN BY MD/QHP: HCPCS | Performed by: NURSE PRACTITIONER

## 2022-01-04 PROCEDURE — 1111F DSCHRG MED/CURRENT MED MERGE: CPT | Performed by: NURSE PRACTITIONER

## 2022-01-04 RX ORDER — ATORVASTATIN CALCIUM 20 MG/1
TABLET, FILM COATED ORAL
Qty: 30 TABLET | Refills: 5 | Status: SHIPPED | OUTPATIENT
Start: 2022-01-04 | End: 2022-04-06 | Stop reason: SDUPTHER

## 2022-01-04 RX ORDER — LORAZEPAM 0.5 MG/1
0.5 TABLET ORAL 2 TIMES DAILY
COMMUNITY
End: 2022-04-06

## 2022-01-04 RX ORDER — MAGNESIUM 200 MG
200 TABLET ORAL DAILY
Qty: 30 TABLET | Refills: 3 | Status: SHIPPED | OUTPATIENT
Start: 2022-01-04 | End: 2022-01-05 | Stop reason: SDUPTHER

## 2022-01-04 ASSESSMENT — ENCOUNTER SYMPTOMS
RESPIRATORY NEGATIVE: 1
GASTROINTESTINAL NEGATIVE: 1

## 2022-01-04 NOTE — PROGRESS NOTES
Post-Discharge Transitional Care Management Services or Hospital Follow Up      Dani Villalba   YOB: 1945    Date of Office Visit:  1/4/2022  Date of Hospital Admission: 12/16/21  Date of Hospital Discharge: 12/30/21  Readmission Risk Score(high >=14%. Medium >=10%):Readmission Risk Score: 12.3 ( )      Care management risk score Rising risk (score 2-5) and Complex Care (Scores >=6): 2     Non face to face  following discharge, date last encounter closed (first attempt may have been earlier): *No documented post hospital discharge outreach found in the last 14 days *No documented post hospital discharge outreach found in the last 14 days    Call initiated 2 business days of discharge: *No response recorded in the last 14 days     Patient Active Problem List   Diagnosis    HTN (hypertension)    Fibromyalgia    Prediabetes    Primary localized osteoarthrosis, lower leg    History of total knee arthroplasty, bilateral    Glaucoma    Sebaceous cyst of skin of right breast    TANIKA (stress urinary incontinence, female)    Morbidly obese ([address]    Major depressive disorder, recurrent, moderate ([address]    Type 2 diabetes mellitus without complication, without long-term current use of insulin ([address]    Anxiety    Panic attack    Major depression, single episode    Hypokalemia    Hypomagnesemia    Asymptomatic bacteriuria    Generalized anxiety disorder       Allergies   Allergen Reactions    Norco [Hydrocodone-Acetaminophen] Nausea Only    Vanilla     Ace Inhibitors Rash     Coughing and itching    Tape [Adhesive Tape] Rash       Medications listed as ordered at the time of discharge from hospital     Medication List          Accurate as of January 4, 2022  9:18 PM. If you have any questions, ask your nurse or doctor.             START taking these medications    magnesium 200 MG Tabs tablet  Take 1 tablet by mouth daily  Started by: SHEY Newton - CNP        CHANGE how you take these medications    atorvastatin 20 MG tablet  Commonly known as: LIPITOR  TAKE ONE TABLET BY MOUTH DAILY  What changed: See the new instructions. OLANZapine 2.5 MG tablet  Commonly known as: ZYPREXA  Take 1 tablet by mouth nightly  What changed: how much to take        CONTINUE taking these medications    amLODIPine 5 MG tablet  Commonly known as: NORVASC  TAKE ONE TABLET BY MOUTH DAILY     aspirin 81 MG tablet  Take 1 tablet by mouth daily     dorzolamide-timolol 22.3-6.8 MG/ML Soln     latanoprost 0.005 % ophthalmic solution  Commonly known as: XALATAN     LORazepam 0.5 MG tablet  Commonly known as: ATIVAN     losartan-hydroCHLOROthiazide 100-25 MG per tablet  Commonly known as: HYZAAR  TAKE ONE TABLET BY MOUTH DAILY     metFORMIN 500 MG tablet  Commonly known as: GLUCOPHAGE  TAKE ONE TABLET BY MOUTH TWICE A DAY WITH MEALS     * metoprolol succinate 25 MG extended release tablet  Commonly known as: TOPROL XL  TAKE ONE TABLET BY MOUTH DAILY     * metoprolol succinate 100 MG extended release tablet  Commonly known as: TOPROL XL  TAKE ONE TABLET BY MOUTH DAILY, TAKE WITH 25MG DOSE FOR A TOTAL OF 125MG DAILY     mirtazapine 7.5 MG tablet  Commonly known as: REMERON  Take 1 tablet by mouth nightly     sertraline 100 MG tablet  Commonly known as: ZOLOFT  Take 1.5 tablets by mouth nightly         * This list has 2 medication(s) that are the same as other medications prescribed for you. Read the directions carefully, and ask your doctor or other care provider to review them with you.                Where to Get Your Medications      These medications were sent to [address]    Phone: [phone]   · atorvastatin 20 MG tablet  · magnesium 200 MG Tabs tablet           Medications marked \"taking\" at this time  Outpatient Medications Marked as Taking for the 1/4/22 encounter (Virtual Visit) with Yue Lawler, APRN - CNP   Medication Sig Dispense Refill    atorvastatin (LIPITOR) 20 MG tablet TAKE ONE TABLET BY MOUTH DAILY 30 tablet 5    LORazepam (ATIVAN) 0.5 MG tablet Take 0.5 mg by mouth 2 times daily.  magnesium 200 MG TABS tablet Take 1 tablet by mouth daily 30 tablet 3    mirtazapine (REMERON) 7.5 MG tablet Take 1 tablet by mouth nightly 30 tablet 0    OLANZapine (ZYPREXA) 2.5 MG tablet Take 1 tablet by mouth nightly (Patient taking differently: Take 5 mg by mouth nightly ) 30 tablet 0    latanoprost (XALATAN) 0.005 % ophthalmic solution Place 1 drop into both eyes nightly      losartan-hydroCHLOROthiazide (HYZAAR) 100-25 MG per tablet TAKE ONE TABLET BY MOUTH DAILY 30 tablet 5    amLODIPine (NORVASC) 5 MG tablet TAKE ONE TABLET BY MOUTH DAILY 30 tablet 5    metoprolol succinate (TOPROL XL) 100 MG extended release tablet TAKE ONE TABLET BY MOUTH DAILY, TAKE WITH 25MG DOSE FOR A TOTAL OF 125MG DAILY 30 tablet 3    metoprolol succinate (TOPROL XL) 25 MG extended release tablet TAKE ONE TABLET BY MOUTH DAILY 30 tablet 3    metFORMIN (GLUCOPHAGE) 500 MG tablet TAKE ONE TABLET BY MOUTH TWICE A DAY WITH MEALS 60 tablet 5    dorzolamide-timolol 22.3-6.8 MG/ML SOLN Apply 1 drop to eye 2 times daily USE DROPS ON RIGHT EYE ONLY BID.  sertraline (ZOLOFT) 100 MG tablet Take 1.5 tablets by mouth nightly 135 tablet 1    aspirin 81 MG tablet Take 1 tablet by mouth daily 90 tablet 1        Medications patient taking as of now reconciled against medications ordered at time of hospital discharge: Yes    Chief Complaint   Patient presents with    Follow-Up from Hospital    Pharyngitis    Cough    Nasal Congestion       HPI   Patient present via phone call with her  for hospital follow up. She was admitted to [facility] for a couple weeks and her  did not feel she had improved at all. She was just started on zyprexa nightly by her psychiatrist. He states that just started. Patient was treated for a UtI prior to her discharge and she seemed to be feeling better. Her vitals today are listed below. Pulse 69-70  128/68 BP. Oxygen was 94%. She also has a sore throat and runny nose and cough that has been going on for the past 3 days. They have coricidin but patient states she is not sure if she can take it. She has home care from [facility]. She has a nurse and aide and PT. Her  states things are going well but they might not need to continue with the aide   At home with home care [facility]. Nurse is supposed to come out after this visit. PT coming after that. Potassium was normal on 12-29 but magnesium was a little low. Will need to recheck blood work. Inpatient course: Discharge summary reviewed- see chart. Interval history/Current status: stable    Review of Systems   Constitutional: Negative. HENT: Negative. Respiratory: Negative. Cardiovascular: Negative. Gastrointestinal: Negative. Neurological: Negative. Psychiatric/Behavioral: The patient is nervous/anxious. There were no vitals filed for this visit. There is no height or weight on file to calculate BMI. Wt Readings from Last 3 Encounters:   12/17/21 180 lb 6.4 oz (81.8 kg)   12/16/21 191 lb (86.6 kg)   11/12/21 191 lb (86.6 kg)     BP Readings from Last 3 Encounters:   12/30/21 (!) 96/57   12/16/21 (!) 150/65   11/12/21 128/82       Physical Exam  This was a phone call visit. Assessment/Plan:  1. Hospital discharge follow-up  - MD DISCHARGE MEDS RECONCILED W/ CURRENT OUTPATIENT MED LIST    2. Anxiety  Seems stable, will continue with follow ups with Dr. Lance Gomez. 3. Low magnesium level  Will supplement with magnesium daily and recheck labs in 1 month. - Basic Metabolic Panel; Future  - Magnesium;  Future        Medical Decision Making: moderate complexity

## 2022-01-05 ENCOUNTER — TELEPHONE (OUTPATIENT)
Dept: FAMILY MEDICINE CLINIC | Age: 77
End: 2022-01-05

## 2022-01-05 RX ORDER — MAGNESIUM 200 MG
200 TABLET ORAL DAILY
Qty: 30 TABLET | Refills: 3 | Status: SHIPPED | OUTPATIENT
Start: 2022-01-05 | End: 2022-04-06 | Stop reason: SDUPTHER

## 2022-01-05 NOTE — TELEPHONE ENCOUNTER
Pt's  is requesting Medication be RE-Sent to Pharmacy. Pharmacy states they have not recevied it. Last Seen 1/4/22.     magnesium 200 MG TABS tablet     [address] [facility]

## 2022-01-10 ENCOUNTER — TELEPHONE (OUTPATIENT)
Dept: FAMILY MEDICINE CLINIC | Age: 77
End: 2022-01-10

## 2022-01-10 RX ORDER — MAGNESIUM 200 MG
200 TABLET ORAL DAILY
Qty: 30 TABLET | Refills: 3 | Status: CANCELLED | OUTPATIENT
Start: 2022-01-10

## 2022-01-10 NOTE — TELEPHONE ENCOUNTER
Tried to call pt - Phone rang and then disconnected.      I contacted pt's  and informed him of Preethi's message

## 2022-01-10 NOTE — TELEPHONE ENCOUNTER
----- Message from Rei Shepard sent at 1/10/2022  9:26 AM EST -----  Subject: Medication Problem    QUESTIONS  Name of Medication? magnesium 200 MG TABS tablet  Patient-reported dosage and instructions? Take 1 tablet by mouth daily  What question or problem do you have with the medication? PATIENT    CALLED IN REGARDS OF PHARMACY OR WAREHOUSE DOESN'T PROVIDE MEDICATION,   WHAT FORM DOES IT COME IN TO BY OTC  Preferred Pharmacy? [address] [place] [address] [address] [facility] [address]   [address] [place] [address] [phone]  Pharmacy phone number (if available)? [phone]  Additional Information for Provider?   ---------------------------------------------------------------------------  --------------  CALL BACK INFO  What is the best way for the office to contact you? OK to leave message on   voicemail  Preferred Call Back Phone Number? [phone]  ---------------------------------------------------------------------------  --------------  SCRIPT ANSWERS  Relationship to Patient? Third Party  Representative Name?  Shady Bermudez

## 2022-01-12 ENCOUNTER — TELEPHONE (OUTPATIENT)
Dept: FAMILY MEDICINE CLINIC | Age: 77
End: 2022-01-12

## 2022-01-18 ENCOUNTER — TELEPHONE (OUTPATIENT)
Dept: FAMILY MEDICINE CLINIC | Age: 77
End: 2022-01-18

## 2022-01-18 DIAGNOSIS — N39.0 RECURRENT UTI: Primary | ICD-10-CM

## 2022-01-18 NOTE — TELEPHONE ENCOUNTER
Dina from [facility] is needing the orders for 1-4-2022 sign and faxed back to [facility]

## 2022-01-18 NOTE — TELEPHONE ENCOUNTER
----- Message from Derek Parks sent at 1/18/2022  3:23 PM EST -----  Subject: Message to Provider    QUESTIONS  Information for Provider? patient would like to add a UA and urine culture   to her labs to make sure she doesn't have a UTI. She will be having her   labs done tomorrow. Thanks  ---------------------------------------------------------------------------  --------------  Lear How INFO  What is the best way for the office to contact you? OK to leave message on   voicemail  Preferred Call Back Phone Number? [phone]  ---------------------------------------------------------------------------  --------------  SCRIPT ANSWERS  Relationship to Patient?  Self

## 2022-02-01 DIAGNOSIS — I10 ESSENTIAL HYPERTENSION: ICD-10-CM

## 2022-02-01 RX ORDER — METOPROLOL SUCCINATE 100 MG/1
TABLET, EXTENDED RELEASE ORAL
Qty: 30 TABLET | Refills: 3 | Status: SHIPPED | OUTPATIENT
Start: 2022-02-01 | End: 2022-04-06 | Stop reason: SDUPTHER

## 2022-02-01 NOTE — TELEPHONE ENCOUNTER
Awais olivarez refill for patient on  Metoprolol Succ ER 25mg tab  Last visit 1/4/22  No future  christiano hummel

## 2022-02-03 DIAGNOSIS — R73.03 PREDIABETES: ICD-10-CM

## 2022-02-08 ENCOUNTER — HOSPITAL ENCOUNTER (OUTPATIENT)
Age: 77
Discharge: HOME | End: 2022-02-08
Payer: MEDICARE

## 2022-02-08 DIAGNOSIS — R79.0 LOW MAGNESIUM LEVEL: ICD-10-CM

## 2022-02-08 DIAGNOSIS — N39.0 RECURRENT UTI: ICD-10-CM

## 2022-02-08 LAB
ANION GAP SERPL CALCULATED.3IONS-SCNC: 14 MMOL/L (ref 3–16)
BILIRUBIN URINE: NEGATIVE
BLOOD, URINE: NEGATIVE
BUN BLDV-MCNC: 24 MG/DL (ref 7–20)
CALCIUM SERPL-MCNC: 10.2 MG/DL (ref 8.3–10.6)
CATARACTS: NEGATIVE
CHLORIDE BLD-SCNC: 101 MMOL/L (ref 99–110)
CLARITY: CLEAR
CO2: 27 MMOL/L (ref 21–32)
COLOR: YELLOW
CREAT SERPL-MCNC: 0.7 MG/DL (ref 0.6–1.2)
DIABETIC RETINOPATHY: NEGATIVE
GFR AFRICAN AMERICAN: >60
GFR NON-AFRICAN AMERICAN: >60
GLAUCOMA: POSITIVE
GLUCOSE BLD-MCNC: 112 MG/DL (ref 70–99)
GLUCOSE URINE: NEGATIVE MG/DL
INTRAOCULAR PRESSURE EYE: NORMAL
KETONES, URINE: NEGATIVE MG/DL
LEUKOCYTE ESTERASE, URINE: NEGATIVE
MAGNESIUM: 1.8 MG/DL (ref 1.8–2.4)
MICROSCOPIC EXAMINATION: NORMAL
NITRITE, URINE: NEGATIVE
PH UA: 7 (ref 5–8)
POTASSIUM SERPL-SCNC: 3.9 MMOL/L (ref 3.5–5.1)
PROTEIN UA: NEGATIVE MG/DL
SODIUM BLD-SCNC: 142 MMOL/L (ref 136–145)
SPECIFIC GRAVITY UA: 1.01 (ref 1–1.03)
URINE TYPE: NORMAL
UROBILINOGEN, URINE: 0.2 E.U./DL
VISUAL ACUITY DISTANCE LEFT EYE: NORMAL
VISUAL ACUITY DISTANCE RIGHT EYE: NORMAL

## 2022-02-08 PROCEDURE — 83735 ASSAY OF MAGNESIUM: CPT

## 2022-02-08 PROCEDURE — 36415 COLL VENOUS BLD VENIPUNCTURE: CPT

## 2022-02-08 PROCEDURE — 87086 URINE CULTURE/COLONY COUNT: CPT

## 2022-02-08 PROCEDURE — 80048 BASIC METABOLIC PNL TOTAL CA: CPT

## 2022-02-08 PROCEDURE — 81003 URINALYSIS AUTO W/O SCOPE: CPT

## 2022-02-09 DIAGNOSIS — I10 ESSENTIAL HYPERTENSION: ICD-10-CM

## 2022-02-09 LAB — URINE CULTURE, ROUTINE: NORMAL

## 2022-02-09 RX ORDER — METOPROLOL SUCCINATE 25 MG/1
TABLET, EXTENDED RELEASE ORAL
Qty: 30 TABLET | Refills: 3 | Status: SHIPPED
Start: 2022-02-09 | End: 2022-04-06 | Stop reason: DRUGHIGH

## 2022-02-09 NOTE — TELEPHONE ENCOUNTER
Last Office Visit  -  1-4-2022  Next Office Visit  -      Last Filled  -    Last UDS -    Contract -      Pt stated that metprolol 100 mg was sent to pharmacy it should have been metoprolol 25 mg     [address] [facility] ctr she is completely out

## 2022-03-01 ENCOUNTER — TELEPHONE (OUTPATIENT)
Dept: FAMILY MEDICINE CLINIC | Age: 77
End: 2022-03-01

## 2022-03-01 NOTE — TELEPHONE ENCOUNTER
Received call from patient stating her psychiatrist Dr Sneha Roblero would like patient to have a MD as a primary doctor so he can consult \" Doctor to doctor\" . He is aware Jethro Webber is her PCP.  Advise

## 2022-03-01 NOTE — TELEPHONE ENCOUNTER
If she wants a MD for her PCP then she needs to transfer care to [facility] that has MD's accepting new patients.

## 2022-03-07 ENCOUNTER — HOSPITAL ENCOUNTER (OUTPATIENT)
Dept: WOMENS IMAGING | Age: 77
Discharge: HOME | End: 2022-03-07
Payer: MEDICARE

## 2022-03-07 VITALS — BODY MASS INDEX: 40.05 KG/M2 | HEIGHT: 60 IN | WEIGHT: 204 LBS

## 2022-03-07 DIAGNOSIS — N95.1 SYMPTOMATIC MENOPAUSAL OR FEMALE CLIMACTERIC STATES: ICD-10-CM

## 2022-03-07 DIAGNOSIS — Z12.31 VISIT FOR SCREENING MAMMOGRAM: ICD-10-CM

## 2022-03-07 PROCEDURE — 77067 SCR MAMMO BI INCL CAD: CPT

## 2022-03-07 PROCEDURE — 77080 DXA BONE DENSITY AXIAL: CPT

## 2022-03-10 ENCOUNTER — HOSPITAL ENCOUNTER (OUTPATIENT)
Dept: WOMENS IMAGING | Age: 77
End: 2022-03-10
Payer: MEDICARE

## 2022-03-10 ENCOUNTER — HOSPITAL ENCOUNTER (OUTPATIENT)
Dept: WOMENS IMAGING | Age: 77
Discharge: HOME | End: 2022-03-10
Payer: MEDICARE

## 2022-03-10 DIAGNOSIS — R92.8 ABNORMAL MAMMOGRAM: ICD-10-CM

## 2022-03-10 PROCEDURE — G0279 TOMOSYNTHESIS, MAMMO: HCPCS

## 2022-04-06 ENCOUNTER — OFFICE VISIT (OUTPATIENT)
Dept: INTERNAL MEDICINE CLINIC | Age: 77
End: 2022-04-06
Payer: MEDICARE

## 2022-04-06 VITALS
DIASTOLIC BLOOD PRESSURE: 78 MMHG | SYSTOLIC BLOOD PRESSURE: 160 MMHG | BODY MASS INDEX: 40.25 KG/M2 | OXYGEN SATURATION: 94 % | HEIGHT: 60 IN | WEIGHT: 205 LBS | HEART RATE: 74 BPM

## 2022-04-06 DIAGNOSIS — I10 ESSENTIAL HYPERTENSION: Primary | ICD-10-CM

## 2022-04-06 DIAGNOSIS — E66.01 OBESITY, CLASS III, BMI 40-49.9 (MORBID OBESITY) (HCC): ICD-10-CM

## 2022-04-06 DIAGNOSIS — F41.9 ANXIETY AND DEPRESSION: ICD-10-CM

## 2022-04-06 DIAGNOSIS — E78.49 OTHER HYPERLIPIDEMIA: ICD-10-CM

## 2022-04-06 DIAGNOSIS — E83.42 HYPOMAGNESEMIA: ICD-10-CM

## 2022-04-06 DIAGNOSIS — R73.03 PREDIABETES: ICD-10-CM

## 2022-04-06 DIAGNOSIS — F32.A ANXIETY AND DEPRESSION: ICD-10-CM

## 2022-04-06 PROBLEM — F32.9 MAJOR DEPRESSION, SINGLE EPISODE: Status: RESOLVED | Noted: 2021-12-16 | Resolved: 2022-04-06

## 2022-04-06 PROBLEM — F33.1 MAJOR DEPRESSIVE DISORDER, RECURRENT, MODERATE (HCC): Status: RESOLVED | Noted: 2021-03-12 | Resolved: 2022-04-06

## 2022-04-06 PROCEDURE — G8427 DOCREV CUR MEDS BY ELIG CLIN: HCPCS | Performed by: INTERNAL MEDICINE

## 2022-04-06 PROCEDURE — 1123F ACP DISCUSS/DSCN MKR DOCD: CPT | Performed by: INTERNAL MEDICINE

## 2022-04-06 PROCEDURE — 1036F TOBACCO NON-USER: CPT | Performed by: INTERNAL MEDICINE

## 2022-04-06 PROCEDURE — 4040F PNEUMOC VAC/ADMIN/RCVD: CPT | Performed by: INTERNAL MEDICINE

## 2022-04-06 PROCEDURE — 99204 OFFICE O/P NEW MOD 45 MIN: CPT | Performed by: INTERNAL MEDICINE

## 2022-04-06 PROCEDURE — G8399 PT W/DXA RESULTS DOCUMENT: HCPCS | Performed by: INTERNAL MEDICINE

## 2022-04-06 PROCEDURE — 1090F PRES/ABSN URINE INCON ASSESS: CPT | Performed by: INTERNAL MEDICINE

## 2022-04-06 PROCEDURE — G8417 CALC BMI ABV UP PARAM F/U: HCPCS | Performed by: INTERNAL MEDICINE

## 2022-04-06 RX ORDER — LOSARTAN POTASSIUM AND HYDROCHLOROTHIAZIDE 25; 100 MG/1; MG/1
1 TABLET ORAL DAILY
Qty: 30 TABLET | Refills: 11 | Status: SHIPPED | OUTPATIENT
Start: 2022-04-06

## 2022-04-06 RX ORDER — ATORVASTATIN CALCIUM 20 MG/1
20 TABLET, FILM COATED ORAL DAILY
Qty: 30 TABLET | Refills: 11 | Status: SHIPPED | OUTPATIENT
Start: 2022-04-06

## 2022-04-06 RX ORDER — METOPROLOL SUCCINATE 100 MG/1
150 TABLET, EXTENDED RELEASE ORAL DAILY
Qty: 45 TABLET | Refills: 0 | Status: SHIPPED
Start: 2022-04-06 | End: 2022-05-02 | Stop reason: DRUGHIGH

## 2022-04-06 RX ORDER — MAGNESIUM 200 MG
200 TABLET ORAL DAILY
Qty: 30 TABLET | Refills: 11 | Status: SHIPPED | OUTPATIENT
Start: 2022-04-06

## 2022-04-06 NOTE — PROGRESS NOTES
[facility] Primary Care  History and Physical  Ruel Pinzon M.D. Amaya Gupta  YOB: 1945    Date of Service:  4/6/2022    Chief Complaint:   Amaya Gupta is a 67 y.o. female who presents for   Chief Complaint   Patient presents with   [employer] Establish Care    Leg Swelling    Diabetes    Hypertension       Assessment/Plan:    Barbara Yang was seen today for establish care. Diagnoses and all orders for this visit:    Essential hypertension  Increase metoprolol succinate (TOPROL XL) 100 MG extended release tablet; Take 1.5 tablets by mouth daily  -     losartan-hydroCHLOROthiazide (HYZAAR) 100-25 MG per tablet; Take 1 tablet by mouth daily  -     CBC with Auto Differential; Future  If blood pressure gets too low, can discontinue amlodipine    Prediabetes  -     POCT glycosylated hemoglobin (Hb A1C); Future  Discontinue metformin and monitor sugar    Other hyperlipidemia  -     atorvastatin (LIPITOR) 20 MG tablet; Take 1 tablet by mouth daily  -     Lipid Panel; Future    Hypomagnesemia  -     magnesium 200 MG TABS tablet; Take 1 tablet by mouth daily    Anxiety and depression  Stable per psych    Obesity, Class III, BMI 40-49.9 (morbid obesity) (HCC)  Goals:   -Eat small amounts of food 4-5 times daily  -Avoid high fat and high sugar foods  -Include protein with all meals and snacks  -Avoid carbonation and caffeine  -Avoid calorie containing beverages  -Increase physical activity as tolerated      Return May 2 at 10:50 Fasting Cholesterol, BP and poct HgA1C off metformin. HPI: Here for Annual Physical and Follow up. She complains of bilateral leg edema after a long flight a month ago and afterwards had intermittent leg edema. No swelling in the past week.     Treatment Adherence:   Medication compliance:  compliant most of the time  Diet compliance:  compliant most of the time  Weight trend: stable  Current exercise: walks 5 time(s) per week  Barriers: none    Diabetes Mellitus Type 2: Current symptoms/problems include none. Stable Metformon 500 mg bid    Home blood sugar records: fasting range: 120  Any episodes of hypoglycemia? no  Eye exam current (within one year): yes  Tobacco history: She  reports that she quit smoking about 54 years ago. Her smoking use included cigarettes. She has a 0.50 pack-year smoking history. She has never used smokeless tobacco.   Daily Aspirin? Yes  Known diabetic complications: none    Hyperlipidemia:  No new myalgias or GI upset on atorvastatin (Lipitor) 20 mg qd. Anxiety/depression:  Stable on remeron 7.5 mg evening, zyprexa 5 mg evening and zoloft 100 mg 1 1/2 daily per Dr. Betty Altman. Hypertension:  Home blood pressure monitoring: Yes - 128/78 on Hyzaar qAM, Toprol  mg qam and Amlodipine 5 mg qd. She is adherent to a low sodium diet. Patient denies chest pain, shortness of breath, headache, lightheadedness, blurred vision, palpitations, dry cough and fatigue. Antihypertensive medication side effects: no medication side effects noted. Use of agents associated with hypertension: none.                                            Lab Results   Component Value Date    LABA1C 5.7 11/04/2021    LABA1C 6.3 11/13/2020    LABA1C 6.2 05/08/2020    LABMICR Not Indicated 02/08/2022     Lab Results   Component Value Date     02/08/2022    K 3.9 02/08/2022     02/08/2022    CO2 27 02/08/2022    BUN 24 (H) 02/08/2022    CREATININE 0.7 02/08/2022    GLUCOSE 112 (H) 02/08/2022    CALCIUM 10.2 02/08/2022     Lab Results   Component Value Date    CHOL 142 05/08/2020    TRIG 97 05/08/2020    HDL 63 05/08/2020    HDL 66 02/03/2012    LDLCALC 60 05/08/2020     Lab Results   Component Value Date    ALT 15 12/16/2021    AST 22 12/16/2021     Lab Results   Component Value Date    TSH 1.24 08/09/2012    TSH 1.15 07/15/2010     Lab Results   Component Value Date    WBC 5.6 12/16/2021    HGB 14.3 12/16/2021    HCT 41.9 12/16/2021    MCV 90.0 12/16/2021    PLT 218 12/16/2021     Lab Results   Component Value Date    INR 0.95 09/14/2016    INR 0.95 08/30/2016      No results found for: PSA   No results found for: LABURIC     Wt Readings from Last 3 Encounters:   04/06/22 205 lb (93 kg)   03/07/22 204 lb (92.5 kg)   12/16/21 191 lb (86.6 kg)     BP Readings from Last 3 Encounters:   04/06/22 (!) 160/78   12/16/21 (!) 150/65   11/12/21 128/82       Patient Active Problem List   Diagnosis    HTN (hypertension)    Fibromyalgia    Prediabetes    Primary localized osteoarthrosis, lower leg    History of total knee arthroplasty, bilateral    Glaucoma    Sebaceous cyst of skin of right breast    TANIKA (stress urinary incontinence, female)    Morbidly obese ([address]    Major depressive disorder, recurrent, moderate (HCC)    Type 2 diabetes mellitus without complication, without long-term current use of insulin (HCC)    Anxiety    Panic attack    Major depression, single episode    Hypokalemia    Hypomagnesemia    Asymptomatic bacteriuria    Generalized anxiety disorder       Allergies   Allergen Reactions    Norco [Hydrocodone-Acetaminophen] Nausea Only    Vanilla     Ace Inhibitors Rash     Coughing and itching    Tape [Adhesive Tape] Rash     Outpatient Medications Marked as Taking for the 4/6/22 encounter (Office Visit) with Miguel Richard MD   Medication Sig Dispense Refill    metoprolol succinate (TOPROL XL) 25 MG extended release tablet TAKE ONE TABLET BY MOUTH DAILY 30 tablet 3    metFORMIN (GLUCOPHAGE) 500 MG tablet TAKE ONE TABLET BY MOUTH TWICE A DAY WITH MEALS 60 tablet 5    metoprolol succinate (TOPROL XL) 100 MG extended release tablet TAKE ONE TABLET BY MOUTH DAILY, TAKE WITH 25MG DOSE FOR A TOTAL OF 125MG DAILY 30 tablet 3    magnesium 200 MG TABS tablet Take 1 tablet by mouth daily 30 tablet 3    atorvastatin (LIPITOR) 20 MG tablet TAKE ONE TABLET BY MOUTH DAILY 30 tablet 5    LORazepam (ATIVAN) 0.5 MG tablet Take 0.5 mg by mouth 2 times daily.  mirtazapine (REMERON) 7.5 MG tablet Take 1 tablet by mouth nightly 30 tablet 0    OLANZapine (ZYPREXA) 2.5 MG tablet Take 1 tablet by mouth nightly (Patient taking differently: Take 5 mg by mouth nightly ) 30 tablet 0    latanoprost (XALATAN) 0.005 % ophthalmic solution Place 1 drop into both eyes nightly      losartan-hydroCHLOROthiazide (HYZAAR) 100-25 MG per tablet TAKE ONE TABLET BY MOUTH DAILY 30 tablet 5    amLODIPine (NORVASC) 5 MG tablet TAKE ONE TABLET BY MOUTH DAILY 30 tablet 5    dorzolamide-timolol 22.3-6.8 MG/ML SOLN Apply 1 drop to eye 2 times daily USE DROPS ON RIGHT EYE ONLY BID.       sertraline (ZOLOFT) 100 MG tablet Take 1.5 tablets by mouth nightly 135 tablet 1    aspirin 81 MG tablet Take 1 tablet by mouth daily 90 tablet 1       Past Medical History:   Diagnosis Date    Anxiety     Arthritis     [facility]     endometria    Depression     Diabetes mellitus ([facility]     pre diabetic but take medications    Diverticulitis of colon 12/2012    possible    Fibromyalgia     Glaucoma     Heart murmur     Hyperlipidemia     not currently taking meds    Hypertension     Occult blood in stools     Osteopenia     Prediabetes 2/20/2013    Urinary incontinence      Past Surgical History:   Procedure Laterality Date    CATARACT REMOVAL WITH IMPLANT Left t    COLONOSCOPY  12/03/02 01/18/13    hemorrhoids, diverticulosis    COLONOSCOPY  2/26/16    normal    CYST REMOVAL Right 08/24/2018    removal rifh chest wall sebacious cyst    CYSTOSCOPY N/A 02/15/2019    RIGID CYSTOSCOPY, MID-URETHRAL SLING PLACEMENT     DILATION AND CURETTAGE OF UTERUS      EYE SURGERY Right 8/16/13    phaco with IOL    EYE SURGERY Left 2013    cataract removal with implant    HEMORRHOID SURGERY      HYSTERECTOMY      JOINT REPLACEMENT Left 2010    knee    JOINT REPLACEMENT Right 09/14/2016    KNEE ARTHROPLASTY      LT    KNEE SURGERY      CO EXC SKIN BENIG <5MM REMAINDR BODY N/A 8/24/2018 REMOVAL OF SEBACEOUS CYST - CHEST WALL performed by Marcos Guy MD at [facility] N/A 2/15/2019    RIGID CYSTOSCOPY, MID-URETHRAL SLING PLACEMENT performed by Kayley Toney MD at [facility]  16    WISDOM TOOTH EXTRACTION       Family History   Problem Relation Age of Onset    Cancer Mother         Uterine    High Blood Pressure Mother     Cancer Father         Lung    High Blood Pressure Father     High Blood Pressure Sister     Diabetes Brother     Heart Disease Brother     High Blood Pressure Brother     High Blood Pressure Sister     Breast Cancer Sister     No Known Problems Sister     Heart Disease Brother      Social History     Socioeconomic History    Marital status:      Spouse name: Not on file    Number of children: 3    Years of education: Not on file    Highest education level: Not on file   Occupational History    Not on file   Tobacco Use    Smoking status: Former Smoker     Packs/day: 0.25     Years: 2.00     Pack years: 0.50     Types: Cigarettes     Quit date: 1968     Years since quittin.2    Smokeless tobacco: Never Used   Vaping Use    Vaping Use: Never used   Substance and Sexual Activity    Alcohol use: No     Alcohol/week: 0.0 standard drinks    Drug use: No    Sexual activity: Not on file   Other Topics Concern    Not on file   Social History Narrative    Not on file     Social Determinants of Health     Financial Resource Strain:     Difficulty of Paying Living Expenses: Not on file   Food Insecurity:     Worried About Running Out of Food in the Last Year: Not on file    Alvaro of Food in the Last Year: Not on file   Transportation Needs:     Lack of Transportation (Medical): Not on file    Lack of Transportation (Non-Medical):  Not on file   Physical Activity:     Days of Exercise per Week: Not on file    Minutes of Exercise per Session: Not on file   Stress:     Feeling of Stress : Not on file   Social Connections:     Frequency of Communication with Friends and Family: Not on file    Frequency of Social Gatherings with Friends and Family: Not on file    Attends Rastafari Services: Not on file    Active Member of Clubs or Organizations: Not on file    Attends Club or Organization Meetings: Not on file    Marital Status: Not on file   Intimate Partner Violence:     Fear of Current or Ex-Partner: Not on file    Emotionally Abused: Not on file    Physically Abused: Not on file    Sexually Abused: Not on file   Housing Stability:     Unable to Pay for Housing in the Last Year: Not on file    Number of [place] in the Last Year: Not on file    Unstable Housing in the Last Year: Not on file       Review of Systems:  A comprehensive review of systems was negative except for what was noted in the HPI. Physical Exam:   Vitals:    04/06/22 1114   BP: (!) 160/78   Pulse: 74   SpO2: 94%   Weight: 205 lb (93 kg)   Height: 5' (1.524 m)     Body mass index is 40.04 kg/m². Constitutional: She is oriented to person, place, and time. She appears well-developed and well-nourished. No distress. HEENT:   Head: Normocephalic and atraumatic. Right Ear: Tympanic membrane, external ear and ear canal normal.   Left Ear: Tympanic membrane, external ear and ear canal normal.   Mouth/Throat: Oropharynx is clear and moist, and mucous membranes are normal.  There is no cervical adenopathy. Eyes: Conjunctivae and extraocular motions are normal. Pupils are equal, round, and reactive to light. Neck: Supple. No JVD present. Carotid bruit is not present. No mass and no thyromegaly present. Cardiovascular: Normal rate, regular rhythm, normal heart sounds and intact distal pulses. Pulmonary/Chest: Effort normal and breath sounds normal. No respiratory distress. She has no wheezes, rhonchi or rales. Abdominal: Soft, non-tender.  Bowel sounds and aorta are normal. She exhibits no organomegaly, mass or bruit. Musculoskeletal: Normal range of motion, no synovitis. She exhibits no edema. Neurological: She is alert and oriented to person, place, and time. She has normal reflexes. No cranial nerve deficit. Coordination normal.   Skin: Skin is warm and dry. There is no rash or erythema. No suspicious lesions noted.        Preventive Care:  Health Maintenance   Topic Date Due    Hepatitis C screen  Never done    Lipid screen  05/08/2021    COVID-19 Vaccine (3 - Booster for Moderna series) 07/24/2021    Depression Monitoring  11/18/2022    Annual Wellness Visit (AWV)  11/19/2022    Potassium monitoring  02/08/2023    Creatinine monitoring  02/08/2023    DTaP/Tdap/Td vaccine (3 - Td or Tdap) 06/13/2028    DEXA (modify frequency per FRAX score)  Completed    Flu vaccine  Completed    Shingles Vaccine  Completed    Pneumococcal 65+ years Vaccine  Completed    Hepatitis A vaccine  Aged Out    Hib vaccine  Aged Out    Meningococcal (ACWY) vaccine  Aged Out        Recommendations for [employer] Due: see orders and patient instructions/AVS.

## 2022-04-25 ENCOUNTER — HOSPITAL ENCOUNTER (OUTPATIENT)
Age: 77
Discharge: HOME | End: 2022-04-25
Payer: MEDICARE

## 2022-04-25 DIAGNOSIS — E78.49 OTHER HYPERLIPIDEMIA: ICD-10-CM

## 2022-04-25 DIAGNOSIS — I10 ESSENTIAL HYPERTENSION: ICD-10-CM

## 2022-04-25 LAB
BASOPHILS ABSOLUTE: 0.1 K/UL (ref 0–0.2)
BASOPHILS RELATIVE PERCENT: 1.1 %
CHOLESTEROL, TOTAL: 153 MG/DL (ref 0–199)
EOSINOPHILS ABSOLUTE: 0.2 K/UL (ref 0–0.6)
EOSINOPHILS RELATIVE PERCENT: 3.3 %
HCT VFR BLD CALC: 38.1 % (ref 36–48)
HDLC SERPL-MCNC: 65 MG/DL (ref 40–60)
HEMOGLOBIN: 12.5 G/DL (ref 12–16)
LDL CHOLESTEROL CALCULATED: 75 MG/DL
LYMPHOCYTES ABSOLUTE: 1.4 K/UL (ref 1–5.1)
LYMPHOCYTES RELATIVE PERCENT: 27.8 %
MCH RBC QN AUTO: 29.9 PG (ref 26–34)
MCHC RBC AUTO-ENTMCNC: 32.9 G/DL (ref 31–36)
MCV RBC AUTO: 90.9 FL (ref 80–100)
MONOCYTES ABSOLUTE: 0.3 K/UL (ref 0–1.3)
MONOCYTES RELATIVE PERCENT: 7 %
NEUTROPHILS ABSOLUTE: 3 K/UL (ref 1.7–7.7)
NEUTROPHILS RELATIVE PERCENT: 60.8 %
PDW BLD-RTO: 13.7 % (ref 12.4–15.4)
PLATELET # BLD: 241 K/UL (ref 135–450)
PMV BLD AUTO: 8 FL (ref 5–10.5)
RBC # BLD: 4.19 M/UL (ref 4–5.2)
TRIGL SERPL-MCNC: 65 MG/DL (ref 0–150)
VLDLC SERPL CALC-MCNC: 13 MG/DL
WBC # BLD: 4.9 K/UL (ref 4–11)

## 2022-04-25 PROCEDURE — 36415 COLL VENOUS BLD VENIPUNCTURE: CPT

## 2022-04-25 PROCEDURE — 80061 LIPID PANEL: CPT

## 2022-04-25 PROCEDURE — 85025 COMPLETE CBC W/AUTO DIFF WBC: CPT

## 2022-05-02 ENCOUNTER — OFFICE VISIT (OUTPATIENT)
Dept: INTERNAL MEDICINE CLINIC | Age: 77
End: 2022-05-02
Payer: MEDICARE

## 2022-05-02 VITALS
WEIGHT: 212 LBS | BODY MASS INDEX: 41.62 KG/M2 | HEART RATE: 72 BPM | SYSTOLIC BLOOD PRESSURE: 132 MMHG | DIASTOLIC BLOOD PRESSURE: 78 MMHG | OXYGEN SATURATION: 96 % | HEIGHT: 60 IN

## 2022-05-02 DIAGNOSIS — I10 ESSENTIAL HYPERTENSION: Primary | ICD-10-CM

## 2022-05-02 DIAGNOSIS — R73.03 PREDIABETES: ICD-10-CM

## 2022-05-02 PROBLEM — E11.9 TYPE 2 DIABETES MELLITUS WITHOUT COMPLICATION, WITHOUT LONG-TERM CURRENT USE OF INSULIN (HCC): Status: RESOLVED | Noted: 2021-03-12 | Resolved: 2022-05-02

## 2022-05-02 LAB — HBA1C MFR BLD: 5.9 %

## 2022-05-02 PROCEDURE — 83036 HEMOGLOBIN GLYCOSYLATED A1C: CPT | Performed by: INTERNAL MEDICINE

## 2022-05-02 PROCEDURE — 99213 OFFICE O/P EST LOW 20 MIN: CPT | Performed by: INTERNAL MEDICINE

## 2022-05-02 PROCEDURE — 1123F ACP DISCUSS/DSCN MKR DOCD: CPT | Performed by: INTERNAL MEDICINE

## 2022-05-02 PROCEDURE — 1090F PRES/ABSN URINE INCON ASSESS: CPT | Performed by: INTERNAL MEDICINE

## 2022-05-02 PROCEDURE — G8417 CALC BMI ABV UP PARAM F/U: HCPCS | Performed by: INTERNAL MEDICINE

## 2022-05-02 PROCEDURE — G8427 DOCREV CUR MEDS BY ELIG CLIN: HCPCS | Performed by: INTERNAL MEDICINE

## 2022-05-02 PROCEDURE — G8399 PT W/DXA RESULTS DOCUMENT: HCPCS | Performed by: INTERNAL MEDICINE

## 2022-05-02 PROCEDURE — 1036F TOBACCO NON-USER: CPT | Performed by: INTERNAL MEDICINE

## 2022-05-02 PROCEDURE — 4040F PNEUMOC VAC/ADMIN/RCVD: CPT | Performed by: INTERNAL MEDICINE

## 2022-05-02 RX ORDER — METOPROLOL SUCCINATE 200 MG/1
200 TABLET, EXTENDED RELEASE ORAL DAILY
Qty: 30 TABLET | Refills: 0 | Status: SHIPPED | OUTPATIENT
Start: 2022-05-02 | End: 2022-06-01 | Stop reason: SDUPTHER

## 2022-05-02 SDOH — ECONOMIC STABILITY: FOOD INSECURITY: WITHIN THE PAST 12 MONTHS, YOU WORRIED THAT YOUR FOOD WOULD RUN OUT BEFORE YOU GOT MONEY TO BUY MORE.: NEVER TRUE

## 2022-05-02 SDOH — ECONOMIC STABILITY: FOOD INSECURITY: WITHIN THE PAST 12 MONTHS, THE FOOD YOU BOUGHT JUST DIDN'T LAST AND YOU DIDN'T HAVE MONEY TO GET MORE.: NEVER TRUE

## 2022-05-02 ASSESSMENT — SOCIAL DETERMINANTS OF HEALTH (SDOH): HOW HARD IS IT FOR YOU TO PAY FOR THE VERY BASICS LIKE FOOD, HOUSING, MEDICAL CARE, AND HEATING?: NOT HARD AT ALL

## 2022-05-02 NOTE — PROGRESS NOTES
Dennie Hausen  YOB: 1945    Date of Service:  5/2/2022    Chief Complaint:      Chief Complaint   Patient presents with    Hypertension    Diabetes       Assessment/Plan:  Diagnoses and all orders for this visit:    Essential hypertension  Increase  metoprolol succinate (TOPROL XL) 200 MG extended release tablet; Take 1 tablet by mouth daily  Discontinue amlodipine 5 mg daily    Prediabetes  -     POCT glycosylated hemoglobin (Hb A1C)  Discontinue metformin since no longer a diabetic    Return < 1 month on higher dose BP med. HPI:  Dennie Hausen is a 65 y.o. She complains of bilateral leg edema after a long flight a month ago and afterwards had intermittent leg edema. No swelling in the past week.     Treatment Adherence:   Medication compliance:  compliant most of the time  Diet compliance:  compliant most of the time  Weight trend: stable  Current exercise: walks 5 time(s) per week  Barriers: none     Diabetes Mellitus Type 2: Current symptoms/problems include none. Stable off Metformon 500 mg twice a day for a month.     Home blood sugar records: fasting range: 103-136  Any episodes of hypoglycemia? no  Eye exam current (within one year): yes  Tobacco history: She  reports that she quit smoking about 54 years ago. Her smoking use included cigarettes. She has a 0.50 pack-year smoking history. She has never used smokeless tobacco.   Daily Aspirin? Yes  Known diabetic complications: none     Hyperlipidemia:  No new myalgias or GI upset on atorvastatin (Lipitor) 20 mg qd. Anxiety/depression:  Stable on remeron 7.5 mg evening, zyprexa 5 mg evening and zoloft 100 mg 1 1/2 daily per Dr. Maxime Ochoa.     Hypertension:  Home blood pressure monitoring: Yes - 128/78 on Hyzaar qAM, Toprol  mg 1/2 qam and Amlodipine 5 mg qd. She is adherent to a low sodium diet.  Patient denies chest pain, shortness of breath, headache, lightheadedness, blurred vision, palpitations, dry cough and fatigue. Antihypertensive medication side effects: no medication side effects noted. Use of agents associated with hypertension: none.       Lab Results   Component Value Date    LABA1C 5.9 05/02/2022    LABA1C 5.7 11/04/2021    LABA1C 6.3 11/13/2020    LABMICR Not Indicated 02/08/2022     Lab Results   Component Value Date     02/08/2022    K 3.9 02/08/2022     02/08/2022    CO2 27 02/08/2022    BUN 24 (H) 02/08/2022    CREATININE 0.7 02/08/2022    GLUCOSE 112 (H) 02/08/2022    CALCIUM 10.2 02/08/2022     Lab Results   Component Value Date    CHOL 153 04/25/2022    TRIG 65 04/25/2022    HDL 65 04/25/2022    HDL 66 02/03/2012    LDLCALC 75 04/25/2022     Lab Results   Component Value Date    ALT 15 12/16/2021    AST 22 12/16/2021     Lab Results   Component Value Date    TSH 1.24 08/09/2012    TSH 1.15 07/15/2010     Lab Results   Component Value Date    WBC 4.9 04/25/2022    HGB 12.5 04/25/2022    HCT 38.1 04/25/2022    MCV 90.9 04/25/2022     04/25/2022     Lab Results   Component Value Date    INR 0.95 09/14/2016    INR 0.95 08/30/2016      No results found for: PSA   No results found for: LABURIC     Patient Active Problem List   Diagnosis    HTN (hypertension)    Fibromyalgia    Prediabetes    Primary localized osteoarthrosis, lower leg    History of total knee arthroplasty, bilateral    Glaucoma    Sebaceous cyst of skin of right breast    TANIKA (stress urinary incontinence, female)    Morbidly obese ([facility] [address]    Type 2 diabetes mellitus without complication, without long-term current use of insulin ([facility])    Anxiety and depression    Panic attack    Hypokalemia    Hypomagnesemia    Asymptomatic bacteriuria    Generalized anxiety disorder       Allergies   Allergen Reactions    Norco [Hydrocodone-Acetaminophen] Nausea Only    Vanilla     Ace Inhibitors Rash     Coughing and itching    Tape [Adhesive Tape] Rash     Outpatient Medications Marked as Taking for the 5/2/22 encounter (Office Visit) with Stephania Richard MD   Medication Sig Dispense Refill    metoprolol succinate (TOPROL XL) 100 MG extended release tablet Take 1.5 tablets by mouth daily 45 tablet 0    atorvastatin (LIPITOR) 20 MG tablet Take 1 tablet by mouth daily 30 tablet 11    magnesium 200 MG TABS tablet Take 1 tablet by mouth daily 30 tablet 11    losartan-hydroCHLOROthiazide (HYZAAR) 100-25 MG per tablet Take 1 tablet by mouth daily 30 tablet 11    mirtazapine (REMERON) 7.5 MG tablet Take 1 tablet by mouth nightly 30 tablet 0    latanoprost (XALATAN) 0.005 % ophthalmic solution Place 1 drop into both eyes nightly      dorzolamide-timolol 22.3-6.8 MG/ML SOLN Apply 1 drop to eye 2 times daily USE DROPS ON RIGHT EYE ONLY BID.  sertraline (ZOLOFT) 100 MG tablet Take 1.5 tablets by mouth nightly 135 tablet 1    aspirin 81 MG tablet Take 1 tablet by mouth daily 90 tablet 1         Review of Systems: 14 systems were negative except of what was stated on HPI    Nursing note and vitals reviewed. Vitals:    05/02/22 1052   BP: 132/78   Pulse: 72   SpO2: 96%   Weight: 212 lb (96.2 kg)   Height: 5' (1.524 m)     Wt Readings from Last 3 Encounters:   05/02/22 212 lb (96.2 kg)   04/06/22 205 lb (93 kg)   03/07/22 204 lb (92.5 kg)     BP Readings from Last 3 Encounters:   05/02/22 132/78   04/06/22 (!) 160/78   12/16/21 (!) 150/65     Body mass index is 41.4 kg/m². Constitutional: Patient appears well-developed and well-nourished. No distress. Head: Normocephalic and atraumatic. Neck: Normal range of motion. Neck supple. No thyroidmegaly. Cardiovascular: Normal rate, regular rhythm, normal heart sounds and intact distal pulses. Pulmonary/Chest: Effort normal and breath sounds normal. No stridor. No respiratory distress. No wheezes and no rales. Abdominal: Soft. Bowel sounds are normal. No distension and no mass. No tenderness. No rebound and no guarding. Musculoskeletal: No edema and no tenderness.    Skin: No rash or erythema.

## 2022-05-02 NOTE — PATIENT INSTRUCTIONS
Once you run out of the metoprolol 100 mg 1 1/2 pill a day, stop the amlodipine 5 mg and start the higher dose of metoprolol 200 mg 1 at night

## 2022-06-01 ENCOUNTER — OFFICE VISIT (OUTPATIENT)
Dept: INTERNAL MEDICINE CLINIC | Age: 77
End: 2022-06-01
Payer: MEDICARE

## 2022-06-01 VITALS
SYSTOLIC BLOOD PRESSURE: 142 MMHG | BODY MASS INDEX: 42.01 KG/M2 | HEART RATE: 64 BPM | OXYGEN SATURATION: 95 % | HEIGHT: 60 IN | DIASTOLIC BLOOD PRESSURE: 72 MMHG | WEIGHT: 214 LBS

## 2022-06-01 DIAGNOSIS — I10 ESSENTIAL HYPERTENSION: Primary | ICD-10-CM

## 2022-06-01 DIAGNOSIS — E66.01 OBESITY, CLASS III, BMI 40-49.9 (MORBID OBESITY) (HCC): ICD-10-CM

## 2022-06-01 DIAGNOSIS — E78.49 OTHER HYPERLIPIDEMIA: ICD-10-CM

## 2022-06-01 PROCEDURE — G8399 PT W/DXA RESULTS DOCUMENT: HCPCS | Performed by: INTERNAL MEDICINE

## 2022-06-01 PROCEDURE — 1090F PRES/ABSN URINE INCON ASSESS: CPT | Performed by: INTERNAL MEDICINE

## 2022-06-01 PROCEDURE — 99213 OFFICE O/P EST LOW 20 MIN: CPT | Performed by: INTERNAL MEDICINE

## 2022-06-01 PROCEDURE — G8427 DOCREV CUR MEDS BY ELIG CLIN: HCPCS | Performed by: INTERNAL MEDICINE

## 2022-06-01 PROCEDURE — 1036F TOBACCO NON-USER: CPT | Performed by: INTERNAL MEDICINE

## 2022-06-01 PROCEDURE — 1123F ACP DISCUSS/DSCN MKR DOCD: CPT | Performed by: INTERNAL MEDICINE

## 2022-06-01 PROCEDURE — G8417 CALC BMI ABV UP PARAM F/U: HCPCS | Performed by: INTERNAL MEDICINE

## 2022-06-01 RX ORDER — METOPROLOL SUCCINATE 200 MG/1
200 TABLET, EXTENDED RELEASE ORAL DAILY
Qty: 30 TABLET | Refills: 5 | Status: SHIPPED | OUTPATIENT
Start: 2022-06-01

## 2022-06-01 ASSESSMENT — PATIENT HEALTH QUESTIONNAIRE - PHQ9
8. MOVING OR SPEAKING SO SLOWLY THAT OTHER PEOPLE COULD HAVE NOTICED. OR THE OPPOSITE, BEING SO FIGETY OR RESTLESS THAT YOU HAVE BEEN MOVING AROUND A LOT MORE THAN USUAL: 0
2. FEELING DOWN, DEPRESSED OR HOPELESS: 0
SUM OF ALL RESPONSES TO PHQ QUESTIONS 1-9: 0
7. TROUBLE CONCENTRATING ON THINGS, SUCH AS READING THE NEWSPAPER OR WATCHING TELEVISION: 0
6. FEELING BAD ABOUT YOURSELF - OR THAT YOU ARE A FAILURE OR HAVE LET YOURSELF OR YOUR FAMILY DOWN: 0
4. FEELING TIRED OR HAVING LITTLE ENERGY: 0
9. THOUGHTS THAT YOU WOULD BE BETTER OFF DEAD, OR OF HURTING YOURSELF: 0
3. TROUBLE FALLING OR STAYING ASLEEP: 0
SUM OF ALL RESPONSES TO PHQ QUESTIONS 1-9: 0
5. POOR APPETITE OR OVEREATING: 0
SUM OF ALL RESPONSES TO PHQ QUESTIONS 1-9: 0
10. IF YOU CHECKED OFF ANY PROBLEMS, HOW DIFFICULT HAVE THESE PROBLEMS MADE IT FOR YOU TO DO YOUR WORK, TAKE CARE OF THINGS AT HOME, OR GET ALONG WITH OTHER PEOPLE: 0
1. LITTLE INTEREST OR PLEASURE IN DOING THINGS: 0
SUM OF ALL RESPONSES TO PHQ9 QUESTIONS 1 & 2: 0
SUM OF ALL RESPONSES TO PHQ QUESTIONS 1-9: 0

## 2022-06-01 NOTE — PROGRESS NOTES
Ashlee Adorno  YOB: 1945    Date of Service:  6/1/2022    Chief Complaint:      Chief Complaint   Patient presents with    Hypertension       Assessment/Plan:  Nessaelis Hurleys was seen today for hypertension. Diagnoses and all orders for this visit:    Essential hypertension  -     metoprolol succinate (TOPROL XL) 200 MG extended release tablet; Take 1 tablet by mouth daily    Other hyperlipidemia  Stable and continue on current treatment    Obesity, Class III, BMI 40-49.9 (morbid obesity) ([facility])    Goals:   -Eat small amounts of food 4-5 times daily  -Avoid high fat and high sugar foods  -Include protein with all meals and snacks  -Avoid carbonation and caffeine  -Avoid calorie containing beverages  -Increase physical activity as tolerated    Stable and continue on current medications. Return Nov 21 at 11:50 Medicare Wellness and f/u. HPI:  Ashlee Adorno is a 65 y.o. She complains of bilateral leg edema after a long flight a month ago and afterwards had intermittent leg edema.  No swelling in the past week.     Treatment Adherence:   Medication compliance:  compliant most of the time  Diet compliance:  compliant most of the time  Weight trend: stable  Current exercise: walks 5 time(s) per week  Barriers: none     Diabetes Mellitus Type 2: Current symptoms/problems include none. Stable off Metformon 500 mg twice a day     Home blood sugar records: fasting range: 113-135 off medication   Any episodes of hypoglycemia? no  Eye exam current (within one year): yes  Tobacco history: She  reports that she quit smoking about 54 years ago. Her smoking use included cigarettes. She has a 0.50 pack-year smoking history.  She has never used smokeless tobacco.   Daily Aspirin? Yes  Known diabetic complications: none     Hyperlipidemia:  No new myalgias or GI upset on atorvastatin (Lipitor) 20 mg qd.     Anxiety/depression:  Stable on remeron 7.5 mg evening, zyprexa 5 mg evening and zoloft 100 mg 1 1/2 daily per Dr. Mariya Thomas.     Hypertension:  Home blood pressure monitoring: Yes - 128/78 on Hyzaar 100/25 qAM, Toprol  mg qAM   She is adherent to a low sodium diet.  Patient denies chest pain, shortness of breath, headache, lightheadedness, blurred vision, palpitations, dry cough and fatigue.  Antihypertensive medication side effects: no medication side effects noted.  Use of agents associated with hypertension: none.      Lab Results   Component Value Date    LABA1C 5.9 05/02/2022    LABA1C 5.7 11/04/2021    LABA1C 6.3 11/13/2020    LABMICR Not Indicated 02/08/2022     Lab Results   Component Value Date     02/08/2022    K 3.9 02/08/2022     02/08/2022    CO2 27 02/08/2022    BUN 24 (H) 02/08/2022    CREATININE 0.7 02/08/2022    GLUCOSE 112 (H) 02/08/2022    CALCIUM 10.2 02/08/2022     Lab Results   Component Value Date    CHOL 153 04/25/2022    TRIG 65 04/25/2022    HDL 65 04/25/2022    HDL 66 02/03/2012    LDLCALC 75 04/25/2022     Lab Results   Component Value Date    ALT 15 12/16/2021    AST 22 12/16/2021     Lab Results   Component Value Date    TSH 1.24 08/09/2012    TSH 1.15 07/15/2010     Lab Results   Component Value Date    WBC 4.9 04/25/2022    HGB 12.5 04/25/2022    HCT 38.1 04/25/2022    MCV 90.9 04/25/2022     04/25/2022     Lab Results   Component Value Date    INR 0.95 09/14/2016    INR 0.95 08/30/2016      No results found for: PSA   No results found for: LABURIC     Patient Active Problem List   Diagnosis    HTN (hypertension)    Fibromyalgia    Prediabetes    Primary localized osteoarthrosis, lower leg    History of total knee arthroplasty, bilateral    Glaucoma    Sebaceous cyst of skin of right breast    TANIKA (stress urinary incontinence, female)    Morbidly obese ([address]    Anxiety and depression    Panic attack    Hypokalemia    Hypomagnesemia    Asymptomatic bacteriuria    Generalized anxiety disorder       Allergies   Allergen Reactions    Norco rhythm, normal heart sounds and intact distal pulses. Pulmonary/Chest: Effort normal and breath sounds normal. No stridor. No respiratory distress. No wheezes and no rales. Abdominal: Soft. Bowel sounds are normal. No distension and no mass. No tenderness. No rebound and no guarding. Musculoskeletal: No edema and no tenderness. Skin: No rash or erythema.

## 2022-11-21 ENCOUNTER — OFFICE VISIT (OUTPATIENT)
Dept: INTERNAL MEDICINE CLINIC | Age: 77
End: 2022-11-21
Payer: MEDICARE

## 2022-11-21 VITALS
WEIGHT: 233 LBS | HEIGHT: 60 IN | OXYGEN SATURATION: 94 % | DIASTOLIC BLOOD PRESSURE: 70 MMHG | HEART RATE: 76 BPM | BODY MASS INDEX: 45.75 KG/M2 | SYSTOLIC BLOOD PRESSURE: 120 MMHG

## 2022-11-21 DIAGNOSIS — F41.9 ANXIETY AND DEPRESSION: ICD-10-CM

## 2022-11-21 DIAGNOSIS — Z00.00 MEDICARE ANNUAL WELLNESS VISIT, SUBSEQUENT: Primary | ICD-10-CM

## 2022-11-21 DIAGNOSIS — E66.01 OBESITY, CLASS III, BMI 40-49.9 (MORBID OBESITY) (HCC): ICD-10-CM

## 2022-11-21 DIAGNOSIS — R73.03 PREDIABETES: ICD-10-CM

## 2022-11-21 DIAGNOSIS — E78.49 OTHER HYPERLIPIDEMIA: ICD-10-CM

## 2022-11-21 DIAGNOSIS — F32.A ANXIETY AND DEPRESSION: ICD-10-CM

## 2022-11-21 DIAGNOSIS — I10 ESSENTIAL HYPERTENSION: ICD-10-CM

## 2022-11-21 LAB — HBA1C MFR BLD: 6.8 %

## 2022-11-21 PROCEDURE — 99214 OFFICE O/P EST MOD 30 MIN: CPT | Performed by: INTERNAL MEDICINE

## 2022-11-21 PROCEDURE — G8484 FLU IMMUNIZE NO ADMIN: HCPCS | Performed by: INTERNAL MEDICINE

## 2022-11-21 PROCEDURE — 83036 HEMOGLOBIN GLYCOSYLATED A1C: CPT | Performed by: INTERNAL MEDICINE

## 2022-11-21 PROCEDURE — 3078F DIAST BP <80 MM HG: CPT | Performed by: INTERNAL MEDICINE

## 2022-11-21 PROCEDURE — G8427 DOCREV CUR MEDS BY ELIG CLIN: HCPCS | Performed by: INTERNAL MEDICINE

## 2022-11-21 PROCEDURE — 3074F SYST BP LT 130 MM HG: CPT | Performed by: INTERNAL MEDICINE

## 2022-11-21 PROCEDURE — G8417 CALC BMI ABV UP PARAM F/U: HCPCS | Performed by: INTERNAL MEDICINE

## 2022-11-21 PROCEDURE — 1090F PRES/ABSN URINE INCON ASSESS: CPT | Performed by: INTERNAL MEDICINE

## 2022-11-21 PROCEDURE — G0439 PPPS, SUBSEQ VISIT: HCPCS | Performed by: INTERNAL MEDICINE

## 2022-11-21 PROCEDURE — G8399 PT W/DXA RESULTS DOCUMENT: HCPCS | Performed by: INTERNAL MEDICINE

## 2022-11-21 PROCEDURE — 1123F ACP DISCUSS/DSCN MKR DOCD: CPT | Performed by: INTERNAL MEDICINE

## 2022-11-21 PROCEDURE — 1036F TOBACCO NON-USER: CPT | Performed by: INTERNAL MEDICINE

## 2022-11-21 ASSESSMENT — PATIENT HEALTH QUESTIONNAIRE - PHQ9
5. POOR APPETITE OR OVEREATING: 0
SUM OF ALL RESPONSES TO PHQ QUESTIONS 1-9: 0
10. IF YOU CHECKED OFF ANY PROBLEMS, HOW DIFFICULT HAVE THESE PROBLEMS MADE IT FOR YOU TO DO YOUR WORK, TAKE CARE OF THINGS AT HOME, OR GET ALONG WITH OTHER PEOPLE: 0
6. FEELING BAD ABOUT YOURSELF - OR THAT YOU ARE A FAILURE OR HAVE LET YOURSELF OR YOUR FAMILY DOWN: 0
1. LITTLE INTEREST OR PLEASURE IN DOING THINGS: 0
SUM OF ALL RESPONSES TO PHQ QUESTIONS 1-9: 0
4. FEELING TIRED OR HAVING LITTLE ENERGY: 0
3. TROUBLE FALLING OR STAYING ASLEEP: 0
SUM OF ALL RESPONSES TO PHQ QUESTIONS 1-9: 0
8. MOVING OR SPEAKING SO SLOWLY THAT OTHER PEOPLE COULD HAVE NOTICED. OR THE OPPOSITE, BEING SO FIGETY OR RESTLESS THAT YOU HAVE BEEN MOVING AROUND A LOT MORE THAN USUAL: 0
SUM OF ALL RESPONSES TO PHQ QUESTIONS 1-9: 0
2. FEELING DOWN, DEPRESSED OR HOPELESS: 0
SUM OF ALL RESPONSES TO PHQ9 QUESTIONS 1 & 2: 0
7. TROUBLE CONCENTRATING ON THINGS, SUCH AS READING THE NEWSPAPER OR WATCHING TELEVISION: 0
9. THOUGHTS THAT YOU WOULD BE BETTER OFF DEAD, OR OF HURTING YOURSELF: 0

## 2022-11-21 ASSESSMENT — LIFESTYLE VARIABLES
HOW MANY STANDARD DRINKS CONTAINING ALCOHOL DO YOU HAVE ON A TYPICAL DAY: PATIENT DOES NOT DRINK
HOW OFTEN DO YOU HAVE A DRINK CONTAINING ALCOHOL: NEVER

## 2022-11-21 NOTE — PROGRESS NOTES
Medicare Annual Wellness Visit    Janneth Lynne is here for Medicare AWV, Hypertension, Anxiety, and Depression    Assessment & Plan   Medicare annual wellness visit, subsequent    Recommendations for Preventive Services Due: see orders and patient instructions/AVS.  Recommended screening schedule for the next 5-10 years is provided to the patient in written form: see Patient Instructions/AVS.     Return for Medicare Annual Wellness Visit in 1 year. Subjective       Patient's complete Health Risk Assessment and screening values have been reviewed and are found in Flowsheets. The following problems were reviewed today and where indicated follow up appointments were made and/or referrals ordered. Positive Risk Factor Screenings with Interventions:             General Health and ACP:  General  In general, how would you say your health is?: Very Good  In the past 7 days, have you experienced any of the following: New or Increased Pain, New or Increased Fatigue, Loneliness, Social Isolation, Stress or Anger?: No  Do you get the social and emotional support that you need?: Yes  Do you have a Living Will?: (!) No (needs to make one)    Advance Directives       Power of [address] Will ACP-Advance Directive ACP-Power of     Not on File Not on File Filed Not on File          General Health Risk Interventions:  Full code    Health Habits/Nutrition:  Physical Activity: Insufficiently Active    Days of Exercise per Week: 3 days    Minutes of Exercise per Session: 30 min     Have you lost any weight without trying in the past 3 months?: No  Body mass index: (!) 45.5  Have you seen the dentist within the past year?: Yes  Health Habits/Nutrition Interventions:               Objective   Vitals:    11/21/22 1142 11/21/22 1150   BP: (!) 168/94 (!) 152/74   Pulse: 76    SpO2: 94%    Weight: 233 lb (105.7 kg)    Height: 5' (1.524 m)       Body mass index is 45.5 kg/m².              Allergies   Allergen Reactions Norco [Hydrocodone-Acetaminophen] Nausea Only    Vanilla     Ace Inhibitors Rash     Coughing and itching    Tape [Adhesive Tape] Rash     Prior to Visit Medications    Medication Sig Taking? Authorizing Provider   metoprolol succinate (TOPROL XL) 200 MG extended release tablet Take 1 tablet by mouth daily Yes Michelle Richard MD   atorvastatin (LIPITOR) 20 MG tablet Take 1 tablet by mouth daily Yes Michelle Richard MD   magnesium 200 MG TABS tablet Take 1 tablet by mouth daily Yes Michelle Richard MD   losartan-hydroCHLOROthiazide (HYZAAR) 100-25 MG per tablet Take 1 tablet by mouth daily Yes Michelle Richard MD   mirtazapine (REMERON) 7.5 MG tablet Take 1 tablet by mouth nightly Yes SHEY Orozco CNP   OLANZapine (ZYPREXA) 2.5 MG tablet Take 1 tablet by mouth nightly  Patient taking differently: Take 5 mg by mouth nightly Yes SHEY Orozco CNP   latanoprost (XALATAN) 0.005 % ophthalmic solution Place 1 drop into both eyes nightly Yes Historical Provider, MD   dorzolamide-timolol 22.3-6.8 MG/ML SOLN Apply 1 drop to eye 2 times daily USE DROPS ON RIGHT EYE ONLY BID.  Yes Historical Provider, MD   sertraline (ZOLOFT) 100 MG tablet Take 1.5 tablets by mouth nightly Yes Hood Suazo MD   aspirin 81 MG tablet Take 1 tablet by mouth daily Yes Hood Suazo MD       [facility] (Including outside providers/suppliers regularly involved in providing care):   Patient Care Team:  Melissa Oropeza MD as PCP - General (Internal Medicine)  Melissa Oropeza MD as PCP - [facility] Empaneled Provider  MORALES Rizo as Counselor (Licensed Clinical )     Reviewed and updated this visit:  Tobacco  Allergies  Meds  Problems  Med Hx  Surg Hx  Soc Hx  Fam Hx

## 2022-11-21 NOTE — PROGRESS NOTES
Shannan Fontana  YOB: 1945    Date of Service:  11/21/2022    Chief Complaint:      Chief Complaint   Patient presents with    Medicare AWV    Hypertension    Anxiety    Depression       Assessment/Plan:  Shabana Lackey was seen today for medicare awv, hypertension, anxiety and depression. Diagnoses and all orders for this visit:    Medicare annual wellness visit, subsequent    Prediabetes  -     POCT glycosylated hemoglobin (Hb A1C)    Essential hypertension    Other hyperlipidemia    Anxiety and depression    Obesity, Class III, BMI 40-49.9 (morbid obesity) ([address]    Stable and continue on current medications. Return 5/17 at 10:10 Fasting cholesterol, BP.,,,.      HPI:  Shannan Fontana is a 65 y.o. Treatment Adherence:   Medication compliance:  compliant most of the time  Diet compliance:  compliant most of the time  Weight trend: stable  Current exercise: walks 5 time(s) per week  Barriers: none     Diabetes Mellitus Type 2: Current symptoms/problems include none. Stable off Metformon 500 mg twice a day     Home blood sugar records: fasting range: 113-135 off medication   Any episodes of hypoglycemia? no  Eye exam current (within one year): yes  Tobacco history: She  reports that she quit smoking about 54 years ago. Her smoking use included cigarettes. She has a 0.50 pack-year smoking history. She has never used smokeless tobacco.   Daily Aspirin? Yes  Known diabetic complications: none     Hyperlipidemia:  No new myalgias or GI upset on atorvastatin (Lipitor) 20 mg qd. Anxiety/depression:  Stable on remeron 7.5 mg evening, zyprexa 5 mg evening and zoloft 100 mg 1 1/2 daily per Dr. Doroteo Osgood. Hypertension:  Home blood pressure monitoring: Yes - 128/78 on Hyzaar 100/25 qAM, Toprol  mg qAM   She is adherent to a low sodium diet. Patient denies chest pain, shortness of breath, headache, lightheadedness, blurred vision, palpitations, dry cough and fatigue.   Antihypertensive medication side effects: no medication side effects noted. Use of agents associated with hypertension: none.     Anxiety:  stable on medication per psych Dr. Fabiana [id number]    Lab Results   Component Value Date    LABA1C 5.9 05/02/2022    LABA1C 5.7 11/04/2021    LABA1C 6.3 11/13/2020    LABMICR Not Indicated 02/08/2022     Lab Results   Component Value Date     02/08/2022    K 3.9 02/08/2022     02/08/2022    CO2 27 02/08/2022    BUN 24 (H) 02/08/2022    CREATININE 0.7 02/08/2022    GLUCOSE 112 (H) 02/08/2022    CALCIUM 10.2 02/08/2022     Lab Results   Component Value Date/Time    CHOL 153 04/25/2022 08:27 AM    TRIG 65 04/25/2022 08:27 AM    HDL 65 04/25/2022 08:27 AM    HDL 66 02/03/2012 12:54 PM    LDLCALC 75 04/25/2022 08:27 AM     Lab Results   Component Value Date    ALT 15 12/16/2021    AST 22 12/16/2021     Lab Results   Component Value Date    TSH 1.24 08/09/2012    TSH 1.15 07/15/2010     Lab Results   Component Value Date    WBC 4.9 04/25/2022    HGB 12.5 04/25/2022    HCT 38.1 04/25/2022    MCV 90.9 04/25/2022     04/25/2022     Lab Results   Component Value Date    INR 0.95 09/14/2016    INR 0.95 08/30/2016      No results found for: PSA   No results found for: LABURIC     Patient Active Problem List   Diagnosis    HTN (hypertension)    Fibromyalgia    Primary localized osteoarthrosis, lower leg    History of total knee arthroplasty, bilateral    Glaucoma    Sebaceous cyst of skin of right breast    TANIKA (stress urinary incontinence, female)    Morbidly obese (HCC)    Anxiety and depression    Panic attack    Hypokalemia    Hypomagnesemia    Asymptomatic bacteriuria    Generalized anxiety disorder       Allergies   Allergen Reactions    Norco [Hydrocodone-Acetaminophen] Nausea Only    Vanilla     Ace Inhibitors Rash     Coughing and itching    Tape [Adhesive Tape] Rash     Outpatient Medications Marked as Taking for the 11/21/22 encounter (Office Visit) with Cain Carlin MD   Medication Sig Dispense Refill    metoprolol succinate (TOPROL XL) 200 MG extended release tablet Take 1 tablet by mouth daily 30 tablet 5    atorvastatin (LIPITOR) 20 MG tablet Take 1 tablet by mouth daily 30 tablet 11    magnesium 200 MG TABS tablet Take 1 tablet by mouth daily 30 tablet 11    losartan-hydroCHLOROthiazide (HYZAAR) 100-25 MG per tablet Take 1 tablet by mouth daily 30 tablet 11    mirtazapine (REMERON) 7.5 MG tablet Take 1 tablet by mouth nightly 30 tablet 0    OLANZapine (ZYPREXA) 2.5 MG tablet Take 1 tablet by mouth nightly (Patient taking differently: Take 5 mg by mouth nightly) 30 tablet 0    latanoprost (XALATAN) 0.005 % ophthalmic solution Place 1 drop into both eyes nightly      dorzolamide-timolol 22.3-6.8 MG/ML SOLN Apply 1 drop to eye 2 times daily USE DROPS ON RIGHT EYE ONLY BID.      sertraline (ZOLOFT) 100 MG tablet Take 1.5 tablets by mouth nightly 135 tablet 1    aspirin 81 MG tablet Take 1 tablet by mouth daily 90 tablet 1         Review of Systems: 14 systems were negative except of what was stated on HPI    Nursing note and vitals reviewed. Vitals:    11/21/22 1142 11/21/22 1150 11/21/22 1212   BP: (!) 168/94 (!) 152/74 120/70   Pulse: 76     SpO2: 94%     Weight: 233 lb (105.7 kg)     Height: 5' (1.524 m)       Wt Readings from Last 3 Encounters:   11/21/22 233 lb (105.7 kg)   06/01/22 214 lb (97.1 kg)   05/02/22 212 lb (96.2 kg)     BP Readings from Last 3 Encounters:   11/21/22 (!) 152/74   06/01/22 (!) 142/72   05/02/22 132/78     Body mass index is 45.5 kg/m². Constitutional: Patient appears well-developed and well-nourished. No distress. Head: Normocephalic and atraumatic. Neck: Normal range of motion. Neck supple. No thyroidmegaly. Cardiovascular: Normal rate, regular rhythm, normal heart sounds and intact distal pulses. Pulmonary/Chest: Effort normal and breath sounds normal. No stridor. No respiratory distress. No wheezes and no rales. Abdominal: Soft.  Bowel sounds are normal. No distension and no mass. No tenderness. No rebound and no guarding. Musculoskeletal: No edema and no tenderness. Skin: No rash or erythema.

## 2022-11-21 NOTE — PATIENT INSTRUCTIONS
Personalized Preventive Plan for Mandy Baker - 11/21/2022  Medicare offers a range of preventive health benefits. Some of the tests and screenings are paid in full while other may be subject to a deductible, co-insurance, and/or copay. Some of these benefits include a comprehensive review of your medical history including lifestyle, illnesses that may run in your family, and various assessments and screenings as appropriate. After reviewing your medical record and screening and assessments performed today your provider may have ordered immunizations, labs, imaging, and/or referrals for you. A list of these orders (if applicable) as well as your Preventive Care list are included within your After Visit Summary for your review. Other Preventive Recommendations:    A preventive eye exam performed by an eye specialist is recommended every 1-2 years to screen for glaucoma; cataracts, macular degeneration, and other eye disorders. A preventive dental visit is recommended every 6 months. Try to get at least 150 minutes of exercise per week or 10,000 steps per day on a pedometer . Order or download the FREE \"Exercise & Physical Activity: Your Everyday Guide\" from [employer]. Call 5-290.562.9154 or search [employer] online. You need 6681-7705 mg of calcium and 4159-7961 IU of vitamin D per day. It is possible to meet your calcium requirement with diet alone, but a vitamin D supplement is usually necessary to meet this goal.  When exposed to the sun, use a sunscreen that protects against both UVA and UVB radiation with an SPF of 30 or greater. Reapply every 2 to 3 hours or after sweating, drying off with a towel, or swimming. Always wear a seat belt when traveling in a car. Always wear a helmet when riding a bicycle or motorcycle.

## 2022-12-09 DIAGNOSIS — I10 ESSENTIAL HYPERTENSION: ICD-10-CM

## 2022-12-12 RX ORDER — METOPROLOL SUCCINATE 200 MG/1
TABLET, EXTENDED RELEASE ORAL
Qty: 30 TABLET | Refills: 5 | Status: SHIPPED | OUTPATIENT
Start: 2022-12-12

## 2023-01-17 ENCOUNTER — TELEPHONE (OUTPATIENT)
Dept: INTERNAL MEDICINE CLINIC | Age: 78
End: 2023-01-17

## 2023-01-17 NOTE — TELEPHONE ENCOUNTER
Spouse calling to see what patient can use for sore throat that started this am.  Advised salt water gargles, sore throat lozenges, or spray. Tylenol if needed. Avoid ibuprofen (B/P). Patient states he will let us know if symptoms worsen, change or do not improve.

## 2023-02-28 ENCOUNTER — TELEPHONE (OUTPATIENT)
Dept: INTERNAL MEDICINE CLINIC | Age: 78
End: 2023-02-28

## 2023-02-28 NOTE — TELEPHONE ENCOUNTER
Dr.Babu De Jesus's office reaching out to give you 's cell # to contact him regarding this patient.   [phone]

## 2023-04-20 DIAGNOSIS — I10 ESSENTIAL HYPERTENSION: ICD-10-CM

## 2023-04-20 RX ORDER — LOSARTAN POTASSIUM AND HYDROCHLOROTHIAZIDE 25; 100 MG/1; MG/1
1 TABLET ORAL DAILY
Qty: 30 TABLET | Refills: 0 | Status: SHIPPED | OUTPATIENT
Start: 2023-04-20

## 2023-05-10 ENCOUNTER — OFFICE VISIT (OUTPATIENT)
Dept: INTERNAL MEDICINE CLINIC | Age: 78
End: 2023-05-10

## 2023-05-10 VITALS
BODY MASS INDEX: 43.94 KG/M2 | RESPIRATION RATE: 14 BRPM | SYSTOLIC BLOOD PRESSURE: 128 MMHG | HEART RATE: 63 BPM | OXYGEN SATURATION: 95 % | WEIGHT: 223.8 LBS | HEIGHT: 60 IN | DIASTOLIC BLOOD PRESSURE: 76 MMHG

## 2023-05-10 DIAGNOSIS — R73.9 HYPERGLYCEMIA: ICD-10-CM

## 2023-05-10 DIAGNOSIS — E66.01 OBESITY, CLASS III, BMI 40-49.9 (MORBID OBESITY) (HCC): ICD-10-CM

## 2023-05-10 DIAGNOSIS — N39.3 SUI (STRESS URINARY INCONTINENCE, FEMALE): Primary | ICD-10-CM

## 2023-05-10 DIAGNOSIS — F41.1 GENERALIZED ANXIETY DISORDER: ICD-10-CM

## 2023-05-10 DIAGNOSIS — E78.49 OTHER HYPERLIPIDEMIA: ICD-10-CM

## 2023-05-10 DIAGNOSIS — Z11.59 NEED FOR HEPATITIS C SCREENING TEST: ICD-10-CM

## 2023-05-10 DIAGNOSIS — I10 ESSENTIAL HYPERTENSION: ICD-10-CM

## 2023-05-10 PROBLEM — N60.81 SEBACEOUS CYST OF SKIN OF RIGHT BREAST: Status: RESOLVED | Noted: 2018-08-24 | Resolved: 2023-05-10

## 2023-05-10 RX ORDER — TOLTERODINE 4 MG/1
4 CAPSULE, EXTENDED RELEASE ORAL DAILY
Qty: 30 CAPSULE | Refills: 0 | Status: SHIPPED | OUTPATIENT
Start: 2023-05-10

## 2023-05-10 RX ORDER — LOSARTAN POTASSIUM AND HYDROCHLOROTHIAZIDE 25; 100 MG/1; MG/1
1 TABLET ORAL DAILY
Qty: 30 TABLET | Refills: 5 | Status: SHIPPED | OUTPATIENT
Start: 2023-05-10

## 2023-05-10 RX ORDER — LORAZEPAM 0.5 MG/1
0.5 TABLET ORAL NIGHTLY
COMMUNITY
Start: 2023-05-01

## 2023-05-10 RX ORDER — ATORVASTATIN CALCIUM 20 MG/1
20 TABLET, FILM COATED ORAL DAILY
Qty: 30 TABLET | Refills: 11 | Status: SHIPPED | OUTPATIENT
Start: 2023-05-10

## 2023-05-10 RX ORDER — BRIMONIDINE TARTRATE 2 MG/ML
1 SOLUTION/ DROPS OPHTHALMIC 2 TIMES DAILY
COMMUNITY
Start: 2022-09-26

## 2023-05-10 RX ORDER — METOPROLOL SUCCINATE 200 MG/1
200 TABLET, EXTENDED RELEASE ORAL DAILY
Qty: 30 TABLET | Refills: 5 | Status: SHIPPED | OUTPATIENT
Start: 2023-05-10

## 2023-05-10 ASSESSMENT — PATIENT HEALTH QUESTIONNAIRE - PHQ9
1. LITTLE INTEREST OR PLEASURE IN DOING THINGS: 0
3. TROUBLE FALLING OR STAYING ASLEEP: 0
SUM OF ALL RESPONSES TO PHQ QUESTIONS 1-9: 0
10. IF YOU CHECKED OFF ANY PROBLEMS, HOW DIFFICULT HAVE THESE PROBLEMS MADE IT FOR YOU TO DO YOUR WORK, TAKE CARE OF THINGS AT HOME, OR GET ALONG WITH OTHER PEOPLE: 0
SUM OF ALL RESPONSES TO PHQ QUESTIONS 1-9: 0
8. MOVING OR SPEAKING SO SLOWLY THAT OTHER PEOPLE COULD HAVE NOTICED. OR THE OPPOSITE, BEING SO FIGETY OR RESTLESS THAT YOU HAVE BEEN MOVING AROUND A LOT MORE THAN USUAL: 0
7. TROUBLE CONCENTRATING ON THINGS, SUCH AS READING THE NEWSPAPER OR WATCHING TELEVISION: 0
9. THOUGHTS THAT YOU WOULD BE BETTER OFF DEAD, OR OF HURTING YOURSELF: 0
4. FEELING TIRED OR HAVING LITTLE ENERGY: 0
5. POOR APPETITE OR OVEREATING: 0
SUM OF ALL RESPONSES TO PHQ9 QUESTIONS 1 & 2: 0
SUM OF ALL RESPONSES TO PHQ QUESTIONS 1-9: 0
2. FEELING DOWN, DEPRESSED OR HOPELESS: 0
6. FEELING BAD ABOUT YOURSELF - OR THAT YOU ARE A FAILURE OR HAVE LET YOURSELF OR YOUR FAMILY DOWN: 0
SUM OF ALL RESPONSES TO PHQ QUESTIONS 1-9: 0

## 2023-05-11 LAB
ALBUMIN SERPL-MCNC: 4.4 G/DL (ref 3.4–5)
ALBUMIN/GLOB SERPL: 1.6 {RATIO} (ref 1.1–2.2)
ALP SERPL-CCNC: 94 U/L (ref 40–129)
ALT SERPL-CCNC: 14 U/L (ref 10–40)
ANION GAP SERPL CALCULATED.3IONS-SCNC: 11 MMOL/L (ref 3–16)
AST SERPL-CCNC: 19 U/L (ref 15–37)
BASOPHILS # BLD: 0.1 K/UL (ref 0–0.2)
BASOPHILS NFR BLD: 0.9 %
BILIRUB SERPL-MCNC: 0.6 MG/DL (ref 0–1)
BUN SERPL-MCNC: 23 MG/DL (ref 7–20)
CALCIUM SERPL-MCNC: 10.2 MG/DL (ref 8.3–10.6)
CHLORIDE SERPL-SCNC: 102 MMOL/L (ref 99–110)
CHOLEST SERPL-MCNC: 152 MG/DL (ref 0–199)
CO2 SERPL-SCNC: 30 MMOL/L (ref 21–32)
CREAT SERPL-MCNC: 0.9 MG/DL (ref 0.6–1.2)
DEPRECATED RDW RBC AUTO: 13.2 % (ref 12.4–15.4)
EOSINOPHIL # BLD: 0.1 K/UL (ref 0–0.6)
EOSINOPHIL NFR BLD: 2.3 %
EST. AVERAGE GLUCOSE BLD GHB EST-MCNC: 134.1 MG/DL
GFR SERPLBLD CREATININE-BSD FMLA CKD-EPI: >60 ML/MIN/{1.73_M2}
GLUCOSE SERPL-MCNC: 136 MG/DL (ref 70–99)
HBA1C MFR BLD: 6.3 %
HCT VFR BLD AUTO: 41.8 % (ref 36–48)
HCV AB SERPL QL IA: NORMAL
HDLC SERPL-MCNC: 56 MG/DL (ref 40–60)
HGB BLD-MCNC: 13.5 G/DL (ref 12–16)
LDLC SERPL CALC-MCNC: 74 MG/DL
LYMPHOCYTES # BLD: 1.6 K/UL (ref 1–5.1)
LYMPHOCYTES NFR BLD: 27.3 %
MCH RBC QN AUTO: 30.4 PG (ref 26–34)
MCHC RBC AUTO-ENTMCNC: 32.4 G/DL (ref 31–36)
MCV RBC AUTO: 93.8 FL (ref 80–100)
MONOCYTES # BLD: 0.3 K/UL (ref 0–1.3)
MONOCYTES NFR BLD: 6 %
NEUTROPHILS # BLD: 3.6 K/UL (ref 1.7–7.7)
NEUTROPHILS NFR BLD: 63.5 %
PLATELET # BLD AUTO: 217 K/UL (ref 135–450)
PMV BLD AUTO: 8.5 FL (ref 5–10.5)
POTASSIUM SERPL-SCNC: 4.3 MMOL/L (ref 3.5–5.1)
PROT SERPL-MCNC: 7.2 G/DL (ref 6.4–8.2)
RBC # BLD AUTO: 4.45 M/UL (ref 4–5.2)
SODIUM SERPL-SCNC: 143 MMOL/L (ref 136–145)
TRIGL SERPL-MCNC: 109 MG/DL (ref 0–150)
VLDLC SERPL CALC-MCNC: 22 MG/DL
WBC # BLD AUTO: 5.7 K/UL (ref 4–11)

## 2023-06-08 ENCOUNTER — OFFICE VISIT (OUTPATIENT)
Dept: INTERNAL MEDICINE CLINIC | Age: 78
End: 2023-06-08
Payer: MEDICARE

## 2023-06-08 VITALS
SYSTOLIC BLOOD PRESSURE: 132 MMHG | HEART RATE: 63 BPM | HEIGHT: 60 IN | OXYGEN SATURATION: 93 % | WEIGHT: 225 LBS | BODY MASS INDEX: 44.17 KG/M2 | DIASTOLIC BLOOD PRESSURE: 76 MMHG

## 2023-06-08 DIAGNOSIS — N39.3 SUI (STRESS URINARY INCONTINENCE, FEMALE): ICD-10-CM

## 2023-06-08 PROCEDURE — G8399 PT W/DXA RESULTS DOCUMENT: HCPCS | Performed by: INTERNAL MEDICINE

## 2023-06-08 PROCEDURE — 99213 OFFICE O/P EST LOW 20 MIN: CPT | Performed by: INTERNAL MEDICINE

## 2023-06-08 PROCEDURE — 3078F DIAST BP <80 MM HG: CPT | Performed by: INTERNAL MEDICINE

## 2023-06-08 PROCEDURE — 1036F TOBACCO NON-USER: CPT | Performed by: INTERNAL MEDICINE

## 2023-06-08 PROCEDURE — 1090F PRES/ABSN URINE INCON ASSESS: CPT | Performed by: INTERNAL MEDICINE

## 2023-06-08 PROCEDURE — 0509F URINE INCON PLAN DOCD: CPT | Performed by: INTERNAL MEDICINE

## 2023-06-08 PROCEDURE — G8417 CALC BMI ABV UP PARAM F/U: HCPCS | Performed by: INTERNAL MEDICINE

## 2023-06-08 PROCEDURE — 3075F SYST BP GE 130 - 139MM HG: CPT | Performed by: INTERNAL MEDICINE

## 2023-06-08 PROCEDURE — 1123F ACP DISCUSS/DSCN MKR DOCD: CPT | Performed by: INTERNAL MEDICINE

## 2023-06-08 PROCEDURE — G8427 DOCREV CUR MEDS BY ELIG CLIN: HCPCS | Performed by: INTERNAL MEDICINE

## 2023-06-08 RX ORDER — TOLTERODINE 4 MG/1
4 CAPSULE, EXTENDED RELEASE ORAL DAILY
Qty: 30 CAPSULE | Refills: 6 | Status: SHIPPED | OUTPATIENT
Start: 2023-06-08

## 2023-06-08 SDOH — ECONOMIC STABILITY: FOOD INSECURITY: WITHIN THE PAST 12 MONTHS, YOU WORRIED THAT YOUR FOOD WOULD RUN OUT BEFORE YOU GOT MONEY TO BUY MORE.: NEVER TRUE

## 2023-06-08 SDOH — ECONOMIC STABILITY: HOUSING INSECURITY
IN THE LAST 12 MONTHS, WAS THERE A TIME WHEN YOU DID NOT HAVE A STEADY PLACE TO SLEEP OR SLEPT IN A SHELTER (INCLUDING NOW)?: NO

## 2023-06-08 SDOH — ECONOMIC STABILITY: INCOME INSECURITY: HOW HARD IS IT FOR YOU TO PAY FOR THE VERY BASICS LIKE FOOD, HOUSING, MEDICAL CARE, AND HEATING?: NOT HARD AT ALL

## 2023-06-08 SDOH — ECONOMIC STABILITY: FOOD INSECURITY: WITHIN THE PAST 12 MONTHS, THE FOOD YOU BOUGHT JUST DIDN'T LAST AND YOU DIDN'T HAVE MONEY TO GET MORE.: NEVER TRUE

## 2023-06-08 NOTE — PROGRESS NOTES
distal pulses. Pulmonary/Chest: Effort normal and breath sounds normal. No stridor. No respiratory distress. No wheezes and no rales. Abdominal: Soft. Bowel sounds are normal. No distension and no mass. No tenderness. No rebound and no guarding. Musculoskeletal: No edema and no tenderness. Skin: No rash or erythema.

## 2023-07-05 ENCOUNTER — HOSPITAL ENCOUNTER (OUTPATIENT)
Dept: WOMENS IMAGING | Age: 78
Discharge: HOME | End: 2023-07-05
Payer: MEDICARE

## 2023-07-05 DIAGNOSIS — Z12.31 ENCOUNTER FOR SCREENING MAMMOGRAM FOR BREAST CANCER: ICD-10-CM

## 2023-07-05 PROCEDURE — 77063 BREAST TOMOSYNTHESIS BI: CPT

## 2023-09-26 ENCOUNTER — HOSPITAL ENCOUNTER (OUTPATIENT)
Age: 78
Discharge: HOME | End: 2023-09-26
Payer: MEDICARE

## 2023-09-26 LAB
ALBUMIN SERPL-MCNC: 4.1 G/DL (ref 3.4–5)
ALP SERPL-CCNC: 84 U/L (ref 40–129)
ALT SERPL-CCNC: 16 U/L (ref 10–40)
AST SERPL-CCNC: 25 U/L (ref 15–37)
BILIRUB DIRECT SERPL-MCNC: <0.2 MG/DL (ref 0–0.3)
BILIRUB INDIRECT SERPL-MCNC: NORMAL MG/DL (ref 0–1)
BILIRUB SERPL-MCNC: 0.6 MG/DL (ref 0–1)
PROT SERPL-MCNC: 7.3 G/DL (ref 6.4–8.2)

## 2023-09-26 PROCEDURE — 36415 COLL VENOUS BLD VENIPUNCTURE: CPT

## 2023-09-26 PROCEDURE — 80076 HEPATIC FUNCTION PANEL: CPT

## 2023-11-21 ENCOUNTER — HOSPITAL ENCOUNTER (OUTPATIENT)
Age: 78
Discharge: HOME | End: 2023-11-21
Payer: COMMERCIAL

## 2023-11-21 LAB
ALBUMIN SERPL-MCNC: 4.2 G/DL (ref 3.4–5)
ALP SERPL-CCNC: 91 U/L (ref 40–129)
ALT SERPL-CCNC: 16 U/L (ref 10–40)
AST SERPL-CCNC: 27 U/L (ref 15–37)
BILIRUB DIRECT SERPL-MCNC: <0.2 MG/DL (ref 0–0.3)
BILIRUB INDIRECT SERPL-MCNC: NORMAL MG/DL (ref 0–1)
BILIRUB SERPL-MCNC: 0.6 MG/DL (ref 0–1)
PROT SERPL-MCNC: 7.3 G/DL (ref 6.4–8.2)

## 2023-11-21 PROCEDURE — 80076 HEPATIC FUNCTION PANEL: CPT

## 2023-11-21 PROCEDURE — 36415 COLL VENOUS BLD VENIPUNCTURE: CPT

## 2023-11-27 ENCOUNTER — OFFICE VISIT (OUTPATIENT)
Dept: INTERNAL MEDICINE CLINIC | Age: 78
End: 2023-11-27
Payer: MEDICARE

## 2023-11-27 VITALS
BODY MASS INDEX: 46.33 KG/M2 | OXYGEN SATURATION: 98 % | SYSTOLIC BLOOD PRESSURE: 112 MMHG | HEART RATE: 62 BPM | DIASTOLIC BLOOD PRESSURE: 66 MMHG | HEIGHT: 60 IN | WEIGHT: 236 LBS

## 2023-11-27 DIAGNOSIS — Z23 NEED FOR HEPATITIS B VACCINATION: ICD-10-CM

## 2023-11-27 DIAGNOSIS — I10 ESSENTIAL HYPERTENSION: ICD-10-CM

## 2023-11-27 DIAGNOSIS — N39.3 SUI (STRESS URINARY INCONTINENCE, FEMALE): ICD-10-CM

## 2023-11-27 DIAGNOSIS — E78.49 OTHER HYPERLIPIDEMIA: ICD-10-CM

## 2023-11-27 DIAGNOSIS — Z00.00 MEDICARE ANNUAL WELLNESS VISIT, SUBSEQUENT: Primary | ICD-10-CM

## 2023-11-27 PROCEDURE — G8399 PT W/DXA RESULTS DOCUMENT: HCPCS | Performed by: INTERNAL MEDICINE

## 2023-11-27 PROCEDURE — 1090F PRES/ABSN URINE INCON ASSESS: CPT | Performed by: INTERNAL MEDICINE

## 2023-11-27 PROCEDURE — G8427 DOCREV CUR MEDS BY ELIG CLIN: HCPCS | Performed by: INTERNAL MEDICINE

## 2023-11-27 PROCEDURE — 1036F TOBACCO NON-USER: CPT | Performed by: INTERNAL MEDICINE

## 2023-11-27 PROCEDURE — 0509F URINE INCON PLAN DOCD: CPT | Performed by: INTERNAL MEDICINE

## 2023-11-27 PROCEDURE — G0010 ADMIN HEPATITIS B VACCINE: HCPCS | Performed by: INTERNAL MEDICINE

## 2023-11-27 PROCEDURE — 1123F ACP DISCUSS/DSCN MKR DOCD: CPT | Performed by: INTERNAL MEDICINE

## 2023-11-27 PROCEDURE — G0439 PPPS, SUBSEQ VISIT: HCPCS | Performed by: INTERNAL MEDICINE

## 2023-11-27 PROCEDURE — 3074F SYST BP LT 130 MM HG: CPT | Performed by: INTERNAL MEDICINE

## 2023-11-27 PROCEDURE — 3078F DIAST BP <80 MM HG: CPT | Performed by: INTERNAL MEDICINE

## 2023-11-27 PROCEDURE — 90739 HEPB VACC 2/4 DOSE ADULT IM: CPT | Performed by: INTERNAL MEDICINE

## 2023-11-27 PROCEDURE — G8417 CALC BMI ABV UP PARAM F/U: HCPCS | Performed by: INTERNAL MEDICINE

## 2023-11-27 PROCEDURE — G8484 FLU IMMUNIZE NO ADMIN: HCPCS | Performed by: INTERNAL MEDICINE

## 2023-11-27 PROCEDURE — 99214 OFFICE O/P EST MOD 30 MIN: CPT | Performed by: INTERNAL MEDICINE

## 2023-11-27 RX ORDER — METOPROLOL SUCCINATE 200 MG/1
200 TABLET, EXTENDED RELEASE ORAL DAILY
Qty: 90 TABLET | Refills: 3 | Status: SHIPPED | OUTPATIENT
Start: 2023-11-27

## 2023-11-27 RX ORDER — ATORVASTATIN CALCIUM 20 MG/1
20 TABLET, FILM COATED ORAL DAILY
Qty: 90 TABLET | Refills: 1 | Status: SHIPPED | OUTPATIENT
Start: 2023-11-27

## 2023-11-27 RX ORDER — LOSARTAN POTASSIUM AND HYDROCHLOROTHIAZIDE 25; 100 MG/1; MG/1
1 TABLET ORAL DAILY
Qty: 90 TABLET | Refills: 3 | Status: SHIPPED | OUTPATIENT
Start: 2023-11-27

## 2023-11-27 RX ORDER — TOLTERODINE 4 MG/1
4 CAPSULE, EXTENDED RELEASE ORAL DAILY
Qty: 90 CAPSULE | Refills: 3 | Status: SHIPPED | OUTPATIENT
Start: 2023-11-27

## 2023-11-27 ASSESSMENT — PATIENT HEALTH QUESTIONNAIRE - PHQ9
SUM OF ALL RESPONSES TO PHQ QUESTIONS 1-9: 0
6. FEELING BAD ABOUT YOURSELF - OR THAT YOU ARE A FAILURE OR HAVE LET YOURSELF OR YOUR FAMILY DOWN: 0
8. MOVING OR SPEAKING SO SLOWLY THAT OTHER PEOPLE COULD HAVE NOTICED. OR THE OPPOSITE, BEING SO FIGETY OR RESTLESS THAT YOU HAVE BEEN MOVING AROUND A LOT MORE THAN USUAL: 0
7. TROUBLE CONCENTRATING ON THINGS, SUCH AS READING THE NEWSPAPER OR WATCHING TELEVISION: 0
10. IF YOU CHECKED OFF ANY PROBLEMS, HOW DIFFICULT HAVE THESE PROBLEMS MADE IT FOR YOU TO DO YOUR WORK, TAKE CARE OF THINGS AT HOME, OR GET ALONG WITH OTHER PEOPLE: 0
9. THOUGHTS THAT YOU WOULD BE BETTER OFF DEAD, OR OF HURTING YOURSELF: 0
1. LITTLE INTEREST OR PLEASURE IN DOING THINGS: 0
SUM OF ALL RESPONSES TO PHQ QUESTIONS 1-9: 0
5. POOR APPETITE OR OVEREATING: 0
SUM OF ALL RESPONSES TO PHQ9 QUESTIONS 1 & 2: 0
3. TROUBLE FALLING OR STAYING ASLEEP: 0
SUM OF ALL RESPONSES TO PHQ QUESTIONS 1-9: 0
2. FEELING DOWN, DEPRESSED OR HOPELESS: 0
SUM OF ALL RESPONSES TO PHQ QUESTIONS 1-9: 0
4. FEELING TIRED OR HAVING LITTLE ENERGY: 0

## 2023-11-27 ASSESSMENT — COLUMBIA-SUICIDE SEVERITY RATING SCALE - C-SSRS
7. DID THIS OCCUR IN THE LAST THREE MONTHS: NO
3. HAVE YOU BEEN THINKING ABOUT HOW YOU MIGHT KILL YOURSELF?: NO
4. HAVE YOU HAD THESE THOUGHTS AND HAD SOME INTENTION OF ACTING ON THEM?: NO
5. HAVE YOU STARTED TO WORK OUT OR WORKED OUT THE DETAILS OF HOW TO KILL YOURSELF? DO YOU INTEND TO CARRY OUT THIS PLAN?: NO

## 2024-01-04 ENCOUNTER — OFFICE VISIT (OUTPATIENT)
Dept: INTERNAL MEDICINE CLINIC | Age: 79
End: 2024-01-04
Payer: MEDICARE

## 2024-01-04 VITALS
HEART RATE: 66 BPM | SYSTOLIC BLOOD PRESSURE: 126 MMHG | BODY MASS INDEX: 46.09 KG/M2 | OXYGEN SATURATION: 95 % | DIASTOLIC BLOOD PRESSURE: 74 MMHG | WEIGHT: 236 LBS

## 2024-01-04 DIAGNOSIS — Z23 NEED FOR HEPATITIS B VACCINATION: ICD-10-CM

## 2024-01-04 DIAGNOSIS — R10.32 LEFT LOWER QUADRANT ABDOMINAL PAIN: Primary | ICD-10-CM

## 2024-01-04 PROCEDURE — 3078F DIAST BP <80 MM HG: CPT | Performed by: INTERNAL MEDICINE

## 2024-01-04 PROCEDURE — G0010 ADMIN HEPATITIS B VACCINE: HCPCS | Performed by: INTERNAL MEDICINE

## 2024-01-04 PROCEDURE — G8417 CALC BMI ABV UP PARAM F/U: HCPCS | Performed by: INTERNAL MEDICINE

## 2024-01-04 PROCEDURE — 99213 OFFICE O/P EST LOW 20 MIN: CPT | Performed by: INTERNAL MEDICINE

## 2024-01-04 PROCEDURE — 1036F TOBACCO NON-USER: CPT | Performed by: INTERNAL MEDICINE

## 2024-01-04 PROCEDURE — G8427 DOCREV CUR MEDS BY ELIG CLIN: HCPCS | Performed by: INTERNAL MEDICINE

## 2024-01-04 PROCEDURE — 3074F SYST BP LT 130 MM HG: CPT | Performed by: INTERNAL MEDICINE

## 2024-01-04 PROCEDURE — 1090F PRES/ABSN URINE INCON ASSESS: CPT | Performed by: INTERNAL MEDICINE

## 2024-01-04 PROCEDURE — 90739 HEPB VACC 2/4 DOSE ADULT IM: CPT | Performed by: INTERNAL MEDICINE

## 2024-01-04 PROCEDURE — 36415 COLL VENOUS BLD VENIPUNCTURE: CPT | Performed by: INTERNAL MEDICINE

## 2024-01-04 PROCEDURE — 1123F ACP DISCUSS/DSCN MKR DOCD: CPT | Performed by: INTERNAL MEDICINE

## 2024-01-04 PROCEDURE — G8484 FLU IMMUNIZE NO ADMIN: HCPCS | Performed by: INTERNAL MEDICINE

## 2024-01-04 PROCEDURE — G8399 PT W/DXA RESULTS DOCUMENT: HCPCS | Performed by: INTERNAL MEDICINE

## 2024-01-04 ASSESSMENT — PATIENT HEALTH QUESTIONNAIRE - PHQ9
SUM OF ALL RESPONSES TO PHQ QUESTIONS 1-9: 0
8. MOVING OR SPEAKING SO SLOWLY THAT OTHER PEOPLE COULD HAVE NOTICED. OR THE OPPOSITE, BEING SO FIGETY OR RESTLESS THAT YOU HAVE BEEN MOVING AROUND A LOT MORE THAN USUAL: 0
9. THOUGHTS THAT YOU WOULD BE BETTER OFF DEAD, OR OF HURTING YOURSELF: 0
2. FEELING DOWN, DEPRESSED OR HOPELESS: 0
10. IF YOU CHECKED OFF ANY PROBLEMS, HOW DIFFICULT HAVE THESE PROBLEMS MADE IT FOR YOU TO DO YOUR WORK, TAKE CARE OF THINGS AT HOME, OR GET ALONG WITH OTHER PEOPLE: 0
4. FEELING TIRED OR HAVING LITTLE ENERGY: 0
3. TROUBLE FALLING OR STAYING ASLEEP: 0
SUM OF ALL RESPONSES TO PHQ QUESTIONS 1-9: 0
6. FEELING BAD ABOUT YOURSELF - OR THAT YOU ARE A FAILURE OR HAVE LET YOURSELF OR YOUR FAMILY DOWN: 0
7. TROUBLE CONCENTRATING ON THINGS, SUCH AS READING THE NEWSPAPER OR WATCHING TELEVISION: 0
1. LITTLE INTEREST OR PLEASURE IN DOING THINGS: 0
SUM OF ALL RESPONSES TO PHQ QUESTIONS 1-9: 0
5. POOR APPETITE OR OVEREATING: 0
SUM OF ALL RESPONSES TO PHQ QUESTIONS 1-9: 0
SUM OF ALL RESPONSES TO PHQ9 QUESTIONS 1 & 2: 0

## 2024-01-04 NOTE — PROGRESS NOTES
nightly 135 tablet 1    aspirin 81 MG tablet Take 1 tablet by mouth daily 90 tablet 1         Review of Systems: 14 systems were negative except of what was stated on HPI    Nursing note and vitals reviewed.    Vitals:    01/04/24 1247   BP: 126/74   Pulse: 66   SpO2: 95%   Weight: 107 kg (236 lb)     Wt Readings from Last 3 Encounters:   01/04/24 107 kg (236 lb)   11/27/23 107 kg (236 lb)   06/08/23 102.1 kg (225 lb)     BP Readings from Last 3 Encounters:   01/04/24 126/74   11/27/23 112/66   06/08/23 132/76     Body mass index is 46.09 kg/m².  Constitutional: Patient appears well-developed and well-nourished. No distress.   Head: Normocephalic and atraumatic.   Neck: Normal range of motion. Neck supple. No thyroidmegaly.   Cardiovascular: Normal rate, regular rhythm, normal heart sounds and intact distal pulses.   Pulmonary/Chest: Effort normal and breath sounds normal. No stridor. No respiratory distress. No wheezes and no rales.   Abdominal: Soft. Bowel sounds are normal. No distension and no mass. No tenderness. No rebound and no guarding despite her saying there's some discomfort in LLQ abdomen.  Musculoskeletal: No edema and no tenderness.   Skin: No rash or erythema.

## 2024-01-05 LAB
ALBUMIN SERPL-MCNC: 4.8 G/DL (ref 3.4–5)
ALBUMIN/GLOB SERPL: 1.7 {RATIO} (ref 1.1–2.2)
ALP SERPL-CCNC: 95 U/L (ref 40–129)
ALT SERPL-CCNC: 19 U/L (ref 10–40)
ANION GAP SERPL CALCULATED.3IONS-SCNC: 9 MMOL/L (ref 3–16)
AST SERPL-CCNC: 25 U/L (ref 15–37)
BASOPHILS # BLD: 0 K/UL (ref 0–0.2)
BASOPHILS NFR BLD: 0.6 %
BILIRUB SERPL-MCNC: 0.4 MG/DL (ref 0–1)
BUN SERPL-MCNC: 19 MG/DL (ref 7–20)
CALCIUM SERPL-MCNC: 11 MG/DL (ref 8.3–10.6)
CHLORIDE SERPL-SCNC: 98 MMOL/L (ref 99–110)
CO2 SERPL-SCNC: 30 MMOL/L (ref 21–32)
CREAT SERPL-MCNC: 1 MG/DL (ref 0.6–1.2)
DEPRECATED RDW RBC AUTO: 12.9 % (ref 12.4–15.4)
EOSINOPHIL # BLD: 0 K/UL (ref 0–0.6)
EOSINOPHIL NFR BLD: 0.7 %
GFR SERPLBLD CREATININE-BSD FMLA CKD-EPI: 57 ML/MIN/{1.73_M2}
GLUCOSE SERPL-MCNC: 150 MG/DL (ref 70–99)
HCT VFR BLD AUTO: 45.1 % (ref 36–48)
HGB BLD-MCNC: 15.3 G/DL (ref 12–16)
LYMPHOCYTES # BLD: 1.3 K/UL (ref 1–5.1)
LYMPHOCYTES NFR BLD: 19.5 %
MCH RBC QN AUTO: 31.6 PG (ref 26–34)
MCHC RBC AUTO-ENTMCNC: 34 G/DL (ref 31–36)
MCV RBC AUTO: 92.9 FL (ref 80–100)
MONOCYTES # BLD: 0.5 K/UL (ref 0–1.3)
MONOCYTES NFR BLD: 7.5 %
NEUTROPHILS # BLD: 4.8 K/UL (ref 1.7–7.7)
NEUTROPHILS NFR BLD: 71.7 %
PLATELET # BLD AUTO: 204 K/UL (ref 135–450)
PMV BLD AUTO: 9.4 FL (ref 5–10.5)
POTASSIUM SERPL-SCNC: 3.9 MMOL/L (ref 3.5–5.1)
PROT SERPL-MCNC: 7.6 G/DL (ref 6.4–8.2)
RBC # BLD AUTO: 4.85 M/UL (ref 4–5.2)
SODIUM SERPL-SCNC: 137 MMOL/L (ref 136–145)
WBC # BLD AUTO: 6.6 K/UL (ref 4–11)

## 2024-01-08 ENCOUNTER — TELEPHONE (OUTPATIENT)
Dept: INTERNAL MEDICINE CLINIC | Age: 79
End: 2024-01-08

## 2024-01-08 NOTE — TELEPHONE ENCOUNTER
Patient's  called back regarding test results. He states that patient was told to stop taking anything that has calcium in it. She only takes a multivitamin that has 300 mg of calcium in it. They want to know if it is ok for her to continue taking this so she can get the benefit of th other vitamins in it?    Please advise

## 2024-02-14 ENCOUNTER — TELEPHONE (OUTPATIENT)
Dept: INTERNAL MEDICINE CLINIC | Age: 79
End: 2024-02-14

## 2024-02-14 ENCOUNTER — OFFICE VISIT (OUTPATIENT)
Dept: INTERNAL MEDICINE CLINIC | Age: 79
End: 2024-02-14
Payer: MEDICARE

## 2024-02-14 VITALS
WEIGHT: 213.6 LBS | DIASTOLIC BLOOD PRESSURE: 80 MMHG | SYSTOLIC BLOOD PRESSURE: 150 MMHG | OXYGEN SATURATION: 95 % | BODY MASS INDEX: 41.94 KG/M2 | HEIGHT: 60 IN | HEART RATE: 71 BPM

## 2024-02-14 DIAGNOSIS — F41.1 GENERALIZED ANXIETY DISORDER: ICD-10-CM

## 2024-02-14 DIAGNOSIS — F32.2 SEVERE MAJOR DEPRESSION (HCC): Primary | ICD-10-CM

## 2024-02-14 DIAGNOSIS — R94.31 ABNORMAL EKG: ICD-10-CM

## 2024-02-14 DIAGNOSIS — I10 ESSENTIAL HYPERTENSION: ICD-10-CM

## 2024-02-14 PROCEDURE — 36415 COLL VENOUS BLD VENIPUNCTURE: CPT | Performed by: INTERNAL MEDICINE

## 2024-02-14 PROCEDURE — G8399 PT W/DXA RESULTS DOCUMENT: HCPCS | Performed by: INTERNAL MEDICINE

## 2024-02-14 PROCEDURE — 3079F DIAST BP 80-89 MM HG: CPT | Performed by: INTERNAL MEDICINE

## 2024-02-14 PROCEDURE — 1123F ACP DISCUSS/DSCN MKR DOCD: CPT | Performed by: INTERNAL MEDICINE

## 2024-02-14 PROCEDURE — G8484 FLU IMMUNIZE NO ADMIN: HCPCS | Performed by: INTERNAL MEDICINE

## 2024-02-14 PROCEDURE — 93000 ELECTROCARDIOGRAM COMPLETE: CPT | Performed by: INTERNAL MEDICINE

## 2024-02-14 PROCEDURE — 3077F SYST BP >= 140 MM HG: CPT | Performed by: INTERNAL MEDICINE

## 2024-02-14 PROCEDURE — G8427 DOCREV CUR MEDS BY ELIG CLIN: HCPCS | Performed by: INTERNAL MEDICINE

## 2024-02-14 PROCEDURE — G8417 CALC BMI ABV UP PARAM F/U: HCPCS | Performed by: INTERNAL MEDICINE

## 2024-02-14 PROCEDURE — 99213 OFFICE O/P EST LOW 20 MIN: CPT | Performed by: INTERNAL MEDICINE

## 2024-02-14 PROCEDURE — 1090F PRES/ABSN URINE INCON ASSESS: CPT | Performed by: INTERNAL MEDICINE

## 2024-02-14 PROCEDURE — 1036F TOBACCO NON-USER: CPT | Performed by: INTERNAL MEDICINE

## 2024-02-14 NOTE — TELEPHONE ENCOUNTER
Lab results,needs to be faxed to   [phone] Attn: Dr.Nelson Reyes @ [facility]  EKG and Pre-op Exam faxed 2-14-24

## 2024-02-14 NOTE — PROGRESS NOTES
Exercise per Session: 0 min   Stress: Not on file   Social Connections: Not on file   Intimate Partner Violence: Not on file   Housing Stability: Unknown (6/8/2023)    Housing Stability Vital Sign     Unable to Pay for Housing in the Last Year: Not on file     Number of Places Lived in the Last Year: Not on file     Unstable Housing in the Last Year: No       Review of Systems  A comprehensive review of systems was negative except for what was noted in the HPI.    Physical Exam   Vitals:    02/14/24 1400   BP: (!) 162/88   Pulse: 71   SpO2: 95%   Weight: 96.9 kg (213 lb 9.6 oz)   Height: 1.524 m (5')     Wt Readings from Last 2 Encounters:   02/14/24 96.9 kg (213 lb 9.6 oz)   01/04/24 107 kg (236 lb)     BP Readings from Last 3 Encounters:   02/14/24 (!) 162/88   01/04/24 126/74   11/27/23 112/66     Body mass index is 41.72 kg/m².    Constitutional: She is oriented to person, place, and time. She appears well-developed and well-nourished. No distress.   HENT:   Head: Normocephalic and atraumatic.   Mouth/Throat: Uvula is midline, oropharynx is clear and moist and mucous membranes are normal.   Eyes: Conjunctivae and EOM are normal. Pupils are equal, round, and reactive to light.   Neck: Trachea normal and normal range of motion. Neck supple. No JVD present. Carotid bruit is not present. No mass and no thyromegaly present.   Cardiovascular: Normal rate, regular rhythm, normal heart sounds and intact distal pulses.  Exam reveals no gallop and no friction rub.  No murmur heard.  Pulmonary/Chest: Effort normal and breath sounds normal. No respiratory distress. She has no wheezes. She has no rales.   Abdominal: Soft. Normal aorta and bowel sounds are normal. She exhibits no distension and no mass. There is no hepatosplenomegaly. No tenderness.   Musculoskeletal: She exhibits no edema and no tenderness.   Neurological: She is alert and oriented to person, place, and time. She has normal strength. No cranial nerve deficit

## 2024-02-15 LAB
ALBUMIN SERPL-MCNC: 4.6 G/DL (ref 3.4–5)
ALBUMIN/GLOB SERPL: 1.6 {RATIO} (ref 1.1–2.2)
ALP SERPL-CCNC: 69 U/L (ref 40–129)
ALT SERPL-CCNC: 14 U/L (ref 10–40)
ANION GAP SERPL CALCULATED.3IONS-SCNC: 14 MMOL/L (ref 3–16)
AST SERPL-CCNC: 21 U/L (ref 15–37)
BILIRUB SERPL-MCNC: 0.6 MG/DL (ref 0–1)
BUN SERPL-MCNC: 25 MG/DL (ref 7–20)
CALCIUM SERPL-MCNC: 11 MG/DL (ref 8.3–10.6)
CHLORIDE SERPL-SCNC: 99 MMOL/L (ref 99–110)
CO2 SERPL-SCNC: 28 MMOL/L (ref 21–32)
CREAT SERPL-MCNC: 1.1 MG/DL (ref 0.6–1.2)
DEPRECATED RDW RBC AUTO: 13.8 % (ref 12.4–15.4)
GFR SERPLBLD CREATININE-BSD FMLA CKD-EPI: 51 ML/MIN/{1.73_M2}
GLUCOSE SERPL-MCNC: 153 MG/DL (ref 70–99)
HCT VFR BLD AUTO: 44.4 % (ref 36–48)
HGB BLD-MCNC: 14.9 G/DL (ref 12–16)
MCH RBC QN AUTO: 31 PG (ref 26–34)
MCHC RBC AUTO-ENTMCNC: 33.5 G/DL (ref 31–36)
MCV RBC AUTO: 92.4 FL (ref 80–100)
PLATELET # BLD AUTO: 194 K/UL (ref 135–450)
PMV BLD AUTO: 10.2 FL (ref 5–10.5)
POTASSIUM SERPL-SCNC: 3.6 MMOL/L (ref 3.5–5.1)
PROT SERPL-MCNC: 7.4 G/DL (ref 6.4–8.2)
RBC # BLD AUTO: 4.81 M/UL (ref 4–5.2)
SODIUM SERPL-SCNC: 141 MMOL/L (ref 136–145)
TSH SERPL DL<=0.005 MIU/L-ACNC: 0.77 UIU/ML (ref 0.27–4.2)
WBC # BLD AUTO: 6.2 K/UL (ref 4–11)

## 2024-05-22 ENCOUNTER — OFFICE VISIT (OUTPATIENT)
Dept: INTERNAL MEDICINE CLINIC | Age: 79
End: 2024-05-22

## 2024-05-22 VITALS
SYSTOLIC BLOOD PRESSURE: 158 MMHG | OXYGEN SATURATION: 94 % | HEIGHT: 60 IN | BODY MASS INDEX: 42.41 KG/M2 | WEIGHT: 216 LBS | HEART RATE: 65 BPM | DIASTOLIC BLOOD PRESSURE: 70 MMHG

## 2024-05-22 DIAGNOSIS — E66.01 MORBIDLY OBESE (HCC): ICD-10-CM

## 2024-05-22 DIAGNOSIS — E83.52 HYPERCALCEMIA: ICD-10-CM

## 2024-05-22 DIAGNOSIS — I10 ESSENTIAL HYPERTENSION: ICD-10-CM

## 2024-05-22 DIAGNOSIS — E78.49 OTHER HYPERLIPIDEMIA: ICD-10-CM

## 2024-05-22 DIAGNOSIS — R73.9 HYPERGLYCEMIA: Primary | ICD-10-CM

## 2024-05-22 PROBLEM — E11.9 TYPE 2 DIABETES MELLITUS WITHOUT COMPLICATION, WITHOUT LONG-TERM CURRENT USE OF INSULIN (HCC): Status: RESOLVED | Noted: 2021-03-12 | Resolved: 2024-05-22

## 2024-05-22 LAB — HBA1C MFR BLD: 6.8 %

## 2024-05-22 RX ORDER — ATORVASTATIN CALCIUM 20 MG/1
20 TABLET, FILM COATED ORAL DAILY
Qty: 90 TABLET | Refills: 1 | Status: SHIPPED | OUTPATIENT
Start: 2024-05-22

## 2024-05-22 NOTE — PROGRESS NOTES
Makayla Freitas  YOB: 1945    Date of Service:  5/22/2024    Chief Complaint:      Chief Complaint   Patient presents with    Hyperlipidemia    Hypertension    Incontinence       Assessment/Plan:  Makayla was seen today for hyperlipidemia, hypertension and incontinence.    Diagnoses and all orders for this visit:    Hyperglycemia  -     POCT glycosylated hemoglobin (Hb A1C)    Other hyperlipidemia  -     Lipid Panel  -     atorvastatin (LIPITOR) 20 MG tablet; Take 1 tablet by mouth daily    Hypercalcemia  -     CALCIUM IONIZED SERUM  -     PTH, Intact    Essential hypertension    Morbidly obese (HCC)    Goals:   -Eat small amounts of food 4-5 times daily  -Avoid high fat and high sugar foods  -Include protein with all meals and snacks  -Avoid carbonation and caffeine  -Avoid calorie containing beverages  -Increase physical activity as tolerated    Stable and continue on current medications.    Return AWV and f/u 12/2.      HPI:  Makayla Freitas is a 79 y.o.    Her sugar has been in the 150-160's recently.    Urine incontinence:  improve on Detrol LA 4 mg qhs from changing Poise pads 2 times a day and get up at least 2-3 times at night.  She did try a medication many years ago and had surgery twice which didn't help.     Hypercalcemia:  she has stop taking her multivitamin that has calcium in it.    Treatment Adherence:   Medication compliance:  compliant most of the time  Diet compliance:  compliant most of the time  Weight trend: stable  Current exercise: walks 5 time(s) per week  Barriers: none     Hyperlipidemia:  No new myalgias or GI upset on atorvastatin (Lipitor) 20 mg qd.     Anxiety/depression:  Stable on remeron 7.5 mg evening, zyprexa 5 mg evening and zoloft 100 mg 1 1/2 daily per Dr. De Jesus.     Hypertension:  Home blood pressure monitoring: Yes - 137/78 on Hyzaar 100/25 qAM, Toprol  mg qAM   She is adherent to a low sodium diet. Patient denies chest pain, shortness of breath,

## 2024-05-23 DIAGNOSIS — E83.52 HYPERCALCEMIA: Primary | ICD-10-CM

## 2024-05-23 LAB
CHOLEST SERPL-MCNC: 152 MG/DL (ref 0–199)
HDLC SERPL-MCNC: 58 MG/DL (ref 40–60)
LDLC SERPL CALC-MCNC: 69 MG/DL
PTH-INTACT SERPL-MCNC: 31.6 PG/ML (ref 14–72)
TRIGL SERPL-MCNC: 123 MG/DL (ref 0–150)
VLDLC SERPL CALC-MCNC: 25 MG/DL

## 2024-05-28 ENCOUNTER — HOSPITAL ENCOUNTER (OUTPATIENT)
Age: 79
Discharge: HOME | End: 2024-05-28
Payer: MEDICARE

## 2024-05-28 DIAGNOSIS — E83.52 HYPERCALCEMIA: ICD-10-CM

## 2024-05-28 PROCEDURE — 82330 ASSAY OF CALCIUM: CPT

## 2024-05-29 LAB
REASON FOR REJECTION: NORMAL
REJECTED TEST: NORMAL

## 2024-06-05 ENCOUNTER — HOSPITAL ENCOUNTER (OUTPATIENT)
Age: 79
Discharge: HOME | End: 2024-06-05
Payer: MEDICARE

## 2024-06-05 PROCEDURE — 82330 ASSAY OF CALCIUM: CPT

## 2024-06-06 LAB
CA-I ADJ PH7.4 SERPL-SCNC: 1.33 MMOL/L (ref 1.09–1.3)
CA-I SERPL ISE-SCNC: 1.28 MMOL/L (ref 1.09–1.3)

## 2024-07-08 ENCOUNTER — HOSPITAL ENCOUNTER (OUTPATIENT)
Dept: WOMENS IMAGING | Age: 79
Discharge: HOME | End: 2024-07-08
Payer: MEDICARE

## 2024-07-08 VITALS — WEIGHT: 200 LBS | BODY MASS INDEX: 39.27 KG/M2 | HEIGHT: 60 IN

## 2024-07-08 DIAGNOSIS — Z12.31 SCREENING MAMMOGRAM FOR BREAST CANCER: ICD-10-CM

## 2024-07-08 PROCEDURE — 77063 BREAST TOMOSYNTHESIS BI: CPT

## 2024-07-10 LAB — DIABETIC RETINOPATHY: NEGATIVE

## 2024-09-22 NOTE — FLOWSHEET NOTE
Senior Purposeful Rounding    Position:Repositions Self    Physical Environment:Room free from clutter, Clear path to bathroom , Adequate lighting and No safety hazards noted    Pain Rating/ Nonverbal Pain Behaviors:denies; none    Pain interventions Attempted: NA    Patient Toileted:Independent Occiput Anterior

## 2024-11-20 DIAGNOSIS — N39.3 SUI (STRESS URINARY INCONTINENCE, FEMALE): ICD-10-CM

## 2024-11-20 DIAGNOSIS — I10 ESSENTIAL HYPERTENSION: ICD-10-CM

## 2024-11-20 RX ORDER — METOPROLOL SUCCINATE 200 MG/1
200 TABLET, EXTENDED RELEASE ORAL DAILY
Qty: 90 TABLET | Refills: 0 | Status: SHIPPED | OUTPATIENT
Start: 2024-11-20

## 2024-11-20 RX ORDER — TOLTERODINE 4 MG/1
4 CAPSULE, EXTENDED RELEASE ORAL DAILY
Qty: 90 CAPSULE | Refills: 0 | Status: SHIPPED | OUTPATIENT
Start: 2024-11-20

## 2024-11-20 RX ORDER — LOSARTAN POTASSIUM AND HYDROCHLOROTHIAZIDE 25; 100 MG/1; MG/1
1 TABLET ORAL DAILY
Qty: 90 TABLET | Refills: 0 | Status: SHIPPED | OUTPATIENT
Start: 2024-11-20

## 2024-12-02 ENCOUNTER — OFFICE VISIT (OUTPATIENT)
Dept: INTERNAL MEDICINE CLINIC | Age: 79
End: 2024-12-02

## 2024-12-02 VITALS
WEIGHT: 218 LBS | DIASTOLIC BLOOD PRESSURE: 72 MMHG | BODY MASS INDEX: 42.8 KG/M2 | HEIGHT: 60 IN | OXYGEN SATURATION: 95 % | SYSTOLIC BLOOD PRESSURE: 132 MMHG | HEART RATE: 64 BPM

## 2024-12-02 DIAGNOSIS — N39.3 SUI (STRESS URINARY INCONTINENCE, FEMALE): ICD-10-CM

## 2024-12-02 DIAGNOSIS — E83.42 HYPOMAGNESEMIA: ICD-10-CM

## 2024-12-02 DIAGNOSIS — Z00.00 MEDICARE ANNUAL WELLNESS VISIT, SUBSEQUENT: Primary | ICD-10-CM

## 2024-12-02 DIAGNOSIS — E78.49 OTHER HYPERLIPIDEMIA: ICD-10-CM

## 2024-12-02 DIAGNOSIS — I10 ESSENTIAL HYPERTENSION: ICD-10-CM

## 2024-12-02 RX ORDER — TOLTERODINE 4 MG/1
4 CAPSULE, EXTENDED RELEASE ORAL DAILY
Qty: 90 CAPSULE | Refills: 3 | Status: SHIPPED | OUTPATIENT
Start: 2025-02-17

## 2024-12-02 RX ORDER — LOSARTAN POTASSIUM AND HYDROCHLOROTHIAZIDE 25; 100 MG/1; MG/1
1 TABLET ORAL DAILY
Qty: 90 TABLET | Refills: 3 | Status: SHIPPED | OUTPATIENT
Start: 2025-02-17

## 2024-12-02 RX ORDER — MAGNESIUM 200 MG
200 TABLET ORAL DAILY
Qty: 90 TABLET | Refills: 3 | Status: SHIPPED | OUTPATIENT
Start: 2024-12-02

## 2024-12-02 RX ORDER — ATORVASTATIN CALCIUM 20 MG/1
20 TABLET, FILM COATED ORAL DAILY
Qty: 90 TABLET | Refills: 1 | Status: SHIPPED | OUTPATIENT
Start: 2024-12-02

## 2024-12-02 RX ORDER — METOPROLOL SUCCINATE 200 MG/1
200 TABLET, EXTENDED RELEASE ORAL DAILY
Qty: 90 TABLET | Refills: 3 | Status: SHIPPED | OUTPATIENT
Start: 2025-02-17

## 2024-12-02 SDOH — ECONOMIC STABILITY: FOOD INSECURITY: WITHIN THE PAST 12 MONTHS, THE FOOD YOU BOUGHT JUST DIDN'T LAST AND YOU DIDN'T HAVE MONEY TO GET MORE.: NEVER TRUE

## 2024-12-02 SDOH — ECONOMIC STABILITY: FOOD INSECURITY: WITHIN THE PAST 12 MONTHS, YOU WORRIED THAT YOUR FOOD WOULD RUN OUT BEFORE YOU GOT MONEY TO BUY MORE.: NEVER TRUE

## 2024-12-02 SDOH — ECONOMIC STABILITY: INCOME INSECURITY: HOW HARD IS IT FOR YOU TO PAY FOR THE VERY BASICS LIKE FOOD, HOUSING, MEDICAL CARE, AND HEATING?: NOT HARD AT ALL

## 2024-12-02 ASSESSMENT — PATIENT HEALTH QUESTIONNAIRE - PHQ9
SUM OF ALL RESPONSES TO PHQ9 QUESTIONS 1 & 2: 0
1. LITTLE INTEREST OR PLEASURE IN DOING THINGS: NOT AT ALL
7. TROUBLE CONCENTRATING ON THINGS, SUCH AS READING THE NEWSPAPER OR WATCHING TELEVISION: NOT AT ALL
2. FEELING DOWN, DEPRESSED OR HOPELESS: NOT AT ALL
9. THOUGHTS THAT YOU WOULD BE BETTER OFF DEAD, OR OF HURTING YOURSELF: NOT AT ALL
6. FEELING BAD ABOUT YOURSELF - OR THAT YOU ARE A FAILURE OR HAVE LET YOURSELF OR YOUR FAMILY DOWN: NOT AT ALL
10. IF YOU CHECKED OFF ANY PROBLEMS, HOW DIFFICULT HAVE THESE PROBLEMS MADE IT FOR YOU TO DO YOUR WORK, TAKE CARE OF THINGS AT HOME, OR GET ALONG WITH OTHER PEOPLE: NOT DIFFICULT AT ALL
SUM OF ALL RESPONSES TO PHQ QUESTIONS 1-9: 0
5. POOR APPETITE OR OVEREATING: NOT AT ALL
8. MOVING OR SPEAKING SO SLOWLY THAT OTHER PEOPLE COULD HAVE NOTICED. OR THE OPPOSITE, BEING SO FIGETY OR RESTLESS THAT YOU HAVE BEEN MOVING AROUND A LOT MORE THAN USUAL: NOT AT ALL
SUM OF ALL RESPONSES TO PHQ QUESTIONS 1-9: 0
SUM OF ALL RESPONSES TO PHQ QUESTIONS 1-9: 0
4. FEELING TIRED OR HAVING LITTLE ENERGY: NOT AT ALL
SUM OF ALL RESPONSES TO PHQ QUESTIONS 1-9: 0
3. TROUBLE FALLING OR STAYING ASLEEP: NOT AT ALL

## 2024-12-02 NOTE — PROGRESS NOTES
Medicare Annual Wellness Visit    Makayla Freitas is here for Medicare AWV, Hyperglycemia, Depression, and Discuss Medications (Vitamins and RSV vaccination, and well as ear impaction )    Assessment & Plan   Medicare annual wellness visit, subsequent  Recommendations for Preventive Services Due: see orders and patient instructions/AVS.  Recommended screening schedule for the next 5-10 years is provided to the patient in written form: see Patient Instructions/AVS.     No follow-ups on file.     Subjective       Patient's complete Health Risk Assessment and screening values have been reviewed and are found in Flowsheets. The following problems were reviewed today and where indicated follow up appointments were made and/or referrals ordered.    Positive Risk Factor Screenings with Interventions:              Inactivity:  On average, how many days per week do you engage in moderate to strenuous exercise (like a brisk walk)?: 0 days (!) Abnormal  On average, how many minutes do you engage in exercise at this level?: 0 min  Interventions:  Patient declined any further interventions or treatment     Abnormal BMI (obese):  Body mass index is 42.58 kg/m². (!) Abnormal  Interventions:  Patient comments: will try to walk more         Hearing Screen:  Do you or your family notice any trouble with your hearing that hasn't been managed with hearing aids?: (!) Yes    Interventions:  Patient declines any further evaluation or treatment       Advanced Directives:  Do you have a Living Will?: (!) No    Intervention:  Full code                 Objective   Vitals:    12/02/24 1248   BP: 132/72   Site: Left Upper Arm   Position: Sitting   Cuff Size: Medium Adult   Pulse: 64   SpO2: 95%   Weight: 98.9 kg (218 lb)   Height: 1.524 m (5')      Body mass index is 42.58 kg/m².                    Allergies   Allergen Reactions    Norco [Hydrocodone-Acetaminophen] Nausea Only    Vanilla     Ace Inhibitors Rash     Coughing and itching

## 2024-12-02 NOTE — PROGRESS NOTES
Makayla Freitas  YOB: 1945    Date of Service:  12/2/2024    Chief Complaint:      Chief Complaint   Patient presents with    Medicare AWV    Hyperglycemia    Depression    Discuss Medications     Vitamins and RSV vaccination, and well as ear impaction        Assessment/Plan:  Makayla was seen today for medicare awv, hyperglycemia, depression and discuss medications.    Diagnoses and all orders for this visit:    Medicare annual wellness visit, subsequent    Other hyperlipidemia  -     atorvastatin (LIPITOR) 20 MG tablet; Take 1 tablet by mouth daily    Essential hypertension  -     losartan-hydroCHLOROthiazide (HYZAAR) 100-25 MG per tablet; Take 1 tablet by mouth daily  -     metoprolol succinate (TOPROL XL) 200 MG extended release tablet; Take 1 tablet by mouth daily    Hypomagnesemia  -     magnesium 200 MG TABS tablet; Take 1 tablet by mouth daily    TANIKA (stress urinary incontinence, female)  -     tolterodine (DETROL LA) 4 MG extended release capsule; Take 1 capsule by mouth daily      Stable and continue on current medications.    Return Fasting Cholesterol, BP, Incontinence, Magnesium 5/20.      HPI:  Makayla Freiats is a 79 y.o.    Her sugar has been in the 150-160's recently.     Urine incontinence:  improve on Detrol LA 4 mg qhs from changing Poise pads 2 times a day and get up at least 2-3 times at night.  She did try a medication many years ago and had surgery twice which didn't help.     Hypercalcemia:  she has stop taking her multivitamin that has calcium in it.     Treatment Adherence:   Medication compliance:  compliant most of the time  Diet compliance:  compliant most of the time  Weight trend: stable  Current exercise: walks 5 time(s) per week  Barriers: none     Hyperlipidemia:  No new myalgias or GI upset on atorvastatin (Lipitor) 20 mg qd.     Anxiety/depression:  Stable on remeron 7.5 mg evening, zyprexa 5 mg evening and zoloft 100 mg 1 1/2 daily per Dr. De Jesus.

## 2025-01-27 ENCOUNTER — TELEPHONE (OUTPATIENT)
Dept: INTERNAL MEDICINE CLINIC | Age: 80
End: 2025-01-27

## 2025-01-27 NOTE — TELEPHONE ENCOUNTER
----- Message from Dennis SANCHEZ sent at 1/27/2025  1:18 PM EST -----  Regarding: [facility] Appointment Request  [facility] Appointment Request    Patient needs appointment for [facility] Appointment Type: Annual Visit.    Patient Requested Dates(s): January 30, 2025  Patient Requested Time: mid afternoon  Provider Name:Michelle Richard MD    Reason for Appointment Request: Established Patient - No appointments available during search  --------------------------------------------------------------------------------------------------------------------------    Relationship to Patient: Spouse/Partner Wild Freitas  Call Back Information: OK to leave message on voicemail  Preferred Call Back Number: Phone [phone] (home)

## 2025-01-27 NOTE — TELEPHONE ENCOUNTER
had to hang up. Scheduled for tomorrow at 10:10  to hold spot. Will confirm in a call back in a few minutes.

## 2025-01-28 ENCOUNTER — OFFICE VISIT (OUTPATIENT)
Dept: INTERNAL MEDICINE CLINIC | Age: 80
End: 2025-01-28

## 2025-01-28 VITALS
BODY MASS INDEX: 40.4 KG/M2 | WEIGHT: 214 LBS | HEIGHT: 61 IN | SYSTOLIC BLOOD PRESSURE: 100 MMHG | DIASTOLIC BLOOD PRESSURE: 64 MMHG | OXYGEN SATURATION: 95 % | HEART RATE: 60 BPM

## 2025-01-28 DIAGNOSIS — E83.42 HYPOMAGNESEMIA: ICD-10-CM

## 2025-01-28 DIAGNOSIS — R94.31 ABNORMAL EKG: ICD-10-CM

## 2025-01-28 DIAGNOSIS — F33.2 SEVERE RECURRENT MAJOR DEPRESSION WITHOUT PSYCHOTIC FEATURES (HCC): Primary | ICD-10-CM

## 2025-01-28 DIAGNOSIS — I10 ESSENTIAL HYPERTENSION: ICD-10-CM

## 2025-01-28 DIAGNOSIS — E78.49 OTHER HYPERLIPIDEMIA: ICD-10-CM

## 2025-01-28 SDOH — ECONOMIC STABILITY: FOOD INSECURITY: WITHIN THE PAST 12 MONTHS, YOU WORRIED THAT YOUR FOOD WOULD RUN OUT BEFORE YOU GOT MONEY TO BUY MORE.: NEVER TRUE

## 2025-01-28 SDOH — ECONOMIC STABILITY: FOOD INSECURITY: WITHIN THE PAST 12 MONTHS, THE FOOD YOU BOUGHT JUST DIDN'T LAST AND YOU DIDN'T HAVE MONEY TO GET MORE.: NEVER TRUE

## 2025-01-28 ASSESSMENT — PATIENT HEALTH QUESTIONNAIRE - PHQ9
10. IF YOU CHECKED OFF ANY PROBLEMS, HOW DIFFICULT HAVE THESE PROBLEMS MADE IT FOR YOU TO DO YOUR WORK, TAKE CARE OF THINGS AT HOME, OR GET ALONG WITH OTHER PEOPLE: NOT DIFFICULT AT ALL
5. POOR APPETITE OR OVEREATING: NOT AT ALL
2. FEELING DOWN, DEPRESSED OR HOPELESS: NOT AT ALL
1. LITTLE INTEREST OR PLEASURE IN DOING THINGS: NOT AT ALL
7. TROUBLE CONCENTRATING ON THINGS, SUCH AS READING THE NEWSPAPER OR WATCHING TELEVISION: NOT AT ALL
SUM OF ALL RESPONSES TO PHQ QUESTIONS 1-9: 0
9. THOUGHTS THAT YOU WOULD BE BETTER OFF DEAD, OR OF HURTING YOURSELF: NOT AT ALL
4. FEELING TIRED OR HAVING LITTLE ENERGY: NOT AT ALL
SUM OF ALL RESPONSES TO PHQ9 QUESTIONS 1 & 2: 0
8. MOVING OR SPEAKING SO SLOWLY THAT OTHER PEOPLE COULD HAVE NOTICED. OR THE OPPOSITE, BEING SO FIGETY OR RESTLESS THAT YOU HAVE BEEN MOVING AROUND A LOT MORE THAN USUAL: NOT AT ALL
6. FEELING BAD ABOUT YOURSELF - OR THAT YOU ARE A FAILURE OR HAVE LET YOURSELF OR YOUR FAMILY DOWN: NOT AT ALL
3. TROUBLE FALLING OR STAYING ASLEEP: NOT AT ALL
SUM OF ALL RESPONSES TO PHQ QUESTIONS 1-9: 0

## 2025-01-28 NOTE — PROGRESS NOTES
Vanilla     Ace Inhibitors Rash     Coughing and itching    Tape [Adhesive Tape] Rash     Outpatient Medications Marked as Taking for the 1/28/25 encounter (Office Visit) with Michelle Richard MD   Medication Sig Dispense Refill    atorvastatin (LIPITOR) 20 MG tablet Take 1 tablet by mouth daily 90 tablet 1    [START ON 2/17/2025] losartan-hydroCHLOROthiazide (HYZAAR) 100-25 MG per tablet Take 1 tablet by mouth daily 90 tablet 3    magnesium 200 MG TABS tablet Take 1 tablet by mouth daily 90 tablet 3    [START ON 2/17/2025] metoprolol succinate (TOPROL XL) 200 MG extended release tablet Take 1 tablet by mouth daily 90 tablet 3    [START ON 2/17/2025] tolterodine (DETROL LA) 4 MG extended release capsule Take 1 capsule by mouth daily 90 capsule 3    LORazepam (ATIVAN) 0.5 MG tablet Take 1 tablet by mouth nightly. at bedtime.      brimonidine (ALPHAGAN) 0.2 % ophthalmic solution Apply 1 drop to eye 2 times daily      mirtazapine (REMERON) 7.5 MG tablet Take 1 tablet by mouth nightly 30 tablet 0    OLANZapine (ZYPREXA) 2.5 MG tablet Take 1 tablet by mouth nightly (Patient taking differently: Take 2 tablets by mouth nightly) 30 tablet 0    latanoprost (XALATAN) 0.005 % ophthalmic solution Place 1 drop into the left eye nightly      dorzolamide-timolol 22.3-6.8 MG/ML SOLN Apply 1 drop to eye 2 times daily USE DROPS ON RIGHT EYE ONLY BID.      sertraline (ZOLOFT) 100 MG tablet Take 1.5 tablets by mouth nightly 135 tablet 1    aspirin 81 MG tablet Take 1 tablet by mouth daily 90 tablet 1       Past Medical History:   Diagnosis Date    Anxiety     Arthritis     Cancer (HCC)     endometria    Depression     Diabetes mellitus (HCC)     pre diabetic but take medications    Diverticulitis of colon 12/2012    possible    Fibromyalgia     Glaucoma     Heart murmur     Hyperlipidemia     not currently taking meds    Hypertension     Major depression, single episode 12/16/2021    Major depressive disorder, recurrent, moderate (HCC)

## 2025-01-29 LAB
ALBUMIN SERPL-MCNC: 4.3 G/DL (ref 3.4–5)
ALBUMIN/GLOB SERPL: 1.6 {RATIO} (ref 1.1–2.2)
ALP SERPL-CCNC: 89 U/L (ref 40–129)
ALT SERPL-CCNC: 18 U/L (ref 10–40)
ANION GAP SERPL CALCULATED.3IONS-SCNC: 9 MMOL/L (ref 3–16)
AST SERPL-CCNC: 23 U/L (ref 15–37)
BASOPHILS # BLD: 0 K/UL (ref 0–0.2)
BASOPHILS NFR BLD: 0.9 %
BILIRUB SERPL-MCNC: 0.8 MG/DL (ref 0–1)
BUN SERPL-MCNC: 19 MG/DL (ref 7–20)
CALCIUM SERPL-MCNC: 10.4 MG/DL (ref 8.3–10.6)
CHLORIDE SERPL-SCNC: 102 MMOL/L (ref 99–110)
CHOLEST SERPL-MCNC: 149 MG/DL (ref 0–199)
CO2 SERPL-SCNC: 30 MMOL/L (ref 21–32)
CREAT SERPL-MCNC: 0.9 MG/DL (ref 0.6–1.2)
DEPRECATED RDW RBC AUTO: 13.5 % (ref 12.4–15.4)
EOSINOPHIL # BLD: 0.1 K/UL (ref 0–0.6)
EOSINOPHIL NFR BLD: 1.5 %
GFR SERPLBLD CREATININE-BSD FMLA CKD-EPI: 64 ML/MIN/{1.73_M2}
GLUCOSE SERPL-MCNC: 152 MG/DL (ref 70–99)
HCT VFR BLD AUTO: 41.8 % (ref 36–48)
HDLC SERPL-MCNC: 53 MG/DL (ref 40–60)
HGB BLD-MCNC: 14.3 G/DL (ref 12–16)
LDLC SERPL CALC-MCNC: 71 MG/DL
LYMPHOCYTES # BLD: 1.4 K/UL (ref 1–5.1)
LYMPHOCYTES NFR BLD: 27.5 %
MAGNESIUM SERPL-MCNC: 1.89 MG/DL (ref 1.8–2.4)
MCH RBC QN AUTO: 31.5 PG (ref 26–34)
MCHC RBC AUTO-ENTMCNC: 34.2 G/DL (ref 31–36)
MCV RBC AUTO: 92.2 FL (ref 80–100)
MONOCYTES # BLD: 0.3 K/UL (ref 0–1.3)
MONOCYTES NFR BLD: 6.7 %
NEUTROPHILS # BLD: 3.3 K/UL (ref 1.7–7.7)
NEUTROPHILS NFR BLD: 63.4 %
PLATELET # BLD AUTO: 201 K/UL (ref 135–450)
PMV BLD AUTO: 9 FL (ref 5–10.5)
POTASSIUM SERPL-SCNC: 4.1 MMOL/L (ref 3.5–5.1)
PROT SERPL-MCNC: 7 G/DL (ref 6.4–8.2)
RBC # BLD AUTO: 4.54 M/UL (ref 4–5.2)
SODIUM SERPL-SCNC: 141 MMOL/L (ref 136–145)
TRIGL SERPL-MCNC: 126 MG/DL (ref 0–150)
TSH SERPL DL<=0.005 MIU/L-ACNC: 2.53 UIU/ML (ref 0.27–4.2)
VLDLC SERPL CALC-MCNC: 25 MG/DL
WBC # BLD AUTO: 5.2 K/UL (ref 4–11)

## 2025-03-14 NOTE — TELEPHONE ENCOUNTER
was informed. Benign Left Breast Stereotactic Biopsy:  Pathology reviewed by radiologist who dictated concordance and agreed with the pathology. Per radiology, benign pathology results were given to the patient who verbalized understanding. Patient was told about needing a 6 month follow up mammogram. Patient did not state any concerns with the wound.

## 2025-05-27 DIAGNOSIS — E78.49 OTHER HYPERLIPIDEMIA: ICD-10-CM

## 2025-05-27 NOTE — TELEPHONE ENCOUNTER
Refill request for Lipitor 20mg medication.     Name of Pharmacy- [facility]- [place]      Last visit - with Hilary- 1/28/25     Pending visit - with Arpit- 6/3/25    Last refill -12/02/24      Additional Comments only has 7 days left. Wanted to know if this can be refilled before appointment with

## 2025-05-28 RX ORDER — ATORVASTATIN CALCIUM 20 MG/1
20 TABLET, FILM COATED ORAL DAILY
Qty: 90 TABLET | Refills: 1 | Status: SHIPPED | OUTPATIENT
Start: 2025-05-28

## 2025-06-03 ENCOUNTER — OFFICE VISIT (OUTPATIENT)
Dept: INTERNAL MEDICINE CLINIC | Age: 80
End: 2025-06-03

## 2025-06-03 VITALS
SYSTOLIC BLOOD PRESSURE: 124 MMHG | DIASTOLIC BLOOD PRESSURE: 82 MMHG | WEIGHT: 219.4 LBS | BODY MASS INDEX: 41.42 KG/M2 | HEART RATE: 64 BPM | HEIGHT: 61 IN | RESPIRATION RATE: 16 BRPM | OXYGEN SATURATION: 94 %

## 2025-06-03 DIAGNOSIS — I10 ESSENTIAL HYPERTENSION: ICD-10-CM

## 2025-06-03 DIAGNOSIS — E11.9 TYPE 2 DIABETES MELLITUS WITHOUT COMPLICATION, WITHOUT LONG-TERM CURRENT USE OF INSULIN (HCC): ICD-10-CM

## 2025-06-03 DIAGNOSIS — E78.49 OTHER HYPERLIPIDEMIA: Primary | ICD-10-CM

## 2025-06-03 DIAGNOSIS — F33.2 SEVERE RECURRENT MAJOR DEPRESSION WITHOUT PSYCHOTIC FEATURES (HCC): ICD-10-CM

## 2025-06-03 DIAGNOSIS — E83.42 HYPOMAGNESEMIA: ICD-10-CM

## 2025-06-03 DIAGNOSIS — N39.3 SUI (STRESS URINARY INCONTINENCE, FEMALE): ICD-10-CM

## 2025-06-03 ASSESSMENT — PATIENT HEALTH QUESTIONNAIRE - PHQ9
8. MOVING OR SPEAKING SO SLOWLY THAT OTHER PEOPLE COULD HAVE NOTICED. OR THE OPPOSITE, BEING SO FIGETY OR RESTLESS THAT YOU HAVE BEEN MOVING AROUND A LOT MORE THAN USUAL: NOT AT ALL
9. THOUGHTS THAT YOU WOULD BE BETTER OFF DEAD, OR OF HURTING YOURSELF: NOT AT ALL
6. FEELING BAD ABOUT YOURSELF - OR THAT YOU ARE A FAILURE OR HAVE LET YOURSELF OR YOUR FAMILY DOWN: NOT AT ALL
SUM OF ALL RESPONSES TO PHQ QUESTIONS 1-9: 0
SUM OF ALL RESPONSES TO PHQ QUESTIONS 1-9: 0
4. FEELING TIRED OR HAVING LITTLE ENERGY: NOT AT ALL
2. FEELING DOWN, DEPRESSED OR HOPELESS: NOT AT ALL
SUM OF ALL RESPONSES TO PHQ QUESTIONS 1-9: 0
3. TROUBLE FALLING OR STAYING ASLEEP: NOT AT ALL
SUM OF ALL RESPONSES TO PHQ QUESTIONS 1-9: 0
7. TROUBLE CONCENTRATING ON THINGS, SUCH AS READING THE NEWSPAPER OR WATCHING TELEVISION: NOT AT ALL
5. POOR APPETITE OR OVEREATING: NOT AT ALL
1. LITTLE INTEREST OR PLEASURE IN DOING THINGS: NOT AT ALL
10. IF YOU CHECKED OFF ANY PROBLEMS, HOW DIFFICULT HAVE THESE PROBLEMS MADE IT FOR YOU TO DO YOUR WORK, TAKE CARE OF THINGS AT HOME, OR GET ALONG WITH OTHER PEOPLE: NOT DIFFICULT AT ALL

## 2025-06-03 NOTE — PROGRESS NOTES
[facility] Office  [address]  [address]  [place], [place] [address]  Tel: [phone]    Makayla Freitas  1945  female    CC:   Chief Complaint   Patient presents with    Establish Care     Pt is here to establish care       HPI:     Patient is an 80-year-old female presents today to establish care with me.  Denies any acute complaints at this time.    Medical history includes hypertension-takes losartan hydrochlorothiazide 100-25 mg daily, metoprolol 200 mg daily.    Hyperlipidemia-takes aspirin as well as atorvastatin 20 mg daily    Severe depression-undergoes ECT every 4 months, takes Zoloft, as well as mirtazapine 15 mg nightly follows with psychiatry    Anxiety-takes Ativan 0.5 mg at night, also on Zoloft    Stress incontinence-takes tolterodine 4 mg daily    Currently lives alone with her .  Has 3 kids, 4 grandkids.  Worked previously as a .  States recently her  has been going through health issues in his gallbladder, currently in inpatient rehab.  She is doing well at home alone, independent in all ADLs.    Past Medical History:   Diagnosis Date    Anxiety     Arthritis     Cancer (HCC)     endometria    Depression     Diabetes mellitus (HCC)     pre diabetic but take medications    Diverticulitis of colon 12/2012    possible    Fibromyalgia     Glaucoma     Heart murmur     Hyperlipidemia     not currently taking meds    Hypertension     Major depression, single episode 12/16/2021    Major depressive disorder, recurrent, moderate (HCC) 3/12/2021    Occult blood in stools     Osteopenia     Prediabetes 2/20/2013    Sebaceous cyst of skin of right breast 8/24/2018    Type 2 diabetes mellitus without complication, without long-term current use of insulin (HCC) 3/12/2021    Urinary incontinence        Past Surgical History:   Procedure Laterality Date    CATARACT REMOVAL WITH IMPLANT Left 02/15/2019    COLONOSCOPY  12/03/02 01/18/13    hemorrhoids,

## (undated) DEVICE — STANDARD HYPODERMIC NEEDLE,POLYPROPYLENE HUB: Brand: MONOJECT

## (undated) DEVICE — CIRCUIT ANES L72IN 3L BACT AND VIR FLTR EL CONN SGL LIMB

## (undated) DEVICE — TRAY CATH 16FR F INCLUDE LUB DRNGE BG STATLOK STBL DEV

## (undated) DEVICE — NEEDLE HYPO 25GA L1.5IN BLU POLYPR HUB S STL REG BVL STR

## (undated) DEVICE — SUTURE VCRL SZ 2-0 L18IN ABSRB VLT L26MM SH 1/2 CIR J775D

## (undated) DEVICE — SUTURE PERMAHAND SZ 2-0 L18IN NONABSORBABLE BLK L26MM SH C012D

## (undated) DEVICE — CATHETER URETH 18FR 5CC BLLN SIL ELASTMR F 2 W

## (undated) DEVICE — DRAPE,UNDERBUTTOCKS,PCH,STERILE: Brand: MEDLINE

## (undated) DEVICE — SURGICAL PROCEDURE PACK IV U-BAR

## (undated) DEVICE — GLOVE ORANGE PI 7 1/2   MSG9075

## (undated) DEVICE — PACK PROC LAP II AURORA

## (undated) DEVICE — RETRACTOR SURG W18.3XL32.5CM PLAS SELF RET W/ 2 CATH CLP

## (undated) DEVICE — HOOK RETRCT L5MM E SHRP SELF RET SYS LONE STAR

## (undated) DEVICE — PACKING VAG XR DTECT PREWASHED ST RADPQ 4INX36IN 8 PLY

## (undated) DEVICE — GOWN SIRUS NONREIN XL W/TWL: Brand: MEDLINE INDUSTRIES, INC.

## (undated) DEVICE — Z DISCONTINUED PER MEDLINE USE 2752023 DRESSING FOAM W7.62XL7.62CM SIL ADH WTRPRF FLM BK SQ SHP

## (undated) DEVICE — CYSTO/BLADDER IRRIGATION SET, REGULATING CLAMP

## (undated) DEVICE — GAUZE,PACKING STRIP,IODOFORM,1"X5YD,STRL: Brand: CURAD

## (undated) DEVICE — STERILE LATEX POWDER-FREE SURGICAL GLOVESWITH NITRILE AND EMOLLIENT COATINGS: Brand: PROTEXIS

## (undated) DEVICE — ELECTRODE PT RET AD L9FT HI MOIST COND ADH HYDRGEL CORDED

## (undated) DEVICE — GOWN,AURORA,NONREINF,RAGLAN,XXL,STERILE: Brand: MEDLINE

## (undated) DEVICE — Device

## (undated) DEVICE — SYRINGE MED 10ML POLYPR GEN PURP FLAT TOP LUERLOCK TIP

## (undated) DEVICE — SUTURE VCRL SZ 3-0 L18IN ABSRB UD L26MM SH 1/2 CIR J864D

## (undated) DEVICE — SKIN AFFIX SURG ADHESIVE 72/CS 0.55ML: Brand: MEDLINE

## (undated) DEVICE — SUTURE MCRYL + SZ 4-0 L18IN ABSRB UD L19MM PS-2 3/8 CIR MCP496G

## (undated) DEVICE — GLOVE ORANGE PI 8 1/2   MSG9085

## (undated) DEVICE — MAJOR SET UP PK

## (undated) DEVICE — SOLUTION IV IRRIG 500ML 0.9% SODIUM CHL 2F7123

## (undated) DEVICE — SYRINGE MED 10ML LUERLOCK TIP W/O SFTY DISP

## (undated) DEVICE — MEDI-VAC NON-CONDUCTIVE SUCTION TUBING: Brand: CARDINAL HEALTH

## (undated) DEVICE — HOOK RETRCT 12MM E 2 FNGR FOR LONE STAR RETRCT SYS

## (undated) DEVICE — Z DUP USE 2522782 SOLUTION IRRIG 1000ML STRL H2O PLAS CONTAINER UROMATIC

## (undated) DEVICE — SUTURE VCRL + SZ 3-0 L18IN ABSRB UD SH 1/2 CIR TAPERCUT NDL VCP864D

## (undated) DEVICE — SUTURE VCRL + SZ 2-0 L27IN ABSRB VLT L26MM CT-2 1/2 CIR VCP333H

## (undated) DEVICE — RETRACTOR SURG 16.6X16.2CM APS PLAS VELC BK W/ CATH CLP

## (undated) DEVICE — SUTURE VCRL + SZ 3-0 L27IN ABSRB UD L26MM SH 1/2 CIR VCP416H